# Patient Record
Sex: FEMALE | Race: BLACK OR AFRICAN AMERICAN | NOT HISPANIC OR LATINO | Employment: OTHER | URBAN - METROPOLITAN AREA
[De-identification: names, ages, dates, MRNs, and addresses within clinical notes are randomized per-mention and may not be internally consistent; named-entity substitution may affect disease eponyms.]

---

## 2017-05-21 ENCOUNTER — HOSPITAL ENCOUNTER (INPATIENT)
Facility: HOSPITAL | Age: 72
LOS: 1 days | Discharge: HOME | DRG: 312 | End: 2017-05-22
Attending: EMERGENCY MEDICINE | Admitting: INTERNAL MEDICINE
Payer: MEDICARE

## 2017-05-21 DIAGNOSIS — R60.0 BILATERAL EDEMA OF LOWER EXTREMITY: ICD-10-CM

## 2017-05-21 DIAGNOSIS — R55 SYNCOPE, UNSPECIFIED SYNCOPE TYPE: Primary | ICD-10-CM

## 2017-05-21 DIAGNOSIS — R55 SYNCOPE: ICD-10-CM

## 2017-05-21 LAB
ALBUMIN SERPL BCP-MCNC: 3.5 G/DL
ALP SERPL-CCNC: 144 U/L
ALT SERPL W/O P-5'-P-CCNC: 18 U/L
ANION GAP SERPL CALC-SCNC: 16 MMOL/L
AST SERPL-CCNC: 30 U/L
BACTERIA #/AREA URNS HPF: ABNORMAL /HPF
BASOPHILS # BLD AUTO: 0.01 K/UL
BASOPHILS NFR BLD: 0.1 %
BILIRUB SERPL-MCNC: 0.6 MG/DL
BILIRUB UR QL STRIP: NEGATIVE
BNP SERPL-MCNC: 14 PG/ML
BUN SERPL-MCNC: 12 MG/DL
CALCIUM SERPL-MCNC: 9.3 MG/DL
CHLORIDE SERPL-SCNC: 104 MMOL/L
CK SERPL-CCNC: 437 U/L
CLARITY UR: CLEAR
CO2 SERPL-SCNC: 20 MMOL/L
COLOR UR: ABNORMAL
CREAT SERPL-MCNC: 1.2 MG/DL
DIFFERENTIAL METHOD: ABNORMAL
EOSINOPHIL # BLD AUTO: 0.1 K/UL
EOSINOPHIL NFR BLD: 0.4 %
ERYTHROCYTE [DISTWIDTH] IN BLOOD BY AUTOMATED COUNT: 13.8 %
EST. GFR  (AFRICAN AMERICAN): 53 ML/MIN/1.73 M^2
EST. GFR  (NON AFRICAN AMERICAN): 46 ML/MIN/1.73 M^2
GLUCOSE SERPL-MCNC: 294 MG/DL
GLUCOSE UR QL STRIP: ABNORMAL
HCT VFR BLD AUTO: 42 %
HGB BLD-MCNC: 14.3 G/DL
HGB UR QL STRIP: ABNORMAL
KETONES UR QL STRIP: NEGATIVE
LEUKOCYTE ESTERASE UR QL STRIP: ABNORMAL
LYMPHOCYTES # BLD AUTO: 5.2 K/UL
LYMPHOCYTES NFR BLD: 43.2 %
MAGNESIUM SERPL-MCNC: 2.1 MG/DL
MCH RBC QN AUTO: 30.6 PG
MCHC RBC AUTO-ENTMCNC: 34 %
MCV RBC AUTO: 90 FL
MICROSCOPIC COMMENT: ABNORMAL
MONOCYTES # BLD AUTO: 0.5 K/UL
MONOCYTES NFR BLD: 4.3 %
NEUTROPHILS # BLD AUTO: 6.1 K/UL
NEUTROPHILS NFR BLD: 52 %
NITRITE UR QL STRIP: NEGATIVE
PH UR STRIP: 6 [PH] (ref 5–8)
PLATELET # BLD AUTO: 298 K/UL
PLATELET BLD QL SMEAR: ABNORMAL
PMV BLD AUTO: 9.6 FL
POCT GLUCOSE: 302 MG/DL (ref 70–110)
POCT GLUCOSE: 385 MG/DL (ref 70–110)
POTASSIUM SERPL-SCNC: 3.7 MMOL/L
PROT SERPL-MCNC: 7.1 G/DL
PROT UR QL STRIP: NEGATIVE
RBC # BLD AUTO: 4.68 M/UL
RBC #/AREA URNS HPF: 1 /HPF (ref 0–4)
SODIUM SERPL-SCNC: 140 MMOL/L
SP GR UR STRIP: 1.01 (ref 1–1.03)
TROPONIN I SERPL DL<=0.01 NG/ML-MCNC: 0.01 NG/ML
TROPONIN I SERPL DL<=0.01 NG/ML-MCNC: <0.006 NG/ML
URN SPEC COLLECT METH UR: ABNORMAL
UROBILINOGEN UR STRIP-ACNC: NEGATIVE EU/DL
WBC # BLD AUTO: 11.93 K/UL
WBC #/AREA URNS HPF: 5 /HPF (ref 0–5)
YEAST URNS QL MICRO: ABNORMAL

## 2017-05-21 PROCEDURE — 85007 BL SMEAR W/DIFF WBC COUNT: CPT

## 2017-05-21 PROCEDURE — 83735 ASSAY OF MAGNESIUM: CPT

## 2017-05-21 PROCEDURE — G0378 HOSPITAL OBSERVATION PER HR: HCPCS

## 2017-05-21 PROCEDURE — 85027 COMPLETE CBC AUTOMATED: CPT

## 2017-05-21 PROCEDURE — 93010 ELECTROCARDIOGRAM REPORT: CPT | Mod: 76,,, | Performed by: INTERNAL MEDICINE

## 2017-05-21 PROCEDURE — 25000003 PHARM REV CODE 250: Performed by: HOSPITALIST

## 2017-05-21 PROCEDURE — 83880 ASSAY OF NATRIURETIC PEPTIDE: CPT

## 2017-05-21 PROCEDURE — 93010 ELECTROCARDIOGRAM REPORT: CPT | Mod: ,,, | Performed by: INTERNAL MEDICINE

## 2017-05-21 PROCEDURE — 83036 HEMOGLOBIN GLYCOSYLATED A1C: CPT

## 2017-05-21 PROCEDURE — 82550 ASSAY OF CK (CPK): CPT

## 2017-05-21 PROCEDURE — 80053 COMPREHEN METABOLIC PANEL: CPT

## 2017-05-21 PROCEDURE — 63600175 PHARM REV CODE 636 W HCPCS: Performed by: HOSPITALIST

## 2017-05-21 PROCEDURE — 94761 N-INVAS EAR/PLS OXIMETRY MLT: CPT

## 2017-05-21 PROCEDURE — 84484 ASSAY OF TROPONIN QUANT: CPT

## 2017-05-21 PROCEDURE — 84484 ASSAY OF TROPONIN QUANT: CPT | Mod: 91

## 2017-05-21 PROCEDURE — 93005 ELECTROCARDIOGRAM TRACING: CPT

## 2017-05-21 PROCEDURE — 36415 COLL VENOUS BLD VENIPUNCTURE: CPT

## 2017-05-21 PROCEDURE — 99285 EMERGENCY DEPT VISIT HI MDM: CPT

## 2017-05-21 PROCEDURE — 87086 URINE CULTURE/COLONY COUNT: CPT

## 2017-05-21 PROCEDURE — 81000 URINALYSIS NONAUTO W/SCOPE: CPT

## 2017-05-21 RX ORDER — IBUPROFEN 200 MG
16 TABLET ORAL
Status: DISCONTINUED | OUTPATIENT
Start: 2017-05-21 | End: 2017-05-22 | Stop reason: HOSPADM

## 2017-05-21 RX ORDER — INSULIN ASPART 100 [IU]/ML
0-5 INJECTION, SOLUTION INTRAVENOUS; SUBCUTANEOUS
Status: DISCONTINUED | OUTPATIENT
Start: 2017-05-21 | End: 2017-05-22 | Stop reason: SDUPTHER

## 2017-05-21 RX ORDER — CEPHALEXIN 500 MG/1
500 CAPSULE ORAL EVERY 6 HOURS
Status: DISCONTINUED | OUTPATIENT
Start: 2017-05-21 | End: 2017-05-22 | Stop reason: HOSPADM

## 2017-05-21 RX ORDER — GLUCAGON 1 MG
1 KIT INJECTION
Status: DISCONTINUED | OUTPATIENT
Start: 2017-05-21 | End: 2017-05-22 | Stop reason: SDUPTHER

## 2017-05-21 RX ORDER — ENOXAPARIN SODIUM 100 MG/ML
40 INJECTION SUBCUTANEOUS EVERY 24 HOURS
Status: DISCONTINUED | OUTPATIENT
Start: 2017-05-21 | End: 2017-05-22 | Stop reason: HOSPADM

## 2017-05-21 RX ORDER — IBUPROFEN 200 MG
24 TABLET ORAL
Status: DISCONTINUED | OUTPATIENT
Start: 2017-05-21 | End: 2017-05-22 | Stop reason: HOSPADM

## 2017-05-21 RX ADMIN — INSULIN ASPART 3 UNITS: 100 INJECTION, SOLUTION INTRAVENOUS; SUBCUTANEOUS at 10:05

## 2017-05-21 RX ADMIN — SODIUM CHLORIDE 1000 ML: 0.9 INJECTION, SOLUTION INTRAVENOUS at 10:05

## 2017-05-21 RX ADMIN — CEPHALEXIN 500 MG: 500 CAPSULE ORAL at 05:05

## 2017-05-21 RX ADMIN — ENOXAPARIN SODIUM 40 MG: 100 INJECTION SUBCUTANEOUS at 05:05

## 2017-05-21 RX ADMIN — INSULIN ASPART 3 UNITS: 100 INJECTION, SOLUTION INTRAVENOUS; SUBCUTANEOUS at 06:05

## 2017-05-21 NOTE — ED NOTES
APPEARANCE: Alert, oriented and in no acute distress.  CARDIAC: Normal rate and rhythm, no murmur heard.   PERIPHERAL VASCULAR: peripheral pulses present. Normal cap refill. 1+ bilateral lower extremity edema. cool to touch.    RESPIRATORY:Normal rate and effort, breath sounds clear bilaterally throughout chest. Respirations are equal and unlabored no obvious signs of distress.  GASTRO: soft, bowel sounds normal, no tenderness, no abdominal distention.  MUSC: Full ROM. No bony tenderness or soft tissue tenderness. No obvious deformity.  SKIN: Skin is cool and dry, slow to return skin turgor, mucous membranes moist.  NEURO: 5/5 strength major flexors/extensors bilaterally. Sensory intact to light touch bilaterally. [place] coma scale: eyes open spontaneously-4, oriented & converses-5, obeys commands-6. No neurological abnormalities.   MENTAL STATUS: awake, alert and aware of environment.  EYE: PERRL, both eyes: pupils brisk and reactive to light. Normal size.  ENT: EARS: no obvious drainage. NOSE: no active bleeding.

## 2017-05-21 NOTE — NURSING
Patient unable to give name of medication she takes for excess fluid. Explained that MD will need to know which medication it is that she takes at home. Patient voices understanding.

## 2017-05-21 NOTE — ED TRIAGE NOTES
Pt presents to ED today via EMS from urgent care clinic. She received IM injection of decadron and had a syncopal episode   Pt now AAO

## 2017-05-21 NOTE — ED PROVIDER NOTES
Encounter Date: 2017       History     Chief Complaint   Patient presents with    Loss of Consciousness     had shot of decadron at acute care clinic in [facility], had a syncopal episode and loss of bowels, arrives awake      Review of patient's allergies indicates:  No Known Allergies  Patient is a 71-year-old female who had a syncopal episode.  Patient says she went to an urgent care clinic for treatment of a rash to the right side of her neck.  She says she was given a shot at the clinic which he believes may have been a steroid.  After leaving the office she immediately began to feel lightheaded.  She will return to the clinic and as she was being helped to the restroom she passed out.  She had no chest pain or shortness of breath prior to the event.  She has no prior history of syncope.  She complains of feeling tremulous at present.      The history is provided by the patient (and family).     Past Medical History:   Diagnosis Date    Diabetes mellitus      Past Surgical History:   Procedure Laterality Date     SECTION      CHOLECYSTECTOMY       No family history on file.  Social History   Substance Use Topics    Smoking status: Former Smoker     Quit date: 6/10/2014    Smokeless tobacco: Not on file    Alcohol use No     Review of Systems   Constitutional: Negative for fever.   Respiratory: Negative for shortness of breath.    Cardiovascular: Negative for chest pain.   Gastrointestinal: Negative for abdominal pain, nausea and vomiting.   Skin: Positive for rash.   Neurological: Positive for syncope.   All other systems reviewed and are negative.      Physical Exam     Initial Vitals   BP Pulse Resp Temp SpO2   17 1236 17 1236 17 1236 17 1238 17 1236   (!) 146/100 102 20 97.9 °F (36.6 °C) 100 %     Physical Exam    Nursing note and vitals reviewed.  Constitutional: No distress.   HENT:   Head: Normocephalic and atraumatic.   Eyes: EOM are normal.   Neck: Normal  range of motion. Neck supple.   Cardiovascular: Normal rate, regular rhythm and normal heart sounds.   Pulmonary/Chest: Breath sounds normal.   Abdominal: Soft. There is no tenderness.   Musculoskeletal: Normal range of motion.   Neurological: She is alert and oriented to person, place, and time.   Skin: Skin is warm and dry.   Psychiatric: Her behavior is normal. Thought content normal.         ED Course   Procedures  Labs Reviewed   CBC W/ AUTO DIFFERENTIAL - Abnormal; Notable for the following:        Result Value    Lymph # 5.2 (*)     All other components within normal limits   COMPREHENSIVE METABOLIC PANEL - Abnormal; Notable for the following:     CO2 20 (*)     Glucose 294 (*)     Alkaline Phosphatase 144 (*)     eGFR if  53 (*)     eGFR if non  46 (*)     All other components within normal limits   CK - Abnormal; Notable for the following:      (*)     All other components within normal limits   TROPONIN I   MAGNESIUM   URINALYSIS     Imaging Results          X-Ray Chest 1 View (Final result)  Result time 05/21/17 13:14:41    Final result by Angelo Mclean MD (05/21/17 13:14:41)                 Impression:       No acute intrathoracic process.          Electronically signed by: ANGELO MCLEAN MD  Date:     05/21/17  Time:    13:14              Narrative:    [id number]  Accession # [id number]      Study:  XR CHEST 1 VIEW    Indication: syncope.    Comparison: Chest radiograph from 09/12/2016.    Findings:     XR CHEST 1 VIEW.    Cardiac silhouette is not enlarged.  Calcification of the aortic arch.  Pulmonary vasculature is within normal limits.    Lungs well-aerated.  No focal consolidation.   No pleural effusion.  Osseous structures demonstrate no significant abnormalities.                              EKG Readings: (Independently Interpreted)   Rhythm: Sinus Tachycardia. Heart Rate: 103. ST Segments: Normal ST Segments. Axis: Right Axis Deviation.                             ED Course     Clinical Impression:   The primary encounter diagnosis was Syncope, unspecified syncope type. A diagnosis of Syncope was also pertinent to this visit.          Jareth Tomlinson MD  05/21/17 2241

## 2017-05-21 NOTE — H&P
[facility] Internal Medicine History and Physical - Resident Note    Admitting Team: U IM B  Attending Physician: Kassi  Resident: Reyna  Interns: Lila    Date of Admit: 2017    Chief Complaint     Loss of Consciousness (had shot of decadron at acute care clinic in [facility], had a syncopal episode and loss of bowels, arrives awake )   for x1 day    Subjective:      History of Present Illness:  Aretha Holt is a 71 y.o. female with DM2 who presented to [facility] on 2017 with LOC x1 day.    The patient was in their usual state of health (lives at home with grandchildren, no limitations) until 1 week ago when she began to have a rash on her legs that was red and painful.  It migrated from one leg to the other and eventually went away. However, a new rash appeared on her neck and she went to the urgent care.  At the urgent care she was given a steroid shot in her left hip (she is unsure which steroid).  She felt a little shaky at the time and as she went to the car she felt lightheaded.  She felt so bad that she turned around and went back to the urgent care.  They were monitoring her at the urgent care, and she began to have the urge to use the bathroom.  They were walking her to the bathroom and at that point she lost consciousness.  She doesn't know how long she was out but she lost bowel continence.  When she came through they called the ambulance and took her here for evaluation.    She states he symptoms before passing out were associated with shortness of breath and chest pain.  She denies prior similar symptoms.  She endorses some chills over the past few days.    Past Medical History:  Past Medical History:   Diagnosis Date    Diabetes mellitus      Past Surgical History:  Past Surgical History:   Procedure Laterality Date     SECTION      CHOLECYSTECTOMY       Allergies:  Review of patient's allergies indicates:  No Known Allergies    Home  "Medications:  Prior to Admission medications    Medication Sig Start Date End Date Taking? Authorizing Provider   acetaminophen (TYLENOL) 500 MG tablet Take 2 tablets (1,000 mg total) by mouth 3 (three) times daily as needed for Pain. 16   Malvin Davenport MD   ergocalciferol (VITAMIN D2) 50,000 unit Cap Take 50,000 Units by mouth every 7 days.    Historical Provider, MD   glimepiride (AMARYL) 1 MG tablet Take 1 mg by mouth before breakfast.    Historical Provider, MD   ibuprofen (ADVIL,MOTRIN) 200 MG tablet Take 2 tablets (400 mg total) by mouth every 6 (six) hours as needed for Pain. 16   Malvin Davenport MD       Family History:  No family history on file.    Social History:  Social History   Substance Use Topics    Smoking status: Former Smoker     Quit date: 6/10/2014    Smokeless tobacco: Not on file    Alcohol use No       Review of Systems:  Pertinent items are noted in HPI. All other systems are reviewed and are negative.    Health Maintaince :   Primary Care Physician: Nikole Sandra  Immunizations:   TDap is up to date.  Influenza is up to date.  Pneumovax is unsure.  Cancer Screening:  PAP: is not up to date.  Mammogram: is not up to date.  Colonoscopy: is not sure?     Objective:   Last 24 Hour Vital Signs:  BP  Min: 84/43  Max: 146/100  Temp  Av.9 °F (36.6 °C)  Min: 97.9 °F (36.6 °C)  Max: 97.9 °F (36.6 °C)  Pulse  Av  Min: 96  Max: 110  Resp  Av  Min: 20  Max: 20  SpO2  Av %  Min: 100 %  Max: 100 %  Height  Av' 3" (160 cm)  Min: 5' 3" (160 cm)  Max: 5' 3" (160 cm)  Weight  Av.8 kg (187 lb)  Min: 84.8 kg (187 lb)  Max: 84.8 kg (187 lb)  Body mass index is 33.13 kg/m².  No intake/output data recorded.    Physical Examination:  Gen: NAD, laying in bed comfortably  Head: Normocephalic, atraumatic  Eyes: EOMI, PERRLA  Mouth: MMM, tongue protrusion midline, no swelling  Neck: no lymphadenopathy, warm erythematous area on the right aspect with some raised " areas.  CV: RRR, no murmurs,rubs, or gallops  Pulm: CTAB, no wheezes or crackles  Abd: nontender, nondistended, normoactive BS  Extremities: 2+ pulses in all extremities, bilateral lower extremity edema with sharp tenderness to palpation bilaterally. No erythema  Skin: nuch rash as described above, no other rashes or lesions  Neuro: moving all extremities well  Psych: AAOx4, calm, cooperative, conversational    Laboratory:  Most Recent Data:  CBC: Lab Results   Component Value Date    WBC 11.93 05/21/2017    HGB 14.3 05/21/2017    HCT 42.0 05/21/2017     05/21/2017    MCV 90 05/21/2017    RDW 13.8 05/21/2017     Lab Results   Component Value Date    GRAN 6.1 05/21/2017    GRAN 52.0 05/21/2017    LYMPH 5.2 (H) 05/21/2017    LYMPH 43.2 05/21/2017    MONO 0.5 05/21/2017    MONO 4.3 05/21/2017    EOS 0.1 05/21/2017    BASO 0.01 05/21/2017    EOSINOPHIL 0.4 05/21/2017    BASOPHIL 0.1 05/21/2017       BMP: Lab Results   Component Value Date     05/21/2017    K 3.7 05/21/2017     05/21/2017    CO2 20 (L) 05/21/2017    BUN 12 05/21/2017    CREATININE 1.2 05/21/2017     (H) 05/21/2017    CALCIUM 9.3 05/21/2017    MG 2.1 05/21/2017     LFTs: Lab Results   Component Value Date    PROT 7.1 05/21/2017    ALBUMIN 3.5 05/21/2017    BILITOT 0.6 05/21/2017    AST 30 05/21/2017    ALKPHOS 144 (H) 05/21/2017    ALT 18 05/21/2017     Coags:   Lab Results   Component Value Date    INR 1.1 09/12/2016     FLP: No results found for: CHOL, HDL, LDLCALC, TRIG, CHOLHDL  DM: Lab Results   Component Value Date    CREATININE 1.2 05/21/2017     Thyroid: Lab Results   Component Value Date    TSH 1.020 03/11/2016     Anemia: No results found for: IRON, TIBC, FERRITIN, NVLNPSTG99, FOLATE  Cardiac: Lab Results   Component Value Date    TROPONINI <0.006 05/21/2017     Urinalysis: Lab Results   Component Value Date    COLORU Yellow 03/11/2016    SPECGRAV 1.010 03/11/2016    NITRITE Negative 03/11/2016    KETONESU Negative  03/11/2016    UROBILINOGEN Negative 03/11/2016    WBCUA 2 03/11/2016       Trended Lab Data:    Recent Labs  Lab 05/21/17  1305   WBC 11.93   HGB 14.3   HCT 42.0      MCV 90   RDW 13.8      K 3.7      CO2 20*   BUN 12   CREATININE 1.2   *   PROT 7.1   ALBUMIN 3.5   BILITOT 0.6   AST 30   ALKPHOS 144*   ALT 18     Trended Cardiac Data:    Recent Labs  Lab 05/21/17  1305   TROPONINI <0.006     Microbiology Data:    Other Laboratory Data:    Other Results:  EKG (my interpretation): sinus tach, borderline R axis deviation, T wave inversions in V1    Radiology:  Imaging Results          X-Ray Chest 1 View (Final result)  Result time 05/21/17 13:14:41    Final result by Angelo Mclean MD (05/21/17 13:14:41)                 Impression:       No acute intrathoracic process.          Electronically signed by: ANGELO MCLEAN MD  Date:     05/21/17  Time:    13:14              Narrative:    [id number]  Accession # [id number]      Study:  XR CHEST 1 VIEW    Indication: syncope.    Comparison: Chest radiograph from 09/12/2016.    Findings:     XR CHEST 1 VIEW.    Cardiac silhouette is not enlarged.  Calcification of the aortic arch.  Pulmonary vasculature is within normal limits.    Lungs well-aerated.  No focal consolidation.   No pleural effusion.  Osseous structures demonstrate no significant abnormalities.                             Assessment:     Aretha Holt is a 71 y.o. female with:  There are no active problems to display for this patient.       Plan:     Syncope  Concerning for vaso-vagal, orthostatic hypotension, anaphylaxis, less likely cardiogenic e.g. arrhythmia, ACS, PE though low suspicion. Given ASA by EMS. EKG w/ NSR, borderline right axis, but no acute ischemic changes. CXR unremarkable.   - Monitor on tele  - Tryptase pending  - Tn 0.006 >> trend  - Trend EKGs -> repeat axis wnl. Suspect EKG placement vs other cause of R-axis deviancy  - Echo in AM     Erythematous rash of R  neck  Right lateral neck erythema. No swelling, induration, fluctuance. Neck supple, no meningeal signs. Small concern for cellulitis  - PO Keflex     LE edema  Chronic, symmetric, mild diffuse tenderness.   - Low suspicion for DVT/PE but in setting of syncope will get LE venous doppler      Exertional angina  Ongoing for weeks to months. Unrelated to today's episode, no pain recently.  - Tn/EKGs as above  - Consider stress vs angio. May be candidate for outpatient workup.     DM  - Check A1c, hold home glimeperide  - SSI     Diet: Cardiac, NPO @ MN  Fluids: n/a  PPX: LMWH  Code status: Full  Dispo: admit to tele    Berhane Sharp MD HO-1  [facility] Internal Medicine TEAM B    [facility] Medicine Hospitalist Pager numbers:   [facility] Hospitalist Medicine Team A (Corin/Ghanshyam): [phone]  [facility] Hospitalist Medicine Team B (Ted/Charles):  [phone]

## 2017-05-21 NOTE — PLAN OF CARE
Problem: Patient Care Overview  Goal: Plan of Care Review  Outcome: Ongoing (interventions implemented as appropriate)  Pt. On room air in no apparent distress. Will cont. To monitor.

## 2017-05-22 VITALS
BODY MASS INDEX: 33.21 KG/M2 | WEIGHT: 187.44 LBS | TEMPERATURE: 98 F | DIASTOLIC BLOOD PRESSURE: 58 MMHG | HEIGHT: 63 IN | OXYGEN SATURATION: 95 % | RESPIRATION RATE: 18 BRPM | HEART RATE: 79 BPM | SYSTOLIC BLOOD PRESSURE: 138 MMHG

## 2017-05-22 LAB
ALBUMIN SERPL BCP-MCNC: 3.2 G/DL
ALP SERPL-CCNC: 136 U/L
ALT SERPL W/O P-5'-P-CCNC: 17 U/L
ANION GAP SERPL CALC-SCNC: 15 MMOL/L
AST SERPL-CCNC: 20 U/L
BASOPHILS # BLD AUTO: 0 K/UL
BASOPHILS NFR BLD: 0 %
BILIRUB SERPL-MCNC: 0.3 MG/DL
BUN SERPL-MCNC: 16 MG/DL
CALCIUM SERPL-MCNC: 8.7 MG/DL
CHLORIDE SERPL-SCNC: 104 MMOL/L
CHOLEST/HDLC SERPL: 3.5 {RATIO}
CO2 SERPL-SCNC: 20 MMOL/L
CREAT SERPL-MCNC: 1.1 MG/DL
DIASTOLIC DYSFUNCTION: NO
DIFFERENTIAL METHOD: ABNORMAL
EOSINOPHIL # BLD AUTO: 0 K/UL
EOSINOPHIL NFR BLD: 0 %
ERYTHROCYTE [DISTWIDTH] IN BLOOD BY AUTOMATED COUNT: 14 %
EST. GFR  (AFRICAN AMERICAN): 58 ML/MIN/1.73 M^2
EST. GFR  (NON AFRICAN AMERICAN): 51 ML/MIN/1.73 M^2
ESTIMATED AVG GLUCOSE: 212 MG/DL
ESTIMATED PA SYSTOLIC PRESSURE: 21.15
GLUCOSE SERPL-MCNC: 367 MG/DL
HBA1C MFR BLD HPLC: 9 %
HCT VFR BLD AUTO: 37.9 %
HDL/CHOLESTEROL RATIO: 28.4 %
HDLC SERPL-MCNC: 162 MG/DL
HDLC SERPL-MCNC: 46 MG/DL
HGB BLD-MCNC: 12.5 G/DL
LDLC SERPL CALC-MCNC: 93.4 MG/DL
LYMPHOCYTES # BLD AUTO: 1.2 K/UL
LYMPHOCYTES NFR BLD: 10.4 %
MCH RBC QN AUTO: 30 PG
MCHC RBC AUTO-ENTMCNC: 33 %
MCV RBC AUTO: 91 FL
MITRAL VALVE REGURGITATION: NORMAL
MONOCYTES # BLD AUTO: 0.4 K/UL
MONOCYTES NFR BLD: 3.6 %
NEUTROPHILS # BLD AUTO: 10.1 K/UL
NEUTROPHILS NFR BLD: 85.5 %
NONHDLC SERPL-MCNC: 116 MG/DL
PLATELET # BLD AUTO: 303 K/UL
PMV BLD AUTO: 9.7 FL
POCT GLUCOSE: 177 MG/DL (ref 70–110)
POCT GLUCOSE: 296 MG/DL (ref 70–110)
POTASSIUM SERPL-SCNC: 4.1 MMOL/L
PROT SERPL-MCNC: 6.3 G/DL
RBC # BLD AUTO: 4.17 M/UL
RETIRED EF AND QEF - SEE NOTES: 65 (ref 55–65)
SODIUM SERPL-SCNC: 139 MMOL/L
TRICUSPID VALVE REGURGITATION: NORMAL
TRIGL SERPL-MCNC: 113 MG/DL
TROPONIN I SERPL DL<=0.01 NG/ML-MCNC: <0.006 NG/ML
WBC # BLD AUTO: 11.79 K/UL

## 2017-05-22 PROCEDURE — 11000001 HC ACUTE MED/SURG PRIVATE ROOM

## 2017-05-22 PROCEDURE — 93351 STRESS TTE COMPLETE: CPT

## 2017-05-22 PROCEDURE — 36415 COLL VENOUS BLD VENIPUNCTURE: CPT

## 2017-05-22 PROCEDURE — 25000003 PHARM REV CODE 250: Performed by: HOSPITALIST

## 2017-05-22 PROCEDURE — 80061 LIPID PANEL: CPT

## 2017-05-22 PROCEDURE — 84484 ASSAY OF TROPONIN QUANT: CPT

## 2017-05-22 PROCEDURE — 80053 COMPREHEN METABOLIC PANEL: CPT

## 2017-05-22 PROCEDURE — 85025 COMPLETE CBC W/AUTO DIFF WBC: CPT

## 2017-05-22 PROCEDURE — 94761 N-INVAS EAR/PLS OXIMETRY MLT: CPT

## 2017-05-22 PROCEDURE — 93320 DOPPLER ECHO COMPLETE: CPT

## 2017-05-22 PROCEDURE — 63600175 PHARM REV CODE 636 W HCPCS: Performed by: HOSPITALIST

## 2017-05-22 RX ORDER — GLIMEPIRIDE 1 MG/1
1 TABLET ORAL
Qty: 30 TABLET | Refills: 3 | OUTPATIENT
Start: 2017-05-22 | End: 2020-02-05

## 2017-05-22 RX ORDER — INSULIN ASPART 100 [IU]/ML
1-10 INJECTION, SOLUTION INTRAVENOUS; SUBCUTANEOUS EVERY 6 HOURS PRN
Status: DISCONTINUED | OUTPATIENT
Start: 2017-05-22 | End: 2017-05-22 | Stop reason: HOSPADM

## 2017-05-22 RX ORDER — GLUCAGON 1 MG
1 KIT INJECTION
Status: DISCONTINUED | OUTPATIENT
Start: 2017-05-22 | End: 2017-05-22 | Stop reason: HOSPADM

## 2017-05-22 RX ORDER — CEPHALEXIN 500 MG/1
500 CAPSULE ORAL EVERY 12 HOURS
Qty: 7 CAPSULE | Refills: 0 | OUTPATIENT
Start: 2017-05-22 | End: 2020-02-05

## 2017-05-22 RX ADMIN — CEPHALEXIN 500 MG: 500 CAPSULE ORAL at 12:05

## 2017-05-22 RX ADMIN — CEPHALEXIN 500 MG: 500 CAPSULE ORAL at 05:05

## 2017-05-22 RX ADMIN — CEPHALEXIN 500 MG: 500 CAPSULE ORAL at 11:05

## 2017-05-22 RX ADMIN — INSULIN ASPART 6 UNITS: 100 INJECTION, SOLUTION INTRAVENOUS; SUBCUTANEOUS at 08:05

## 2017-05-22 RX ADMIN — INSULIN DETEMIR 10 UNITS: 100 INJECTION, SOLUTION SUBCUTANEOUS at 08:05

## 2017-05-22 NOTE — PLAN OF CARE
Patient discharge instructions given and reviewed. Med rec reviewed with Patient. Patient verbalized understanding. Education provided on new medication and diagnosis and follow-up appointment. PIV d/lucie tip intact. Pt tolerated well.  Tele removed.  Awaiting transportation home.

## 2017-05-22 NOTE — PLAN OF CARE
05/22/17 1649   Final Note   Assessment Type Final Discharge Note   Discharge Disposition Home   Discharge planning education complete? Yes   Hospital Follow Up  Appt(s) scheduled? Yes   Discharge plans and expectations educations in teach back method with documentation complete? Yes   Offered [facility] -- Bedside Delivery? n/a   Discharge/Hospital Encounter Summary to (non-[facility]) PCP No   Referral to Outpatient Case Management complete? No   Referral to / orders for Home Health Complete? No   30 day supply of medicines given at discharge, if documented non-compliance / non-adherence? No   Any social issues identified prior to discharge? No   Did you assess the readiness or willingness of the family or caregiver to support self management of care? No   Right Care Referral Info   Post Acute Recommendation No Care     Frances Scherer, RN, CCM, CMSRN  RN Transition Navigator  [phone]

## 2017-05-22 NOTE — PROGRESS NOTES
1220 pt ready for test. Allergies confirmed, pt denies hx of glaucoma.  R Hand PIV in place flushing smoothly.   1220 Isac [address]OUR[address] Dr at bedside for consent and explain procedure. Pt verbalized full understanding and denies questions.   1231 Test started per protocol, see EKG sheet for vs.  1231 Dobutamine at 10mcg/kg/min to increase hr, target HR 127bpm.  1234 DObutamine at 20 mcg, pt tolerating well.   1237 Dobutamine at 30 mcg, leg exercises initiated. Target HR achieved. Tech taking pics at this time, pt tolerating well ,denies CP NV or any other discomfort.  Testing phase completed, dobutamine off, NS up at rapid rate for recovery phase.   1253 Recovery phase coompleted. Pt states feels normal, no distress. NS dc'd, 225 approx. Pt released to cardio.

## 2017-05-22 NOTE — PLAN OF CARE
Pt voices no discharge needs at this time. TN Frances Scherer to follow.     05/22/17 1402   Discharge Assessment   Assessment Type Discharge Planning Assessment   Confirmed/corrected address and phone number on facesheet? Yes   Assessment information obtained from? Patient   Expected Length of Stay (days) 1   Communicated expected length of stay with patient/caregiver yes   Prior to hospitilization cognitive status: Alert/Oriented   Prior to hospitalization functional status: Independent   Current cognitive status: Alert/Oriented   Current Functional Status: Independent   Arrived From home or self-care   Lives With grandchild(luis eduardo)   Able to Return to Prior Arrangements yes   Is patient able to care for self after discharge? Yes   How many people do you have in your home that can help with your care after discharge? 1   Who are your caregiver(s) and their phone number(s)? Bushra Trejo(daughter) [phone]   Patient's perception of discharge disposition home or selfcare   Readmission Within The Last 30 Days no previous admission in last 30 days   Patient currently being followed by outpatient case management? No   Patient currently receives home health services? No   Does the patient currently use HME? Yes   Patient currently receives private duty nursing? No   Patient currently receives any other outside agency services? No   Equipment Currently Used at Home glucometer   Do you have any problems affording any of your prescribed medications? No   Is the patient taking medications as prescribed? yes   Do you have any financial concerns preventing you from receiving the healthcare you need? No   Does the patient have transportation to healthcare appointments? Yes   Transportation Available family or friend will provide;car   On Dialysis? No   Does the patient receive services at the Coumadin Clinic? No   Are there any open cases? No   Discharge Plan A Home with family   Discharge Plan B Home with family;Home Health    Patient/Family In Agreement With Plan yes

## 2017-05-22 NOTE — PHYSICIAN QUERY
PT Name: Aretha Holt  MR #: [id number]     Physician Query Form - Documentation Clarification      CDS/: Zeina Newell               Contact information:[email or web address]    This form is a permanent document in the medical record.     Query Date: May 22, 2017    By submitting this query, we are merely seeking further clarification of documentation. Please utilize your independent clinical judgment when addressing the question(s) below.    The Medical record reflects the following:    Supporting Clinical Findings Location in Medical Record     DM2, uncontrolled        PN 5/22     A1C 9.0, will likely need insulin on discharge     Glucose = 294---->367   PN 5/22      Labs 5/21-5/22                                                                            Doctor, Please specify diagnosis or diagnoses associated with above clinical findings.    Provider Use Only      [X]  DM2 with hyperglycemia    [   ]  DM2 with hypoglycemia    [   ]  Other explanations with details______________________________________                                                                                                                 [  ] Clinically undetermined

## 2017-05-22 NOTE — PROGRESS NOTES
"[facility] IM Resident HO-I Progress Note    Subjective:      Aretha Holt is a 71 y.o. female who is being followed by the [facility] IM service at [facility] for syncopal episode.    This morning, patient denies any episodes of chest pain or shortness of breath overnight. Denies lightheadedness or syncope.     Objective:   Last 24 Hour Vital Signs:  BP  Min: 84/43  Max: 146/72  Temp  Av °F (36.7 °C)  Min: 97.8 °F (36.6 °C)  Max: 98.2 °F (36.8 °C)  Pulse  Av.1  Min: 75  Max: 110  Resp  Av  Min: 20  Max: 20  SpO2  Av.8 %  Min: 97 %  Max: 100 %  Height  Av' 3" (160 cm)  Min: 5' 3" (160 cm)  Max: 5' 3" (160 cm)  Weight  Av.9 kg (187 lb 3.5 oz)  Min: 84.8 kg (187 lb)  Max: 85 kg (187 lb 7 oz)  I/O last 3 completed shifts:  In: 1000 [I.V.:1000]  Out: 1100 [Urine:1100]    Physical Examination:  Vitals:    17 0035   BP: (!) 129/55   Pulse: 81   Resp: 20   Temp: 98.1 °F (36.7 °C)     Gen: NAD, laying in bed comfortably  HENT: NCAT, EOMI, MMM  CV: RRR, no murmurs,rubs, or gallops  Pulm: CTAB, no wheezes or crackles  Abd: nontender, nondistended, normoactive BS  Extremities: 2+ pulses in all extremities, bilateral lower extremity edema with sharp tenderness to palpation bilaterally. No erythema  Skin: nuch rash as described above, no other rashes or lesions  Neuro: moving all extremities well  Psych: AAOx4, calm, cooperative, conversational      Laboratory:  Laboratory Data Reviewed: yes  Pertinent Findings:  0.006 -> 0.008 -> 0.006    Microbiology Data Reviewed: yes  Pertinent Findings:  Urine cx pending    Other Results:  EKG (my interpretation): R axis deviation resolved, NSR, nonspecific T wave inversions in V1    Radiology Data Reviewed: yes  Pertinent Findings:  None new.    Current Medications:     Infusions:        Scheduled:   cephALEXin  500 mg Oral Q6H    enoxaparin  40 mg Subcutaneous Daily        PRN:  dextrose 50%, dextrose 50%, glucagon (human recombinant), " glucose, glucose, insulin aspart    Antibiotics and Day Number of Therapy:  Cephalexin 5/21-current    Lines and Day Number of Therapy:  PIV    Assessment:     Aretha Holt is a 71 y.o.female with  Patient Active Problem List    Diagnosis Date Noted    Syncope 05/21/2017    Bilateral edema of lower extremity 05/21/2017        Plan:     Syncope  Concerning for vaso-vagal, orthostatic hypotension, anaphylaxis, less likely cardiogenic e.g. arrhythmia, ACS, PE though low suspicion. Given ASA by EMS. EKG w/ NSR, borderline right axis, but no acute ischemic changes. CXR unremarkable.   - Monitor on tele  - Tryptase pending  - Tn 0.006 > 0.008 > 0.006  - Trend EKGs -> repeat axis wnl. Suspect EKG placement vs other cause of R-axis deviancy  - Pending echo and stress test this AM     Erythematous rash of R neck  Right lateral neck erythema. No swelling, induration, fluctuance. Neck supple, no meningeal signs. Small concern for cellulitis  - PO Keflex     LE edema  Chronic, symmetric, mild diffuse tenderness.   - Low suspicion for DVT/PE but in setting of syncope will get LE venous doppler  - US LE pending    Exertional angina  Ongoing for weeks to months. Unrelated to today's episode, no pain recently.  - Tn/EKGs as above  - Consider stress vs angio. May be candidate for outpatient workup.     DM  - A1C 9.0, will likely need insulin on discharge  - SSI  - glucose elevated likely 2/2 steroid injection     Diet: Cardiac, NPO @ MN  Fluids: n/a  PPX: LMWH  Code status: Full  Dispo: admit to tele    Berhane Sharp MD HO-1  [facility] Internal Medicine B    [facility] Medicine Hospitalist Pager numbers:   [facility] Hospitalist Medicine Team A (Corin/Ghanshyam): [phone]  [facility] Hospitalist Medicine Team B (Ted/Charles):  [phone]

## 2017-05-22 NOTE — PLAN OF CARE
Problem: Fall Risk (Adult)  Goal: Absence of Falls  Patient will demonstrate the desired outcomes by discharge/transition of care.   Outcome: Ongoing (interventions implemented as appropriate)  Bed alarm on and bed in lowest position. Call bell in reach and daughter at bed side.Q 2 hour rounding maintained.

## 2017-05-23 LAB
BACTERIA UR CULT: NORMAL
BACTERIA UR CULT: NORMAL

## 2017-05-24 NOTE — DISCHARGE SUMMARY
[facility] Discharge Summary    Primary Team: [facility] B  Attending Physician: Charles  Resident: Reyna  Intern: Lila    Date of Admit: 5/21/2017  Date of Discharge: 5/22    Discharge to: home or self-care  Condition: stable    Discharge Diagnoses     Patient Active Problem List   Diagnosis    Syncope    Bilateral edema of lower extremity       Consultants and Procedures     Consultants:  None.    Procedures:   US lower extremity showing no DVT.  DSE negative for ischemia and EF wnl.    Brief History of Present Illness      Aretha Holt is a 72 y.o. female who  has a past medical history of Diabetes mellitus.  The patient presented to [facility] on 5/21/2017 with a primary complaint of Loss of Consciousness (had shot of decadron at acute care clinic in [facility], had a syncopal episode and loss of bowels, arrives awake )     Patient presented with syncopal episode after receiving steroid shot and was admitted for workup.  DVT study was negative, DSE was within normal limits, and patient was stable for discharge the next day.    For the full HPI please refer to the History & Physical from this admission.    Hospital Course By Problem with Pertinent Findings     Syncope  Concerning for vaso-vagal, orthostatic hypotension, anaphylaxis, less likely cardiogenic e.g. arrhythmia, ACS, PE though low suspicion. Given ASA by EMS. EKG w/ NSR, borderline right axis, but no acute ischemic changes. CXR unremarkable.   - Monitor on tele  - Tryptase pending  - Tn 0.006 > 0.008 > 0.006  - Trend EKGs -> repeat axis wnl. Suspect EKG placement vs other cause of R-axis deviancy  - DSE negative for ischemia and EF 65%  - episode likely vasovagal     Erythematous rash of R neck  Right lateral neck erythema. No swelling, induration, fluctuance. Neck supple, no meningeal signs. Small concern for cellulitis  - PO Keflex  - discharged with 5 days total keflex  - likely contact dermatitis, arranged for follow-up with   Tyson     LE edema  Chronic, symmetric, mild diffuse tenderness.   - Low suspicion for DVT/PE but in setting of syncope will get LE venous doppler  - US LE negative for DVT     Exertional angina  Ongoing for weeks to months. Unrelated to today's episode, no pain recently.  - Tn/EKGs as above  - DSE wnl as above     DM  - A1C 9.0  - SSI  - glucose elevated likely 2/2 steroid injection  - discussed with patient and increased glimepiride dose  - may need insulin in the future, defer to PCP    Discharge Medications        Medication List      START taking these medications    cephALEXin 500 MG capsule  Commonly known as:  KEFLEX  Take 1 capsule (500 mg total) by mouth every 12 (twelve) hours.        CONTINUE taking these medications    acetaminophen 500 MG tablet  Commonly known as:  TYLENOL  Take 2 tablets (1,000 mg total) by mouth 3 (three) times daily as needed for Pain.     glimepiride 1 MG tablet  Commonly known as:  AMARYL  Take 1 tablet (1 mg total) by mouth before breakfast.     ibuprofen 200 MG tablet  Commonly known as:  ADVIL,MOTRIN  Take 2 tablets (400 mg total) by mouth every 6 (six) hours as needed for Pain.     VITAMIN D2 50,000 unit Cap  Generic drug:  ergocalciferol           Where to Get Your Medications      You can get these medications from any pharmacy    Bring a paper prescription for each of these medications   cephALEXin 500 MG capsule   glimepiride 1 MG tablet         Discharge Information:   Diet:  diabetic    Physical Activity:  As tolerated    Instructions:  1. Take all medications as prescribed  2. Keep all follow-up appointments  3. Return to the hospital or call your primary care physicians if any worsening symptoms such as fever, chills, loss of consciousness, or any other symptoms.    Follow-Up Appointments:  PCP Dr. Sandra in 1-2 weeks.  Dr. Mehta in 1-2 weeks.    Berhane Sharp MD HO-1  [facility] Internal Medicine TEAM B

## 2017-06-02 ENCOUNTER — HOSPITAL ENCOUNTER (EMERGENCY)
Facility: HOSPITAL | Age: 72
Discharge: HOME | End: 2017-06-02
Attending: EMERGENCY MEDICINE
Payer: MEDICARE

## 2017-06-02 VITALS
HEIGHT: 63 IN | HEART RATE: 86 BPM | SYSTOLIC BLOOD PRESSURE: 138 MMHG | RESPIRATION RATE: 18 BRPM | BODY MASS INDEX: 33.13 KG/M2 | DIASTOLIC BLOOD PRESSURE: 66 MMHG | TEMPERATURE: 98 F | WEIGHT: 187 LBS

## 2017-06-02 DIAGNOSIS — L25.9 CONTACT DERMATITIS, UNSPECIFIED CONTACT DERMATITIS TYPE, UNSPECIFIED TRIGGER: Primary | ICD-10-CM

## 2017-06-02 DIAGNOSIS — B37.31 CANDIDA VAGINITIS: ICD-10-CM

## 2017-06-02 PROCEDURE — 99283 EMERGENCY DEPT VISIT LOW MDM: CPT

## 2017-06-02 PROCEDURE — 25000003 PHARM REV CODE 250: Performed by: PHYSICIAN ASSISTANT

## 2017-06-02 RX ORDER — HYDROCORTISONE 25 MG/G
CREAM TOPICAL 2 TIMES DAILY
Qty: 3.5 G | Refills: 0 | Status: SHIPPED | OUTPATIENT
Start: 2017-06-02 | End: 2022-02-04 | Stop reason: ALTCHOICE

## 2017-06-02 RX ORDER — FLUCONAZOLE 200 MG/1
200 TABLET ORAL
Status: COMPLETED | OUTPATIENT
Start: 2017-06-02 | End: 2017-06-02

## 2017-06-02 RX ORDER — HYDROXYZINE HYDROCHLORIDE 25 MG/1
25 TABLET, FILM COATED ORAL EVERY 6 HOURS
Qty: 15 TABLET | Refills: 0 | OUTPATIENT
Start: 2017-06-02 | End: 2020-02-05

## 2017-06-02 RX ADMIN — FLUCONAZOLE 200 MG: 200 TABLET ORAL at 02:06

## 2017-06-02 NOTE — ED PROVIDER NOTES
Encounter Date: 2017       History     Chief Complaint   Patient presents with    Rash     Pt stated that she was recently in the hospital for a rash to her right side of her neck. (She is unsure if it is shingles.) Pt stated that she was given a steroid and the rash is not improving. Reports a burning and itching.      Review of patient's allergies indicates:   Allergen Reactions    Decadron [dexamethasone] Other (See Comments)     syncope     72-year-old female with past medical history of diabetes presents to the ED with continued rash to the right lateral neck for the past approximately 2 weeks.  He reports that she initially thought area was shingles and went to urgent care where she received a Decadron shot.  She shortly had an adverse reaction subsequently resulting in her admission to the hospital for observation with improvement in all symptoms other than the rash.  She does report that she received unknown antibiotic during hospital stay and now states that she has a vaginal yeast infection.  She describes the rash as pruritic and now with a burning sensation after excoriating.  She describes no relief of symptoms with Benadryl.  She denies any new soaps or foods however does report wearing a new goldplated necklace approximately 1 day before rash began.  She denies any fever, chills, headache, neck rigidity, chest pain, shortness of breath, URI symptoms, arthralgias, myalgias or other involved locations.       The history is provided by the patient.     Past Medical History:   Diagnosis Date    Diabetes mellitus      Past Surgical History:   Procedure Laterality Date     SECTION      CHOLECYSTECTOMY       History reviewed. No pertinent family history.  Social History   Substance Use Topics    Smoking status: Former Smoker     Quit date: 6/10/2014    Smokeless tobacco: Not on file    Alcohol use No     Review of Systems   Constitutional: Negative for activity change, appetite change,  chills and fever.   HENT: Negative for facial swelling and sore throat.    Eyes: Negative for pain, redness and visual disturbance.   Respiratory: Negative for shortness of breath.    Cardiovascular: Negative for chest pain.   Gastrointestinal: Negative for nausea and vomiting.   Genitourinary: Negative for dysuria.   Musculoskeletal: Negative for arthralgias, back pain, myalgias, neck pain and neck stiffness.   Skin:        To the right lateral neck   Neurological: Negative for weakness, numbness and headaches.   Hematological: Does not bruise/bleed easily.       Physical Exam     Initial Vitals [06/02/17 1350]   BP Pulse Resp Temp SpO2   138/66 86 18 97.8 °F (36.6 °C) --     Physical Exam    Nursing note and vitals reviewed.  Constitutional: Vital signs are normal. She appears well-developed and well-nourished. She is cooperative.  Non-toxic appearance. She does not appear ill. No distress.   HENT:   Head: Normocephalic and atraumatic.   Mouth/Throat: Oropharynx is clear and moist. No oropharyngeal exudate.   Eyes: Conjunctivae and lids are normal.   Neck: Normal range of motion. Neck supple. No no neck rigidity.       Cervical region represent localized area of small papular rash with minor erythema,  excoriations and healed lesions noted.  No vesicular or pustular rash   Cardiovascular: Normal rate and regular rhythm.   Pulmonary/Chest: Breath sounds normal. No respiratory distress. She has no wheezes. She has no rhonchi.   Abdominal: Soft. Normal appearance and bowel sounds are normal. There is no tenderness. There is no rigidity and no guarding.   Musculoskeletal: Normal range of motion.   Lymphadenopathy:     She has no cervical adenopathy.   Neurological: She is alert and oriented to person, place, and time. GCS eye subscore is 4. GCS verbal subscore is 5. GCS motor subscore is 6.   Skin: Skin is warm, dry and intact. Rash noted. No petechiae and no purpura noted. Rash is papular. Rash is not pustular, not  vesicular and not urticarial. There is erythema.   Psychiatric: She has a normal mood and affect. Her speech is normal and behavior is normal. Thought content normal.         ED Course   Procedures  Labs Reviewed - No data to display          Medical Decision Making:   History:   Old Medical Records: I decided to obtain old medical records.  Old Records Summarized: records from previous admission(s).       <> Summary of Records: Reviewed patient's recent admission after adverse reaction from Decadron injection.  Initial Assessment:   72-year-old female with past medical history of diabetes presents to the ED with continued rash to the right lateral neck for the past approximately 2 weeks.  He reports that she initially thought area was shingles and went to urgent care where she received a Decadron shot.  She shortly had an adverse reaction subsequently resulting in her admission to the hospital for observation with improvement in all symptoms other than the rash.  She does report that she received unknown antibiotic during hospital stay and now states that she has a vaginal yeast infection.  She describes the rash as pruritic and now with a burning sensation after excoriating.  She describes no relief of symptoms with Benadryl.  She denies any new soaps or foods however does report wearing a new goldplated necklace approximately 1 day before rash began.  She denies any fever, chills, headache, neck rigidity, chest pain, shortness of breath, URI symptoms, arthralgias, myalgias or other involved locations.  Well-appearing 72-year-old female in no obvious distress with localized area of right lateral neck with minimal erythema and maculopapular rash with some excoriations noted.  No vesicles, pustules or extending erythema.  Differential Diagnosis:   Contact dermatitis, tinea, shingles  ED Management:  Diflucan in the ED.  We will send home a topical hydrocortisone for this probable contact dermatitis.  Instructed to  take Atarax instead of Benadryl to help with pruritus.  Instructed to keep follow-up with dermatologist should rash continue. Patient was cautioned on when to return to ED. Patient verbalized understanding and agreement with the treatment plan                Attending Attestation:     Physician Attestation Statement for NP/PA:   I discussed this assessment and plan of this patient with the NP/PA, but I did not personally examine the patient. The face to face encounter was performed by the NP/PA.    Other NP/PA Attestation Additions:    History of Present Illness: rash                   ED Course     Clinical Impression:   The primary encounter diagnosis was Contact dermatitis, unspecified contact dermatitis type, unspecified trigger. A diagnosis of Candida vaginitis was also pertinent to this visit.          CLAUDIA Carrillo  06/04/17 1914       Hardik Veloz III, MD  06/06/17 2053

## 2017-06-07 ENCOUNTER — HOSPITAL ENCOUNTER (EMERGENCY)
Facility: HOSPITAL | Age: 72
Discharge: HOME | End: 2017-06-07
Attending: EMERGENCY MEDICINE
Payer: MEDICARE

## 2017-06-07 VITALS
HEIGHT: 64 IN | OXYGEN SATURATION: 100 % | BODY MASS INDEX: 31.76 KG/M2 | WEIGHT: 186 LBS | SYSTOLIC BLOOD PRESSURE: 145 MMHG | HEART RATE: 88 BPM | DIASTOLIC BLOOD PRESSURE: 77 MMHG | TEMPERATURE: 98 F | RESPIRATION RATE: 18 BRPM

## 2017-06-07 DIAGNOSIS — E11.8 TYPE 2 DIABETES MELLITUS WITH COMPLICATION, WITHOUT LONG-TERM CURRENT USE OF INSULIN: ICD-10-CM

## 2017-06-07 DIAGNOSIS — R21 RASH: Primary | ICD-10-CM

## 2017-06-07 LAB
ALBUMIN SERPL BCP-MCNC: 3.9 G/DL
ALP SERPL-CCNC: 131 U/L
ALT SERPL W/O P-5'-P-CCNC: 16 U/L
ANION GAP SERPL CALC-SCNC: 11 MMOL/L
AST SERPL-CCNC: 20 U/L
BASOPHILS # BLD AUTO: 0.02 K/UL
BASOPHILS NFR BLD: 0.2 %
BILIRUB SERPL-MCNC: 0.4 MG/DL
BUN SERPL-MCNC: 14 MG/DL
CALCIUM SERPL-MCNC: 9.7 MG/DL
CHLORIDE SERPL-SCNC: 104 MMOL/L
CO2 SERPL-SCNC: 23 MMOL/L
CREAT SERPL-MCNC: 1.2 MG/DL
DIFFERENTIAL METHOD: NORMAL
EOSINOPHIL # BLD AUTO: 0.5 K/UL
EOSINOPHIL NFR BLD: 4.1 %
ERYTHROCYTE [DISTWIDTH] IN BLOOD BY AUTOMATED COUNT: 14.4 %
EST. GFR  (AFRICAN AMERICAN): 52 ML/MIN/1.73 M^2
EST. GFR  (NON AFRICAN AMERICAN): 45 ML/MIN/1.73 M^2
GLUCOSE SERPL-MCNC: 177 MG/DL
HCT VFR BLD AUTO: 41.1 %
HGB BLD-MCNC: 13.9 G/DL
LYMPHOCYTES # BLD AUTO: 3.5 K/UL
LYMPHOCYTES NFR BLD: 31.1 %
MCH RBC QN AUTO: 30.6 PG
MCHC RBC AUTO-ENTMCNC: 33.8 %
MCV RBC AUTO: 91 FL
MONOCYTES # BLD AUTO: 0.9 K/UL
MONOCYTES NFR BLD: 7.8 %
NEUTROPHILS # BLD AUTO: 6.3 K/UL
NEUTROPHILS NFR BLD: 56.4 %
PLATELET # BLD AUTO: 314 K/UL
PMV BLD AUTO: 9.2 FL
POTASSIUM SERPL-SCNC: 4.1 MMOL/L
PROT SERPL-MCNC: 7.9 G/DL
RBC # BLD AUTO: 4.54 M/UL
SODIUM SERPL-SCNC: 138 MMOL/L
WBC # BLD AUTO: 11.17 K/UL

## 2017-06-07 PROCEDURE — 96374 THER/PROPH/DIAG INJ IV PUSH: CPT

## 2017-06-07 PROCEDURE — 85025 COMPLETE CBC W/AUTO DIFF WBC: CPT

## 2017-06-07 PROCEDURE — 96375 TX/PRO/DX INJ NEW DRUG ADDON: CPT

## 2017-06-07 PROCEDURE — 99284 EMERGENCY DEPT VISIT MOD MDM: CPT | Mod: 25

## 2017-06-07 PROCEDURE — 63600175 PHARM REV CODE 636 W HCPCS: Performed by: EMERGENCY MEDICINE

## 2017-06-07 PROCEDURE — 82962 GLUCOSE BLOOD TEST: CPT

## 2017-06-07 PROCEDURE — 80053 COMPREHEN METABOLIC PANEL: CPT

## 2017-06-07 RX ORDER — ONDANSETRON 2 MG/ML
4 INJECTION INTRAMUSCULAR; INTRAVENOUS
Status: COMPLETED | OUTPATIENT
Start: 2017-06-07 | End: 2017-06-07

## 2017-06-07 RX ORDER — OXYCODONE AND ACETAMINOPHEN 5; 325 MG/1; MG/1
1 TABLET ORAL EVERY 4 HOURS PRN
Qty: 20 TABLET | Refills: 0 | OUTPATIENT
Start: 2017-06-07 | End: 2020-02-05

## 2017-06-07 RX ORDER — MORPHINE SULFATE 2 MG/ML
2 INJECTION, SOLUTION INTRAMUSCULAR; INTRAVENOUS
Status: COMPLETED | OUTPATIENT
Start: 2017-06-07 | End: 2017-06-07

## 2017-06-07 RX ORDER — DIPHENHYDRAMINE HYDROCHLORIDE 50 MG/ML
25 INJECTION INTRAMUSCULAR; INTRAVENOUS
Status: COMPLETED | OUTPATIENT
Start: 2017-06-07 | End: 2017-06-07

## 2017-06-07 RX ADMIN — DIPHENHYDRAMINE HYDROCHLORIDE 25 MG: 50 INJECTION, SOLUTION INTRAMUSCULAR; INTRAVENOUS at 05:06

## 2017-06-07 RX ADMIN — MORPHINE SULFATE 2 MG: 2 INJECTION, SOLUTION INTRAMUSCULAR; INTRAVENOUS at 03:06

## 2017-06-07 RX ADMIN — ONDANSETRON 4 MG: 2 INJECTION INTRAMUSCULAR; INTRAVENOUS at 03:06

## 2017-06-07 NOTE — ED NOTES
APPEARANCE: Alert, oriented and in no acute distress.  CARDIAC: Normal rate and rhythm, no murmur heard.   PERIPHERAL VASCULAR: peripheral pulses present. Normal cap refill. No edema. Warm to touch.    RESPIRATORY:Normal rate and effort, breath sounds clear bilaterally throughout chest. Respirations are equal and unlabored no obvious signs of distress.  GASTRO: soft, bowel sounds normal, no tenderness, no abdominal distention.  MUSC: Full ROM. No bony tenderness or soft tissue tenderness. No obvious deformity.  NEURO: 5/5 strength major flexors/extensors bilaterally. Sensory intact to light touch bilaterally. [place] coma scale: eyes open spontaneously-4, oriented & converses-5, obeys commands-6. No neurological abnormalities.   MENTAL STATUS: awake, alert and aware of environment.  EYE: PERRL, both eyes: pupils brisk and reactive to light. Normal size.  ENT: EARS: no obvious drainage. NOSE: no active bleeding.

## 2017-06-08 LAB — POCT GLUCOSE: 245 MG/DL (ref 70–110)

## 2017-06-08 NOTE — ED PROVIDER NOTES
Encounter Date: 2017       History     Chief Complaint   Patient presents with    Rash     Pt reports a persistant itching rash to her neck and chest. Pt has been admitted and returned back to the ER for a second time with no releief.      Review of patient's allergies indicates:   Allergen Reactions    Decadron [dexamethasone] Other (See Comments)     syncope     72-year-old female who complains of a painful rash to the right side of her neck.  Patient has been seen many times for this, including a visit with her dermatologist yesterday.  She is currently using a steroid cream on the area as well as taking Atarax for itching.  She says she has nothing prescribed for pain.  No fever or chills.  She is also concerned because she has diabetes and blood sugars have been elevated in spite of taking the medication.      The history is provided by the patient.     Past Medical History:   Diagnosis Date    Diabetes mellitus      Past Surgical History:   Procedure Laterality Date     SECTION      CHOLECYSTECTOMY       History reviewed. No pertinent family history.  Social History   Substance Use Topics    Smoking status: Former Smoker     Quit date: 6/10/2014    Smokeless tobacco: Not on file    Alcohol use No     Review of Systems   Constitutional: Negative for chills and fever.   HENT: Negative for sore throat.    Gastrointestinal: Negative for nausea and vomiting.   Skin: Positive for rash.       Physical Exam     Initial Vitals [17 1404]   BP Pulse Resp Temp SpO2   (!) 147/80 90 18 98.2 °F (36.8 °C) 98 %     Physical Exam    Nursing note and vitals reviewed.  HENT:   Head: Atraumatic.   Eyes: EOM are normal.   Neck: Neck supple.   Cardiovascular: Normal rate, regular rhythm and normal heart sounds.   Pulmonary/Chest: Breath sounds normal.   Abdominal: Soft. There is no tenderness.   Musculoskeletal: Normal range of motion.   Neurological: She is alert and oriented to person, place, and time.    Skin:   There is a large oval rash the base of the neck on the right side with dried skin and excoriations.  No pustules or vesicles.   Psychiatric: Her behavior is normal. Thought content normal.         ED Course   Procedures  Labs Reviewed   COMPREHENSIVE METABOLIC PANEL - Abnormal; Notable for the following:        Result Value    Glucose 177 (*)     eGFR if  52 (*)     eGFR if non  45 (*)     All other components within normal limits   CBC W/ AUTO DIFFERENTIAL             Medical Decision Making:   ED Management:  72-year-old female with a persistent rash.  She has a lot of pain associated with this and I will prescribe 5 mg Percocet for this.  Have also supplied her with the contact information for Dr. Fracisco Vines to follow-up with.                   ED Course     Clinical Impression:   The primary encounter diagnosis was Rash. A diagnosis of Type 2 diabetes mellitus with complication, without long-term current use of insulin was also pertinent to this visit.          Jareth Tomlinson MD  06/07/17 1153

## 2018-11-15 NOTE — PLAN OF CARE
Consultation - Urology   Mallory Perez 47 y o  male MRN: [id number]  Unit/Bed#: -01 Encounter: [id number]      Assessment/Plan      Assessment:  Left renal colic secondary to 8 mm left ureteropelvic junction stone with very minimal hydronephrosis  Right lower chest and right upper quadrant abdominal pain currently undergoing rule out cholecystitis  Plan:  Await HIDA scan  Obtain abdominal x-ray  Bladder scan for postvoid residual will be obtained  Options, procedure, benefits and risks were discussed with the patient and we will plan cystoscopy with left retrograde urogram and insertion of left ureteral stent  Timing of this procedure depends upon whether or not he requires cholecystectomy at the same time  The patient understands that the stone will not be treated at this time and he will require a 2nd procedure, most likely outpatient shockwave lithotripsy  History of Present Illness   Attending: Carol Aceves MD  Reason for Consult / Principal Problem:   Left renal stone  HPI: Mallory Perez is a 47y o  year old male who presents with acute onset of right lower chest and right upper quadrant abdominal pain  However, he also relates a several week history of intermittent and intermittently severe left flank pain  He denies any associated nausea, vomiting, fever or chills  CT scan upon admission in the emergency room does show an 8 mm stone at the left ureteral pelvic junction  However, there is minimal dilation of the renal pelvis and minimal dilation of the lower pole calices  The patient has a long history of urinary frequency, nocturia, and decreased urinary flow  He has been on tamsulosin  He has never seen a urologist in the past   He denies any dysuria or hematuria  He states that he did have a left kidney stone discovered incidentally a couple of years ago and was told that it was very small  He has no other history of kidney stones    Urinalysis in the emergency room shows Problem: Patient Care Overview  Goal: Plan of Care Review  No true red alarm noted on telemetry. NSR noted.       positive nitrite, but leukocytes are negative and there are minimal white blood cells and bacteria microscopically  Urine culture is pending  This could be a false-positive nitrite on urinalysis, as the patient has an otherwise benign urinalysis and there are no other symptoms of urinary tract infection  Consults    Review of Systems   Gastrointestinal: Positive for abdominal pain  Negative for abdominal distention  Genitourinary: Positive for difficulty urinating, flank pain and frequency  Negative for dysuria and hematuria         Historical Information   Past Medical History:   Diagnosis Date    Anxiety 5/2/2018    Arthritis     Chest pain     Last Assessed: 10/5/2016    Hypertension     Kidney stone     Obesity     Pleural effusion     Last Assessed: 9/15/2017    Pneumonia     Sciatica      Past Surgical History:   Procedure Laterality Date    CHEST TUBE INSERTION      Last Assessed: 9/15/2017    HERNIA REPAIR  2011    LUMBAR LAMINECTOMY  2009    TONSILLECTOMY      Last Assessed: 9/15/2017     Social History   History   Alcohol Use No     Comment: Former consumption of alcohol     History   Drug Use    Types: Marijuana     History   Smoking Status    Current Every Day Smoker    Packs/day: 0 50    Types: Cigarettes   Smokeless Tobacco    Never Used     Family History: non-contributory    Meds/Allergies   current meds:   Current Facility-Administered Medications   Medication Dose Route Frequency    cloNIDine (CATAPRES) tablet 0 3 mg  0 3 mg Oral BID    diphenhydrAMINE (BENADRYL) injection 50 mg  50 mg Intravenous Q6H PRN    DULoxetine (CYMBALTA) delayed release capsule 60 mg  60 mg Oral Daily    heparin (porcine) subcutaneous injection 5,000 Units  5,000 Units Subcutaneous Q8H [facility]    influenza vaccine, recombinant, quadrivalent (FLUBLOK) IM injection 0 5 mL  0 5 mL Intramuscular Prior to discharge    lactated ringers infusion  150 mL/hr Intravenous Continuous    LORazepam (ATIVAN) 2 mg/mL injection 1 mg  1 mg Intravenous Q6H PRN    magnesium hydroxide (MILK OF MAGNESIA) 400 mg/5 mL oral suspension 30 mL  30 mL Oral Daily PRN    metoprolol tartrate (LOPRESSOR) tablet 50 mg  50 mg Oral BID    nicotine (NICODERM CQ) 21 mg/24 hr TD 24 hr patch 21 mg  21 mg Transdermal Daily    ondansetron (ZOFRAN) injection 4 mg  4 mg Intravenous Once PRN    ondansetron (ZOFRAN) injection 4 mg  4 mg Intravenous Q4H PRN    piperacillin-tazobactam (ZOSYN) 3 375 g in sodium chloride 0 9 % 50 mL IVPB  3 375 g Intravenous Q6H    pneumococcal 23-valent polysaccharide vaccine (PNEUMOVAX-23) injection 0 5 mL  0 5 mL Subcutaneous Prior to discharge    pregabalin (LYRICA) capsule 200 mg  200 mg Oral BID    sodium chloride 0 9 % infusion  125 mL/hr Intravenous Continuous    tamsulosin (FLOMAX) capsule 0 4 mg  0 4 mg Oral Daily     Allergies   Allergen Reactions    Codeine Itching    Suboxone [Buprenorphine Hcl-Naloxone Hcl]      Dug self apart    Toradol [Ketorolac Tromethamine]      Itching and hives       Objective   Vitals: Blood pressure 140/92, pulse 63, temperature 98 6 °F (37 °C), temperature source Tympanic, resp  rate 18, height 6' (1 829 m), weight 133 kg (292 lb 2 oz), SpO2 97 %  I/O last 24 hours: In: 5509 [P O :240; IV Piggyback:1050]  Out: -     Invasive Devices     Peripheral Intravenous Line            Peripheral IV 11/13/18 Left Antecubital less than 1 day                Physical Exam   Abdominal: He exhibits no distension  There is tenderness  Genitourinary: Penis normal     mild tenderness in the left upper quadrant and left flank  Normal testicles and spermatic cords bilaterally  Prostate 1+ enlarged firm smooth nontender without masses    Lab Results:   CBC:   Lab Results   Component Value Date    WBC 9 10 11/14/2018    HGB 13 5 (L) 11/14/2018    HCT 41 2 11/14/2018    MCV 93 11/14/2018     11/14/2018    MCH 30 5 11/14/2018    MCHC 32 9 11/14/2018    RDW 13 8 11/14/2018    MPV 8 6 11/14/2018    NRBC 0 11/14/2018     CMP:   Lab Results   Component Value Date    SODIUM 135 11/14/2018     11/14/2018    CO2 27 11/14/2018    BUN 16 11/14/2018    CREATININE 1 32 (H) 11/14/2018    CALCIUM 8 7 11/14/2018    AST 16 11/14/2018    ALT 19 11/14/2018    ALKPHOS 68 11/14/2018    EGFR 61 11/14/2018     Urinalysis:   Lab Results   Component Value Date    COLORU Melanie (A) 11/13/2018    CLARITYU Cloudy (A) 11/13/2018    SPECGRAV >=1 030 (H) 11/13/2018    PHUR 5 5 11/13/2018    LEUKOCYTESUR Negative 11/13/2018    NITRITE Positive (A) 11/13/2018    GLUCOSEU Negative 11/13/2018    KETONESU Negative 11/13/2018    BILIRUBINUR 1+ (A) 11/13/2018    BLOODU 3+ (A) 11/13/2018     Imaging Studies: I have personally reviewed pertinent reports  EKG, Pathology, and Other Studies: I have personally reviewed pertinent reports  VTE Prophylaxis: Sequential compression device Princess Carmona)     Code Status: No Order  Advance Directive and Living Will:      Power of :    POLST:      Counseling / Coordination of Care  Total floor / unit time spent today 45 minutes  Greater than 50% of total time was spent with the patient and / or family counseling and / or coordination of care  A description of the counseling / coordination of care:   I have discussed the case with Dr Renetta Castillo

## 2018-12-13 ENCOUNTER — HOSPITAL ENCOUNTER (EMERGENCY)
Facility: HOSPITAL | Age: 73
Discharge: HOME | End: 2018-12-14
Attending: FAMILY MEDICINE
Payer: MEDICARE

## 2018-12-13 DIAGNOSIS — R07.89 CHEST DISCOMFORT: ICD-10-CM

## 2018-12-13 DIAGNOSIS — R53.1 WEAKNESS: ICD-10-CM

## 2018-12-13 DIAGNOSIS — R07.9 CHEST PAIN: ICD-10-CM

## 2018-12-13 DIAGNOSIS — F41.9 ANXIETY: Primary | ICD-10-CM

## 2018-12-13 LAB
BASOPHILS # BLD AUTO: 0.02 K/UL
BASOPHILS NFR BLD: 0.2 %
DIFFERENTIAL METHOD: ABNORMAL
EOSINOPHIL # BLD AUTO: 0.2 K/UL
EOSINOPHIL NFR BLD: 1.5 %
ERYTHROCYTE [DISTWIDTH] IN BLOOD BY AUTOMATED COUNT: 14.2 %
HCT VFR BLD AUTO: 36.8 %
HGB BLD-MCNC: 11.8 G/DL
LYMPHOCYTES # BLD AUTO: 3.5 K/UL
LYMPHOCYTES NFR BLD: 33.8 %
MCH RBC QN AUTO: 29.9 PG
MCHC RBC AUTO-ENTMCNC: 32.1 G/DL
MCV RBC AUTO: 93 FL
MONOCYTES # BLD AUTO: 0.9 K/UL
MONOCYTES NFR BLD: 8.4 %
NEUTROPHILS # BLD AUTO: 5.8 K/UL
NEUTROPHILS NFR BLD: 56 %
PLATELET # BLD AUTO: 256 K/UL
PMV BLD AUTO: 8.8 FL
POCT GLUCOSE: 102 MG/DL (ref 70–110)
RBC # BLD AUTO: 3.95 M/UL
WBC # BLD AUTO: 10.31 K/UL

## 2018-12-13 PROCEDURE — 96374 THER/PROPH/DIAG INJ IV PUSH: CPT

## 2018-12-13 PROCEDURE — 80053 COMPREHEN METABOLIC PANEL: CPT

## 2018-12-13 PROCEDURE — 93010 ELECTROCARDIOGRAM REPORT: CPT | Mod: ,,, | Performed by: INTERNAL MEDICINE

## 2018-12-13 PROCEDURE — 63600175 PHARM REV CODE 636 W HCPCS: Performed by: FAMILY MEDICINE

## 2018-12-13 PROCEDURE — 99285 EMERGENCY DEPT VISIT HI MDM: CPT | Mod: 25

## 2018-12-13 PROCEDURE — 82962 GLUCOSE BLOOD TEST: CPT

## 2018-12-13 PROCEDURE — 96375 TX/PRO/DX INJ NEW DRUG ADDON: CPT

## 2018-12-13 PROCEDURE — 85025 COMPLETE CBC W/AUTO DIFF WBC: CPT

## 2018-12-13 PROCEDURE — 93005 ELECTROCARDIOGRAM TRACING: CPT

## 2018-12-13 PROCEDURE — 84484 ASSAY OF TROPONIN QUANT: CPT

## 2018-12-13 PROCEDURE — 85730 THROMBOPLASTIN TIME PARTIAL: CPT

## 2018-12-13 PROCEDURE — 85610 PROTHROMBIN TIME: CPT

## 2018-12-13 RX ORDER — ONDANSETRON 2 MG/ML
4 INJECTION INTRAMUSCULAR; INTRAVENOUS
Status: COMPLETED | OUTPATIENT
Start: 2018-12-13 | End: 2018-12-13

## 2018-12-13 RX ADMIN — ONDANSETRON 4 MG: 2 INJECTION INTRAMUSCULAR; INTRAVENOUS at 11:12

## 2018-12-14 VITALS
BODY MASS INDEX: 33.13 KG/M2 | HEART RATE: 66 BPM | OXYGEN SATURATION: 97 % | DIASTOLIC BLOOD PRESSURE: 70 MMHG | WEIGHT: 180 LBS | RESPIRATION RATE: 16 BRPM | HEIGHT: 62 IN | TEMPERATURE: 98 F | SYSTOLIC BLOOD PRESSURE: 128 MMHG

## 2018-12-14 LAB
ALBUMIN SERPL BCP-MCNC: 3.7 G/DL
ALP SERPL-CCNC: 123 U/L
ALT SERPL W/O P-5'-P-CCNC: 15 U/L
ANION GAP SERPL CALC-SCNC: 4 MMOL/L
APTT BLDCRRT: 35.4 SEC
AST SERPL-CCNC: 18 U/L
BACTERIA #/AREA URNS AUTO: NORMAL /HPF
BILIRUB SERPL-MCNC: 0.2 MG/DL
BILIRUB UR QL STRIP: NEGATIVE
BUN SERPL-MCNC: 13 MG/DL
CALCIUM SERPL-MCNC: 8.7 MG/DL
CHLORIDE SERPL-SCNC: 108 MMOL/L
CLARITY UR REFRACT.AUTO: CLEAR
CO2 SERPL-SCNC: 28 MMOL/L
COLOR UR AUTO: ABNORMAL
CREAT SERPL-MCNC: 0.86 MG/DL
EST. GFR  (AFRICAN AMERICAN): >60 ML/MIN/1.73 M^2
EST. GFR  (NON AFRICAN AMERICAN): >60 ML/MIN/1.73 M^2
GLUCOSE SERPL-MCNC: 104 MG/DL
GLUCOSE UR QL STRIP: NEGATIVE
HGB UR QL STRIP: NEGATIVE
INR PPP: 1.2
KETONES UR QL STRIP: NEGATIVE
LEUKOCYTE ESTERASE UR QL STRIP: ABNORMAL
MICROSCOPIC COMMENT: NORMAL
NITRITE UR QL STRIP: NEGATIVE
PH UR STRIP: 6 [PH] (ref 5–8)
POTASSIUM SERPL-SCNC: 3.7 MMOL/L
PROT SERPL-MCNC: 6.6 G/DL
PROT UR QL STRIP: NEGATIVE
PROTHROMBIN TIME: 13.2 SEC
SODIUM SERPL-SCNC: 140 MMOL/L
SP GR UR STRIP: 1.01 (ref 1–1.03)
TROPONIN I SERPL DL<=0.01 NG/ML-MCNC: <0.012 NG/ML
TROPONIN I SERPL DL<=0.01 NG/ML-MCNC: <0.012 NG/ML
URN SPEC COLLECT METH UR: ABNORMAL
UROBILINOGEN UR STRIP-ACNC: NEGATIVE EU/DL
WBC #/AREA URNS AUTO: 1 /HPF (ref 0–5)

## 2018-12-14 PROCEDURE — C9113 INJ PANTOPRAZOLE SODIUM, VIA: HCPCS | Performed by: FAMILY MEDICINE

## 2018-12-14 PROCEDURE — 63600175 PHARM REV CODE 636 W HCPCS: Performed by: FAMILY MEDICINE

## 2018-12-14 PROCEDURE — 81000 URINALYSIS NONAUTO W/SCOPE: CPT

## 2018-12-14 PROCEDURE — 84484 ASSAY OF TROPONIN QUANT: CPT

## 2018-12-14 PROCEDURE — 25000003 PHARM REV CODE 250: Performed by: FAMILY MEDICINE

## 2018-12-14 RX ORDER — ALPRAZOLAM 0.25 MG/1
0.5 TABLET ORAL
Status: COMPLETED | OUTPATIENT
Start: 2018-12-14 | End: 2018-12-14

## 2018-12-14 RX ORDER — ALPRAZOLAM 1 MG/1
TABLET ORAL
Status: DISCONTINUED
Start: 2018-12-14 | End: 2018-12-14 | Stop reason: HOSPADM

## 2018-12-14 RX ORDER — PANTOPRAZOLE SODIUM 40 MG/10ML
40 INJECTION, POWDER, LYOPHILIZED, FOR SOLUTION INTRAVENOUS
Status: COMPLETED | OUTPATIENT
Start: 2018-12-14 | End: 2018-12-14

## 2018-12-14 RX ORDER — ALPRAZOLAM 0.25 MG/1
0.25 TABLET ORAL NIGHTLY PRN
Qty: 5 TABLET | Refills: 0 | OUTPATIENT
Start: 2018-12-14 | End: 2020-02-05

## 2018-12-14 RX ORDER — ALPRAZOLAM 0.25 MG/1
0.5 TABLET ORAL
Status: DISCONTINUED | OUTPATIENT
Start: 2018-12-14 | End: 2018-12-14

## 2018-12-14 RX ADMIN — ALPRAZOLAM 0.5 MG: 1 TABLET ORAL at 01:12

## 2018-12-14 RX ADMIN — LIDOCAINE HYDROCHLORIDE: 20 SOLUTION ORAL; TOPICAL at 03:12

## 2018-12-14 RX ADMIN — PANTOPRAZOLE SODIUM 40 MG: 40 INJECTION, POWDER, FOR SOLUTION INTRAVENOUS at 03:12

## 2018-12-14 NOTE — ED PROVIDER NOTES
"Encounter Date: 2018       History     Chief Complaint   Patient presents with    Fatigue     c/o generalized weakness today; states she thought it was her blood sugar so she ate peanut butter and peppermints without improvement; also c/o "chest weakness" denies pain at this time; also c/o nausea     Patient complains of generalized body weakness since 7:00 p.m..  Patient claims she has been exhausted all day and was tired.  Mild chest pain started around 7 p.m..  Does not the main problem she is coming.  Mild nausea.  No shortness of breath, no fever, no abdominal pain, no focal weakness.  No slurring of speech.  No blurring of vision.  Mild dizziness.  Patient is known diabetic and thought her blood sugar was low so she took peanut butter and peppermint before coming in.      The history is provided by the patient.     Review of patient's allergies indicates:   Allergen Reactions    Decadron [dexamethasone] Other (See Comments)     syncope     Past Medical History:   Diagnosis Date    Diabetes mellitus      Past Surgical History:   Procedure Laterality Date     SECTION      CHOLECYSTECTOMY       No family history on file.  Social History     Tobacco Use    Smoking status: Former Smoker     Last attempt to quit: 6/10/2014     Years since quittin.5   Substance Use Topics    Alcohol use: No    Drug use: No     Review of Systems   Constitutional: Negative for activity change, appetite change, chills and fever.   HENT: Negative for congestion, ear discharge, rhinorrhea, sinus pressure, sinus pain and sore throat.    Eyes: Negative for pain and redness.   Respiratory: Negative for cough, shortness of breath and wheezing.    Cardiovascular: Positive for chest pain. Negative for palpitations.   Gastrointestinal: Positive for nausea. Negative for abdominal distention, abdominal pain, blood in stool, constipation, diarrhea and vomiting.   Genitourinary: Negative for dysuria, flank pain and " frequency.   Musculoskeletal: Negative for back pain, gait problem, neck pain and neck stiffness.   Skin: Negative for rash.   Neurological: Positive for dizziness and weakness. Negative for light-headedness and headaches.   Psychiatric/Behavioral: Negative for confusion. The patient is not nervous/anxious.        Physical Exam     Initial Vitals [12/13/18 2325]   BP Pulse Resp Temp SpO2   129/63 79 18 98 °F (36.7 °C) 99 %      MAP       --         Physical Exam    Nursing note and vitals reviewed.  Constitutional: She appears well-developed and well-nourished.   HENT:   Head: Normocephalic.   Nose: Nose normal.   Mouth/Throat: Oropharynx is clear and moist.   Eyes: Conjunctivae and EOM are normal. Pupils are equal, round, and reactive to light.   Neck: Normal range of motion. Neck supple.   Cardiovascular: Normal rate, regular rhythm, normal heart sounds and intact distal pulses.   Pulmonary/Chest: Breath sounds normal. No respiratory distress. She has no wheezes. She has no rhonchi. She has no rales. She exhibits no tenderness.   Abdominal: Soft. Bowel sounds are normal. She exhibits no distension. There is no tenderness. There is no guarding.   Musculoskeletal: Normal range of motion. She exhibits no tenderness.   Neurological: She is alert and oriented to person, place, and time. She has normal strength and normal reflexes. No cranial nerve deficit or sensory deficit. GCS score is 15. GCS eye subscore is 4. GCS verbal subscore is 5. GCS motor subscore is 6.   Skin: Skin is warm. Capillary refill takes less than 2 seconds. No rash noted. No erythema.   Psychiatric: She has a normal mood and affect. Her behavior is normal. Judgment and thought content normal.         ED Course   Procedures  Labs Reviewed   CBC W/ AUTO DIFFERENTIAL - Abnormal; Notable for the following components:       Result Value    RBC 3.95 (*)     Hemoglobin 11.8 (*)     Hematocrit 36.8 (*)     MPV 8.8 (*)     All other components within  normal limits   COMPREHENSIVE METABOLIC PANEL - Abnormal; Notable for the following components:    Anion Gap 4 (*)     All other components within normal limits   APTT - Abnormal; Notable for the following components:    aPTT 35.4 (*)     All other components within normal limits   PROTIME-INR - Abnormal; Notable for the following components:    Prothrombin Time 13.2 (*)     All other components within normal limits   TROPONIN I   URINALYSIS, REFLEX TO URINE CULTURE   POCT GLUCOSE   POCT GLUCOSE MONITORING CONTINUOUS     EKG Readings: (Independently Interpreted)   Initial Reading: No STEMI. Rhythm: Normal Sinus Rhythm. Heart Rate: 79. Ectopy: No Ectopy. Conduction: Normal. ST Segments: Normal ST Segments. T Waves: Normal. Axis: Right Axis Deviation. Clinical Impression: Normal Sinus Rhythm       Imaging Results    None          Medical Decision Making:   Initial Assessment:   73-year-old female presents to ER with generalized weakness and chest tightness since few hours.  No shortness of breath. Patient claims she has been working hard and a being stressed a lot.  Denies shortness of breath. No cough or fever.  Differential Diagnosis:   Anxiety, chest wall pain, angina, myocardial infraction, GERD musculoskeletal pain.  Clinical Tests:   Lab Tests: Ordered and Reviewed  Radiological Study: Ordered and Reviewed  Medical Tests: Ordered and Reviewed  ED Management:  Patient had a complete cardiac workup with repeated cardiac enzymes negative along with no acute changes on EKG.  Normal chest x-ray.  Patient's symptoms persisted in the ER even after treating with Xanax.  Patient symptoms improved after GI cocktail and Pepcid.  Most likely noncardiac , GERD.  Patient is given prescription for pain, anxiety advised to follow up with primary care physician or to the ER if symptoms worsen.                   ED Course as of Dec 14 0116   Fri Dec 14, 2018   0110 Mild tightness of the chest.  Cardiac workup is negative. Patient  claims she has a lot of stress at home and works a lot.  Will try Xanax and see how she does.  [SP]      ED Course User Index  [SP] Venkatesh Maldonado MD     Clinical Impression:   The primary encounter diagnosis was Anxiety. Diagnoses of Chest discomfort, Weakness, and Chest pain were also pertinent to this visit.      Disposition:   Disposition: Discharged  Condition: Fair                        Venkatesh Maldonado MD  12/15/18 8531

## 2018-12-14 NOTE — ED NOTES
Pt resting on stretcher, medication administered as ordered, pt repositioned on stretcher for comfort, lights dimmed for pt comfort, nadn, resp e/u. Pt voiced no needs, family at the bedside, bed low and locked, Sr up x2, call light within reach, will ctm

## 2018-12-14 NOTE — ED NOTES
Pt wheeled to restroom for UA specimen but missed the cup. Pt awake and alert, nadn, resp e/u. Pt voiced no needs, bed low and locked, Sr up x2, call light within reach, will ctm.

## 2020-02-05 ENCOUNTER — HOSPITAL ENCOUNTER (EMERGENCY)
Facility: HOSPITAL | Age: 75
Discharge: HOME | End: 2020-02-05
Attending: EMERGENCY MEDICINE
Payer: MEDICARE

## 2020-02-05 VITALS
TEMPERATURE: 98 F | DIASTOLIC BLOOD PRESSURE: 83 MMHG | WEIGHT: 180 LBS | OXYGEN SATURATION: 97 % | RESPIRATION RATE: 18 BRPM | HEIGHT: 63 IN | SYSTOLIC BLOOD PRESSURE: 125 MMHG | HEART RATE: 89 BPM | BODY MASS INDEX: 31.89 KG/M2

## 2020-02-05 DIAGNOSIS — R53.1 GENERALIZED WEAKNESS: Primary | ICD-10-CM

## 2020-02-05 LAB
ALBUMIN SERPL BCP-MCNC: 4.1 G/DL (ref 3.5–5.2)
ALP SERPL-CCNC: 155 U/L (ref 38–126)
ALT SERPL W/O P-5'-P-CCNC: 17 U/L (ref 10–44)
ANION GAP SERPL CALC-SCNC: 6 MMOL/L (ref 8–16)
AST SERPL-CCNC: 23 U/L (ref 15–46)
BACTERIA #/AREA URNS AUTO: NORMAL /HPF
BASOPHILS # BLD AUTO: 0.04 K/UL (ref 0–0.2)
BASOPHILS NFR BLD: 0.4 % (ref 0–1.9)
BILIRUB SERPL-MCNC: 0.2 MG/DL (ref 0.1–1)
BILIRUB UR QL STRIP: NEGATIVE
BUN SERPL-MCNC: 18 MG/DL (ref 7–17)
CALCIUM SERPL-MCNC: 9.2 MG/DL (ref 8.7–10.5)
CHLORIDE SERPL-SCNC: 104 MMOL/L (ref 95–110)
CLARITY UR REFRACT.AUTO: CLEAR
CO2 SERPL-SCNC: 29 MMOL/L (ref 23–29)
COLOR UR AUTO: YELLOW
CREAT SERPL-MCNC: 1.02 MG/DL (ref 0.5–1.4)
DIFFERENTIAL METHOD: ABNORMAL
EOSINOPHIL # BLD AUTO: 0.2 K/UL (ref 0–0.5)
EOSINOPHIL NFR BLD: 1.8 % (ref 0–8)
ERYTHROCYTE [DISTWIDTH] IN BLOOD BY AUTOMATED COUNT: 13.4 % (ref 11.5–14.5)
EST. GFR  (AFRICAN AMERICAN): >60 ML/MIN/1.73 M^2
EST. GFR  (NON AFRICAN AMERICAN): 54.3 ML/MIN/1.73 M^2
GLUCOSE SERPL-MCNC: 154 MG/DL (ref 70–110)
GLUCOSE UR QL STRIP: NEGATIVE
HCT VFR BLD AUTO: 40.6 % (ref 37–48.5)
HGB BLD-MCNC: 13.1 G/DL (ref 12–16)
HGB UR QL STRIP: NEGATIVE
IMM GRANULOCYTES # BLD AUTO: 0.05 K/UL (ref 0–0.04)
IMM GRANULOCYTES NFR BLD AUTO: 0.5 % (ref 0–0.5)
KETONES UR QL STRIP: NEGATIVE
LEUKOCYTE ESTERASE UR QL STRIP: ABNORMAL
LYMPHOCYTES # BLD AUTO: 3.5 K/UL (ref 1–4.8)
LYMPHOCYTES NFR BLD: 32.2 % (ref 18–48)
MCH RBC QN AUTO: 30.4 PG (ref 27–31)
MCHC RBC AUTO-ENTMCNC: 32.3 G/DL (ref 32–36)
MCV RBC AUTO: 94 FL (ref 82–98)
MICROSCOPIC COMMENT: NORMAL
MONOCYTES # BLD AUTO: 0.8 K/UL (ref 0.3–1)
MONOCYTES NFR BLD: 7.6 % (ref 4–15)
NEUTROPHILS # BLD AUTO: 6.3 K/UL (ref 1.8–7.7)
NEUTROPHILS NFR BLD: 57.5 % (ref 38–73)
NITRITE UR QL STRIP: NEGATIVE
NRBC BLD-RTO: 0 /100 WBC
PH UR STRIP: 5 [PH] (ref 5–8)
PLATELET # BLD AUTO: 282 K/UL (ref 150–350)
PMV BLD AUTO: 8.6 FL (ref 9.2–12.9)
POCT GLUCOSE: 152 MG/DL (ref 70–110)
POTASSIUM SERPL-SCNC: 4.2 MMOL/L (ref 3.5–5.1)
PROT SERPL-MCNC: 7.5 G/DL (ref 6–8.4)
PROT UR QL STRIP: ABNORMAL
RBC # BLD AUTO: 4.31 M/UL (ref 4–5.4)
RBC #/AREA URNS AUTO: 1 /HPF (ref 0–4)
SODIUM SERPL-SCNC: 139 MMOL/L (ref 136–145)
SP GR UR STRIP: 1.02 (ref 1–1.03)
SQUAMOUS #/AREA URNS AUTO: NORMAL /HPF
TROPONIN I SERPL DL<=0.01 NG/ML-MCNC: <0.012 NG/ML (ref 0.01–0.03)
URN SPEC COLLECT METH UR: ABNORMAL
UROBILINOGEN UR STRIP-ACNC: NEGATIVE EU/DL
WBC # BLD AUTO: 10.92 K/UL (ref 3.9–12.7)
WBC #/AREA URNS AUTO: 1 /HPF (ref 0–5)

## 2020-02-05 PROCEDURE — 93010 EKG 12-LEAD: ICD-10-PCS | Mod: ,,, | Performed by: INTERNAL MEDICINE

## 2020-02-05 PROCEDURE — 80053 COMPREHEN METABOLIC PANEL: CPT | Mod: ER

## 2020-02-05 PROCEDURE — 93010 ELECTROCARDIOGRAM REPORT: CPT | Mod: ,,, | Performed by: INTERNAL MEDICINE

## 2020-02-05 PROCEDURE — 99285 EMERGENCY DEPT VISIT HI MDM: CPT | Mod: 25,ER

## 2020-02-05 PROCEDURE — 85025 COMPLETE CBC W/AUTO DIFF WBC: CPT | Mod: ER

## 2020-02-05 PROCEDURE — 93005 ELECTROCARDIOGRAM TRACING: CPT | Mod: ER

## 2020-02-05 PROCEDURE — 81000 URINALYSIS NONAUTO W/SCOPE: CPT | Mod: ER

## 2020-02-05 PROCEDURE — 93005 ELECTROCARDIOGRAM TRACING: CPT | Mod: ER | Performed by: INTERNAL MEDICINE

## 2020-02-05 PROCEDURE — 84484 ASSAY OF TROPONIN QUANT: CPT | Mod: ER

## 2020-02-05 PROCEDURE — 82962 GLUCOSE BLOOD TEST: CPT | Mod: ER

## 2020-02-06 NOTE — ED PROVIDER NOTES
Encounter Date: 2020       History     Chief Complaint   Patient presents with    Weakness     c/o feeling weak, tired, and nauseated. was recently taken off 2 diabetic med (can't remember) on Thursday. vss.  fsbs 152 at triage     Patient currently presents with concern regarding generalized weakness.  Patient notes gradual onset over the past couple of days.  Patient does note she was recently removed from a couple of her diabetes medications but has not been monitoring her glucose.  Patient denies any chest pain shortness of breath.  She denies any fever, chills, diaphoresis.  There has been no vomiting nor diarrhea.  Oral intake has been appropriate.  She denies any urinary symptoms of dysuria, urgency, or frequency.        Review of patient's allergies indicates:   Allergen Reactions    Decadron [dexamethasone] Other (See Comments)     syncope     Past Medical History:   Diagnosis Date    Diabetes mellitus      Past Surgical History:   Procedure Laterality Date     SECTION      CHOLECYSTECTOMY       History reviewed. No pertinent family history.  Social History     Tobacco Use    Smoking status: Former Smoker     Last attempt to quit: 6/10/2014     Years since quittin.6    Smokeless tobacco: Never Used   Substance Use Topics    Alcohol use: No    Drug use: No     Review of Systems   Constitutional: Negative for chills and fever.   HENT: Negative for congestion and rhinorrhea.    Respiratory: Negative for cough, chest tightness, shortness of breath and wheezing.    Cardiovascular: Negative for chest pain, palpitations and leg swelling.   Gastrointestinal: Negative for abdominal pain, constipation, diarrhea, nausea and vomiting.   Genitourinary: Negative for dysuria, frequency, urgency, vaginal bleeding and vaginal discharge.   Skin: Negative for color change and rash.   Allergic/Immunologic: Negative for immunocompromised state.   Neurological: Positive for weakness. Negative for  dizziness, speech difficulty, numbness and headaches.   Hematological: Negative for adenopathy. Does not bruise/bleed easily.   All other systems reviewed and are negative.    Physical Exam     Initial Vitals [02/05/20 1954]   BP Pulse Resp Temp SpO2   125/83 89 18 97.7 °F (36.5 °C) 97 %      MAP       --           Physical Exam    Nursing note and vitals reviewed.  Constitutional: She appears well-developed and well-nourished. She is not diaphoretic. No distress.   HENT:   Head: Normocephalic and atraumatic.   Right Ear: External ear normal.   Left Ear: External ear normal.   Nose: Nose normal.   Mouth/Throat: Oropharynx is clear and moist.   Eyes: Conjunctivae and EOM are normal. Pupils are equal, round, and reactive to light. No scleral icterus.   Neck: Neck supple. No tracheal deviation present. No JVD present.   Cardiovascular: Normal rate, regular rhythm, normal heart sounds and intact distal pulses. Exam reveals no gallop and no friction rub.    No murmur heard.  Pulmonary/Chest: Breath sounds normal. No respiratory distress. She has no wheezes. She has no rhonchi. She has no rales.   Abdominal: Soft. Bowel sounds are normal. She exhibits no distension. There is no tenderness.   Musculoskeletal: Normal range of motion. She exhibits no edema.   Neurological: She is alert and oriented to person, place, and time. She has normal strength. No cranial nerve deficit or sensory deficit. GCS score is 15. GCS eye subscore is 4. GCS verbal subscore is 5. GCS motor subscore is 6.   Skin: Skin is warm and dry. No rash noted.   Psychiatric: She has a normal mood and affect. Her behavior is normal.         ED Course   Procedures  Labs Reviewed   CBC W/ AUTO DIFFERENTIAL - Abnormal; Notable for the following components:       Result Value    MPV 8.6 (*)     Immature Grans (Abs) 0.05 (*)     All other components within normal limits   COMPREHENSIVE METABOLIC PANEL - Abnormal; Notable for the following components:    Glucose  154 (*)     BUN, Bld 18 (*)     Alkaline Phosphatase 155 (*)     Anion Gap 6 (*)     eGFR if non  54.3 (*)     All other components within normal limits   URINALYSIS, REFLEX TO URINE CULTURE - Abnormal; Notable for the following components:    Protein, UA Trace (*)     Leukocytes, UA 1+ (*)     All other components within normal limits    Narrative:     Preferred Collection Type->Urine, Clean Catch   POCT GLUCOSE - Abnormal; Notable for the following components:    POCT Glucose 152 (*)     All other components within normal limits   TROPONIN I   URINALYSIS MICROSCOPIC    Narrative:     Preferred Collection Type->Urine, Clean Catch     EKG Readings: (Independently Interpreted)   Initial Reading: No STEMI. Rhythm: Normal Sinus Rhythm. Heart Rate: 89. Ectopy: No Ectopy. Conduction: Normal.       Imaging Results          X-Ray Chest AP Portable (Final result)  Result time 02/05/20 20:54:56    Final result by Kvng Gibbs MD (02/05/20 20:54:56)                 Impression:      No acute findings.    All CT scans at this facility use dose modulation, iterative reconstruction, and/or weight based dosing when appropriate to reduce radiation dose to as low as reasonably achievable.      Electronically signed by: Kvng Gibbs  Date:    02/05/2020  Time:    20:54             Narrative:    EXAMINATION:  XR CHEST AP PORTABLE    CLINICAL HISTORY:  Weakness, generalized;    COMPARISON:  12/14/2018.    FINDINGS:  Lung fields are clear.  The heart size is normal.  Aortic atherosclerosis.    No pleural fluid or pneumothorax.    No adenopathy is identified.    No significant osseous abnormality.                                 Medical Decision Making:   ED Management:  All findings were reviewed with the patient/family in detail.  I see no indication of an emergent process beyond that addressed during our encounter but have duly counseled the patient/family regarding the need for prompt follow-up as well as the  indications that should prompt immediate return to the emergency room should new or worrisome developments occur.  The patient has additionally been provided with printed information regarding diagnosis as well as instructions regarding follow up and any medications intended to manage the patient's aforementioned conditions.  The patient/family communicates understanding of all this information and all remaining questions and concerns were addressed at this time.                                       Clinical Impression:       ICD-10-CM ICD-9-CM   1. Generalized weakness R53.1 780.79                             Jaylon Taylor MD  02/08/20 0841

## 2020-02-12 ENCOUNTER — HOSPITAL ENCOUNTER (OUTPATIENT)
Dept: RADIOLOGY | Facility: HOSPITAL | Age: 75
Discharge: HOME | End: 2020-02-12
Attending: FAMILY MEDICINE
Payer: MEDICARE

## 2020-02-12 DIAGNOSIS — K30 NONULCER DYSPEPSIA: ICD-10-CM

## 2020-02-12 DIAGNOSIS — R63.4 LOSS OF WEIGHT: ICD-10-CM

## 2020-02-12 DIAGNOSIS — R55 SYNCOPE: ICD-10-CM

## 2020-02-12 PROCEDURE — 93880 EXTRACRANIAL BILAT STUDY: CPT | Mod: TC,PO

## 2020-02-12 PROCEDURE — 76856 US EXAM PELVIC COMPLETE: CPT | Mod: TC,PO

## 2020-02-17 ENCOUNTER — HOSPITAL ENCOUNTER (OUTPATIENT)
Dept: RADIOLOGY | Facility: HOSPITAL | Age: 75
Discharge: HOME | End: 2020-02-17
Attending: FAMILY MEDICINE
Payer: MEDICARE

## 2020-02-17 DIAGNOSIS — R63.4 LOSS OF WEIGHT: ICD-10-CM

## 2020-02-17 DIAGNOSIS — K30 NONULCER DYSPEPSIA: ICD-10-CM

## 2020-02-17 DIAGNOSIS — R55 SYNCOPE: ICD-10-CM

## 2020-02-17 PROCEDURE — 76700 US EXAM ABDOM COMPLETE: CPT | Mod: TC,PO

## 2021-06-04 ENCOUNTER — HOSPITAL ENCOUNTER (EMERGENCY)
Facility: HOSPITAL | Age: 76
Discharge: HOME | End: 2021-06-04
Attending: EMERGENCY MEDICINE
Payer: COMMERCIAL

## 2021-06-04 VITALS
TEMPERATURE: 99 F | HEART RATE: 82 BPM | DIASTOLIC BLOOD PRESSURE: 65 MMHG | OXYGEN SATURATION: 96 % | RESPIRATION RATE: 18 BRPM | HEIGHT: 63 IN | BODY MASS INDEX: 31.99 KG/M2 | SYSTOLIC BLOOD PRESSURE: 132 MMHG | WEIGHT: 180.56 LBS

## 2021-06-04 DIAGNOSIS — M02.30 REACTIVE ARTHRITIS: Primary | ICD-10-CM

## 2021-06-04 LAB
CRP SERPL-MCNC: 0.71 MG/DL (ref 0–1)
ERYTHROCYTE [SEDIMENTATION RATE] IN BLOOD BY WESTERGREN METHOD: 31 MM/HR (ref 0–20)
URATE SERPL-MCNC: 5.3 MG/DL (ref 2.4–5.7)

## 2021-06-04 PROCEDURE — 84550 ASSAY OF BLOOD/URIC ACID: CPT | Performed by: EMERGENCY MEDICINE

## 2021-06-04 PROCEDURE — 99283 EMERGENCY DEPT VISIT LOW MDM: CPT | Mod: ER

## 2021-06-04 PROCEDURE — 85652 RBC SED RATE AUTOMATED: CPT | Performed by: EMERGENCY MEDICINE

## 2021-06-04 PROCEDURE — 86140 C-REACTIVE PROTEIN: CPT | Mod: ER | Performed by: EMERGENCY MEDICINE

## 2021-06-04 RX ORDER — ETODOLAC 400 MG/1
400 TABLET, FILM COATED ORAL 2 TIMES DAILY
Qty: 20 TABLET | Refills: 0 | Status: ON HOLD | OUTPATIENT
Start: 2021-06-04 | End: 2021-06-13 | Stop reason: HOSPADM

## 2021-06-12 ENCOUNTER — HOSPITAL ENCOUNTER (OUTPATIENT)
Facility: HOSPITAL | Age: 76
Discharge: HOME HEALTH | End: 2021-06-13
Attending: EMERGENCY MEDICINE | Admitting: HOSPITALIST
Payer: COMMERCIAL
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
## 2021-06-12 DIAGNOSIS — M79.632 LEFT FOREARM PAIN: ICD-10-CM

## 2021-06-12 DIAGNOSIS — N30.00 ACUTE CYSTITIS WITHOUT HEMATURIA: ICD-10-CM

## 2021-06-12 DIAGNOSIS — M79.622 LEFT UPPER ARM PAIN: ICD-10-CM

## 2021-06-12 DIAGNOSIS — R53.81 DEBILITY: ICD-10-CM

## 2021-06-12 DIAGNOSIS — M25.552 LEFT HIP PAIN: ICD-10-CM

## 2021-06-12 DIAGNOSIS — R42 DIZZINESS: ICD-10-CM

## 2021-06-12 DIAGNOSIS — M25.512 LEFT SHOULDER PAIN: ICD-10-CM

## 2021-06-12 LAB
ALBUMIN SERPL BCP-MCNC: 4 G/DL (ref 3.5–5.2)
ALP SERPL-CCNC: 124 U/L (ref 38–126)
ALT SERPL W/O P-5'-P-CCNC: 14 U/L (ref 10–44)
AMPHET+METHAMPHET UR QL: NEGATIVE
ANION GAP SERPL CALC-SCNC: 5 MMOL/L (ref 8–16)
AST SERPL-CCNC: 28 U/L (ref 15–46)
BARBITURATES UR QL SCN>200 NG/ML: NEGATIVE
BASOPHILS # BLD AUTO: 0.05 K/UL (ref 0–0.2)
BASOPHILS NFR BLD: 0.6 % (ref 0–1.9)
BENZODIAZ UR QL SCN>200 NG/ML: NORMAL
BILIRUB SERPL-MCNC: 0.4 MG/DL (ref 0.1–1)
BILIRUB UR QL STRIP: NEGATIVE
BZE UR QL SCN: NEGATIVE
CALCIUM SERPL-MCNC: 8.8 MG/DL (ref 8.7–10.5)
CANNABINOIDS UR QL SCN: NEGATIVE
CHLORIDE SERPL-SCNC: 108 MMOL/L (ref 95–110)
CLARITY UR REFRACT.AUTO: CLEAR
CO2 SERPL-SCNC: 27 MMOL/L (ref 23–29)
COLOR UR AUTO: YELLOW
CREAT SERPL-MCNC: 0.91 MG/DL (ref 0.5–1.4)
CREAT UR-MCNC: 110.2 MG/DL (ref 15–325)
DIFFERENTIAL METHOD: ABNORMAL
EOSINOPHIL # BLD AUTO: 0.4 K/UL (ref 0–0.5)
EOSINOPHIL NFR BLD: 4.2 % (ref 0–8)
ERYTHROCYTE [DISTWIDTH] IN BLOOD BY AUTOMATED COUNT: 13.3 % (ref 11.5–14.5)
EST. GFR  (AFRICAN AMERICAN): >60 ML/MIN/1.73 M^2
EST. GFR  (NON AFRICAN AMERICAN): >60 ML/MIN/1.73 M^2
GLUCOSE SERPL-MCNC: 103 MG/DL (ref 70–110)
GLUCOSE UR QL STRIP: NEGATIVE
HCT VFR BLD AUTO: 38.3 % (ref 37–48.5)
HGB BLD-MCNC: 12.2 G/DL (ref 12–16)
HGB UR QL STRIP: ABNORMAL
IMM GRANULOCYTES # BLD AUTO: 0.03 K/UL (ref 0–0.04)
IMM GRANULOCYTES NFR BLD AUTO: 0.3 % (ref 0–0.5)
KETONES UR QL STRIP: NEGATIVE
LEUKOCYTE ESTERASE UR QL STRIP: ABNORMAL
LYMPHOCYTES # BLD AUTO: 3 K/UL (ref 1–4.8)
LYMPHOCYTES NFR BLD: 34.1 % (ref 18–48)
MCH RBC QN AUTO: 30.7 PG (ref 27–31)
MCHC RBC AUTO-ENTMCNC: 31.9 G/DL (ref 32–36)
MCV RBC AUTO: 96 FL (ref 82–98)
METHADONE UR QL SCN>300 NG/ML: NEGATIVE
MICROSCOPIC COMMENT: ABNORMAL
MONOCYTES # BLD AUTO: 0.6 K/UL (ref 0.3–1)
MONOCYTES NFR BLD: 6.6 % (ref 4–15)
NEUTROPHILS # BLD AUTO: 4.7 K/UL (ref 1.8–7.7)
NEUTROPHILS NFR BLD: 54.2 % (ref 38–73)
NITRITE UR QL STRIP: NEGATIVE
NRBC BLD-RTO: 0 /100 WBC
OPIATES UR QL SCN: NEGATIVE
PCP UR QL SCN>25 NG/ML: NEGATIVE
PH UR STRIP: 6 [PH] (ref 5–8)
PLATELET # BLD AUTO: 166 K/UL (ref 150–450)
PMV BLD AUTO: 8.9 FL (ref 9.2–12.9)
POTASSIUM SERPL-SCNC: 4.7 MMOL/L (ref 3.5–5.1)
PROT SERPL-MCNC: 7.2 G/DL (ref 6–8.4)
PROT UR QL STRIP: NEGATIVE
RBC # BLD AUTO: 3.98 M/UL (ref 4–5.4)
RBC #/AREA URNS AUTO: 2 /HPF (ref 0–4)
SARS-COV-2 RDRP RESP QL NAA+PROBE: NEGATIVE
SODIUM SERPL-SCNC: 140 MMOL/L (ref 136–145)
SP GR UR STRIP: 1.01 (ref 1–1.03)
TOXICOLOGY INFORMATION: NORMAL
TROPONIN I SERPL-MCNC: <0.012 NG/ML (ref 0.01–0.03)
URN SPEC COLLECT METH UR: ABNORMAL
UROBILINOGEN UR STRIP-ACNC: NEGATIVE EU/DL
UUN UR-MCNC: 14 MG/DL (ref 7–17)
WBC # BLD AUTO: 8.74 K/UL (ref 3.9–12.7)
WBC #/AREA URNS AUTO: 9 /HPF (ref 0–5)

## 2021-06-12 PROCEDURE — 85025 COMPLETE CBC W/AUTO DIFF WBC: CPT | Mod: ER | Performed by: PHYSICIAN ASSISTANT

## 2021-06-12 PROCEDURE — 80053 COMPREHEN METABOLIC PANEL: CPT | Mod: ER | Performed by: PHYSICIAN ASSISTANT

## 2021-06-12 PROCEDURE — 87086 URINE CULTURE/COLONY COUNT: CPT | Mod: ER | Performed by: PHYSICIAN ASSISTANT

## 2021-06-12 PROCEDURE — 93010 ELECTROCARDIOGRAM REPORT: CPT | Mod: ,,, | Performed by: INTERNAL MEDICINE

## 2021-06-12 PROCEDURE — A4216 STERILE WATER/SALINE, 10 ML: HCPCS | Mod: ER | Performed by: HOSPITALIST

## 2021-06-12 PROCEDURE — 80307 DRUG TEST PRSMV CHEM ANLYZR: CPT | Mod: ER | Performed by: PHYSICIAN ASSISTANT

## 2021-06-12 PROCEDURE — 93005 ELECTROCARDIOGRAM TRACING: CPT | Mod: ER

## 2021-06-12 PROCEDURE — 25000003 PHARM REV CODE 250: Mod: ER | Performed by: HOSPITALIST

## 2021-06-12 PROCEDURE — U0002 COVID-19 LAB TEST NON-CDC: HCPCS | Mod: ER | Performed by: PHYSICIAN ASSISTANT

## 2021-06-12 PROCEDURE — 81000 URINALYSIS NONAUTO W/SCOPE: CPT | Mod: 59,ER | Performed by: PHYSICIAN ASSISTANT

## 2021-06-12 PROCEDURE — 93010 EKG 12-LEAD: ICD-10-PCS | Mod: ,,, | Performed by: INTERNAL MEDICINE

## 2021-06-12 PROCEDURE — 96372 THER/PROPH/DIAG INJ SC/IM: CPT | Mod: ER

## 2021-06-12 PROCEDURE — 99285 EMERGENCY DEPT VISIT HI MDM: CPT | Mod: 25,ER

## 2021-06-12 PROCEDURE — 25000003 PHARM REV CODE 250: Mod: ER | Performed by: PHYSICIAN ASSISTANT

## 2021-06-12 PROCEDURE — 63600175 PHARM REV CODE 636 W HCPCS: Mod: ER | Performed by: HOSPITALIST

## 2021-06-12 PROCEDURE — 84484 ASSAY OF TROPONIN QUANT: CPT | Mod: ER | Performed by: PHYSICIAN ASSISTANT

## 2021-06-12 RX ORDER — CEPHALEXIN 500 MG/1
500 CAPSULE ORAL
Status: COMPLETED | OUTPATIENT
Start: 2021-06-12 | End: 2021-06-12

## 2021-06-12 RX ORDER — IBUPROFEN 200 MG
24 TABLET ORAL
Status: DISCONTINUED | OUTPATIENT
Start: 2021-06-12 | End: 2021-06-13 | Stop reason: HOSPADM

## 2021-06-12 RX ORDER — IBUPROFEN 200 MG
16 TABLET ORAL
Status: DISCONTINUED | OUTPATIENT
Start: 2021-06-12 | End: 2021-06-13 | Stop reason: HOSPADM

## 2021-06-12 RX ORDER — GLUCAGON 1 MG
1 KIT INJECTION
Status: DISCONTINUED | OUTPATIENT
Start: 2021-06-12 | End: 2021-06-13 | Stop reason: HOSPADM

## 2021-06-12 RX ORDER — ACETAMINOPHEN 500 MG
1000 TABLET ORAL EVERY 8 HOURS PRN
Status: DISCONTINUED | OUTPATIENT
Start: 2021-06-12 | End: 2021-06-13 | Stop reason: HOSPADM

## 2021-06-12 RX ORDER — ALPRAZOLAM 0.5 MG/1
0.5 TABLET ORAL 3 TIMES DAILY
Status: ON HOLD | COMMUNITY
End: 2021-06-13 | Stop reason: HOSPADM

## 2021-06-12 RX ORDER — ACETAMINOPHEN 500 MG
1000 TABLET ORAL
Status: COMPLETED | OUTPATIENT
Start: 2021-06-12 | End: 2021-06-12

## 2021-06-12 RX ORDER — ENOXAPARIN SODIUM 100 MG/ML
40 INJECTION SUBCUTANEOUS EVERY 24 HOURS
Status: DISCONTINUED | OUTPATIENT
Start: 2021-06-12 | End: 2021-06-13 | Stop reason: HOSPADM

## 2021-06-12 RX ORDER — ACETAMINOPHEN 325 MG/1
650 TABLET ORAL EVERY 4 HOURS PRN
Status: DISCONTINUED | OUTPATIENT
Start: 2021-06-12 | End: 2021-06-13 | Stop reason: HOSPADM

## 2021-06-12 RX ORDER — SODIUM CHLORIDE 0.9 % (FLUSH) 0.9 %
10 SYRINGE (ML) INJECTION EVERY 8 HOURS
Status: DISCONTINUED | OUTPATIENT
Start: 2021-06-12 | End: 2021-06-13 | Stop reason: HOSPADM

## 2021-06-12 RX ORDER — ONDANSETRON 8 MG/1
8 TABLET, ORALLY DISINTEGRATING ORAL EVERY 8 HOURS PRN
Status: DISCONTINUED | OUTPATIENT
Start: 2021-06-12 | End: 2021-06-13 | Stop reason: HOSPADM

## 2021-06-12 RX ORDER — POLYETHYLENE GLYCOL 3350 17 G/17G
17 POWDER, FOR SOLUTION ORAL DAILY
Status: DISCONTINUED | OUTPATIENT
Start: 2021-06-13 | End: 2021-06-13 | Stop reason: HOSPADM

## 2021-06-12 RX ORDER — TALC
6 POWDER (GRAM) TOPICAL NIGHTLY PRN
Status: DISCONTINUED | OUTPATIENT
Start: 2021-06-12 | End: 2021-06-13 | Stop reason: HOSPADM

## 2021-06-12 RX ADMIN — ENOXAPARIN SODIUM 40 MG: 60 INJECTION SUBCUTANEOUS at 09:06

## 2021-06-12 RX ADMIN — CEPHALEXIN 500 MG: 500 CAPSULE ORAL at 09:06

## 2021-06-12 RX ADMIN — Medication 10 ML: at 09:06

## 2021-06-12 RX ADMIN — ACETAMINOPHEN 1000 MG: 500 TABLET ORAL at 03:06

## 2021-06-13 VITALS
RESPIRATION RATE: 17 BRPM | BODY MASS INDEX: 29.38 KG/M2 | WEIGHT: 176.38 LBS | HEIGHT: 65 IN | HEART RATE: 79 BPM | SYSTOLIC BLOOD PRESSURE: 140 MMHG | TEMPERATURE: 98 F | OXYGEN SATURATION: 97 % | DIASTOLIC BLOOD PRESSURE: 62 MMHG

## 2021-06-13 PROBLEM — N30.90 CYSTITIS: Status: ACTIVE | Noted: 2021-06-13

## 2021-06-13 PROBLEM — F41.9 ANXIETY: Chronic | Status: ACTIVE | Noted: 2021-06-13

## 2021-06-13 PROBLEM — E11.9 TYPE 2 DIABETES MELLITUS WITHOUT COMPLICATION: Chronic | Status: ACTIVE | Noted: 2021-06-13

## 2021-06-13 PROBLEM — R53.81 DEBILITY: Status: ACTIVE | Noted: 2021-06-13

## 2021-06-13 PROBLEM — R53.1 WEAKNESS: Chronic | Status: ACTIVE | Noted: 2021-06-13

## 2021-06-13 LAB
ANION GAP SERPL CALC-SCNC: 9 MMOL/L (ref 8–16)
BASOPHILS # BLD AUTO: 0.04 K/UL (ref 0–0.2)
BASOPHILS NFR BLD: 0.4 % (ref 0–1.9)
BUN SERPL-MCNC: 13 MG/DL (ref 8–23)
CALCIUM SERPL-MCNC: 8.6 MG/DL (ref 8.7–10.5)
CHLORIDE SERPL-SCNC: 112 MMOL/L (ref 95–110)
CO2 SERPL-SCNC: 22 MMOL/L (ref 23–29)
CREAT SERPL-MCNC: 0.9 MG/DL (ref 0.5–1.4)
DIFFERENTIAL METHOD: ABNORMAL
EOSINOPHIL # BLD AUTO: 0.4 K/UL (ref 0–0.5)
EOSINOPHIL NFR BLD: 4 % (ref 0–8)
ERYTHROCYTE [DISTWIDTH] IN BLOOD BY AUTOMATED COUNT: 13.4 % (ref 11.5–14.5)
EST. GFR  (AFRICAN AMERICAN): >60 ML/MIN/1.73 M^2
EST. GFR  (NON AFRICAN AMERICAN): >60 ML/MIN/1.73 M^2
GLUCOSE SERPL-MCNC: 114 MG/DL (ref 70–110)
HCT VFR BLD AUTO: 39.3 % (ref 37–48.5)
HGB BLD-MCNC: 12.1 G/DL (ref 12–16)
IMM GRANULOCYTES # BLD AUTO: 0.04 K/UL (ref 0–0.04)
IMM GRANULOCYTES NFR BLD AUTO: 0.4 % (ref 0–0.5)
LYMPHOCYTES # BLD AUTO: 3.6 K/UL (ref 1–4.8)
LYMPHOCYTES NFR BLD: 38.3 % (ref 18–48)
MAGNESIUM SERPL-MCNC: 2.1 MG/DL (ref 1.6–2.6)
MCH RBC QN AUTO: 29.6 PG (ref 27–31)
MCHC RBC AUTO-ENTMCNC: 30.8 G/DL (ref 32–36)
MCV RBC AUTO: 96 FL (ref 82–98)
MONOCYTES # BLD AUTO: 0.6 K/UL (ref 0.3–1)
MONOCYTES NFR BLD: 6.9 % (ref 4–15)
NEUTROPHILS # BLD AUTO: 4.6 K/UL (ref 1.8–7.7)
NEUTROPHILS NFR BLD: 50 % (ref 38–73)
NRBC BLD-RTO: 0 /100 WBC
PLATELET # BLD AUTO: 243 K/UL (ref 150–450)
PMV BLD AUTO: 8.9 FL (ref 9.2–12.9)
POCT GLUCOSE: 128 MG/DL (ref 70–110)
POCT GLUCOSE: 145 MG/DL (ref 70–110)
POTASSIUM SERPL-SCNC: 3.9 MMOL/L (ref 3.5–5.1)
RBC # BLD AUTO: 4.09 M/UL (ref 4–5.4)
SODIUM SERPL-SCNC: 143 MMOL/L (ref 136–145)
WBC # BLD AUTO: 9.27 K/UL (ref 3.9–12.7)

## 2021-06-13 PROCEDURE — 83735 ASSAY OF MAGNESIUM: CPT | Performed by: HOSPITALIST

## 2021-06-13 PROCEDURE — 80048 BASIC METABOLIC PNL TOTAL CA: CPT | Performed by: HOSPITALIST

## 2021-06-13 PROCEDURE — 25000003 PHARM REV CODE 250: Performed by: HOSPITALIST

## 2021-06-13 PROCEDURE — 25000003 PHARM REV CODE 250: Performed by: NURSE PRACTITIONER

## 2021-06-13 PROCEDURE — 97165 OT EVAL LOW COMPLEX 30 MIN: CPT

## 2021-06-13 PROCEDURE — 63600175 PHARM REV CODE 636 W HCPCS: Performed by: HOSPITALIST

## 2021-06-13 PROCEDURE — 97116 GAIT TRAINING THERAPY: CPT

## 2021-06-13 PROCEDURE — A4216 STERILE WATER/SALINE, 10 ML: HCPCS | Performed by: HOSPITALIST

## 2021-06-13 PROCEDURE — 96374 THER/PROPH/DIAG INJ IV PUSH: CPT

## 2021-06-13 PROCEDURE — G0378 HOSPITAL OBSERVATION PER HR: HCPCS

## 2021-06-13 PROCEDURE — 94761 N-INVAS EAR/PLS OXIMETRY MLT: CPT

## 2021-06-13 PROCEDURE — 36415 COLL VENOUS BLD VENIPUNCTURE: CPT | Performed by: HOSPITALIST

## 2021-06-13 PROCEDURE — 97535 SELF CARE MNGMENT TRAINING: CPT

## 2021-06-13 PROCEDURE — 97161 PT EVAL LOW COMPLEX 20 MIN: CPT

## 2021-06-13 PROCEDURE — 85025 COMPLETE CBC W/AUTO DIFF WBC: CPT | Performed by: HOSPITALIST

## 2021-06-13 RX ORDER — HYDROXYZINE PAMOATE 25 MG/1
25 CAPSULE ORAL EVERY 8 HOURS PRN
Status: DISCONTINUED | OUTPATIENT
Start: 2021-06-13 | End: 2021-06-13

## 2021-06-13 RX ORDER — FUROSEMIDE 20 MG/1
20 TABLET ORAL 2 TIMES DAILY
COMMUNITY
Start: 2020-12-23 | End: 2022-02-07 | Stop reason: SDUPTHER

## 2021-06-13 RX ORDER — ELECTROLYTES/DEXTROSE
SOLUTION, ORAL ORAL 2 TIMES DAILY
Status: DISCONTINUED | OUTPATIENT
Start: 2021-06-13 | End: 2021-06-13 | Stop reason: HOSPADM

## 2021-06-13 RX ORDER — CLOBETASOL PROPIONATE 0.5 MG/G
CREAM TOPICAL 2 TIMES DAILY
COMMUNITY
End: 2022-02-04 | Stop reason: ALTCHOICE

## 2021-06-13 RX ORDER — CEPHALEXIN 500 MG/1
500 CAPSULE ORAL EVERY 12 HOURS
Qty: 10 CAPSULE | Refills: 0 | Status: SHIPPED | OUTPATIENT
Start: 2021-06-13 | End: 2021-06-18

## 2021-06-13 RX ORDER — HYDROXYZINE PAMOATE 25 MG/1
25 CAPSULE ORAL EVERY 6 HOURS PRN
Status: DISCONTINUED | OUTPATIENT
Start: 2021-06-13 | End: 2021-06-13 | Stop reason: HOSPADM

## 2021-06-13 RX ORDER — HYDROCORTISONE 1 %
CREAM (GRAM) TOPICAL 2 TIMES DAILY
Refills: 0 | Status: DISCONTINUED | OUTPATIENT
Start: 2021-06-13 | End: 2021-06-13

## 2021-06-13 RX ORDER — FUROSEMIDE 20 MG/1
20 TABLET ORAL 2 TIMES DAILY
Status: DISCONTINUED | OUTPATIENT
Start: 2021-06-13 | End: 2021-06-13 | Stop reason: HOSPADM

## 2021-06-13 RX ORDER — CEPHALEXIN 500 MG/1
500 CAPSULE ORAL EVERY 12 HOURS
Status: DISCONTINUED | OUTPATIENT
Start: 2021-06-13 | End: 2021-06-13 | Stop reason: HOSPADM

## 2021-06-13 RX ADMIN — HYDROXYZINE PAMOATE 25 MG: 25 CAPSULE ORAL at 12:06

## 2021-06-13 RX ADMIN — HYDROXYZINE PAMOATE 25 MG: 25 CAPSULE ORAL at 04:06

## 2021-06-13 RX ADMIN — Medication 10 ML: at 05:06

## 2021-06-13 RX ADMIN — HYDROCORTISONE: 0.01 CREAM TOPICAL at 12:06

## 2021-06-13 RX ADMIN — CEFTRIAXONE 1 G: 1 INJECTION, SOLUTION INTRAVENOUS at 01:06

## 2021-06-13 RX ADMIN — CEPHALEXIN 500 MG: 500 CAPSULE ORAL at 08:06

## 2021-06-13 RX ADMIN — FUROSEMIDE 20 MG: 20 TABLET ORAL at 08:06

## 2021-06-14 LAB
BACTERIA UR CULT: NORMAL
BACTERIA UR CULT: NORMAL

## 2021-06-15 PROCEDURE — G0180 PR HOME HEALTH MD CERTIFICATION: ICD-10-PCS | Mod: ,,, | Performed by: HOSPITALIST

## 2021-06-15 PROCEDURE — G0180 MD CERTIFICATION HHA PATIENT: HCPCS | Mod: ,,, | Performed by: HOSPITALIST

## 2021-06-24 ENCOUNTER — EXTERNAL HOME HEALTH (OUTPATIENT)
Dept: HOME HEALTH SERVICES | Facility: HOSPITAL | Age: 76
End: 2021-06-24
Payer: COMMERCIAL

## 2021-09-15 ENCOUNTER — OFFICE VISIT (OUTPATIENT)
Dept: URGENT CARE | Facility: CLINIC | Age: 76
End: 2021-09-15
Payer: MEDICARE

## 2021-09-15 VITALS
SYSTOLIC BLOOD PRESSURE: 140 MMHG | OXYGEN SATURATION: 97 % | TEMPERATURE: 99 F | WEIGHT: 180 LBS | DIASTOLIC BLOOD PRESSURE: 80 MMHG | BODY MASS INDEX: 29.99 KG/M2 | RESPIRATION RATE: 16 BRPM | HEART RATE: 92 BPM | HEIGHT: 65 IN

## 2021-09-15 DIAGNOSIS — N75.1 ABSCESS OF LEFT BARTHOLIN'S GLAND: ICD-10-CM

## 2021-09-15 DIAGNOSIS — L02.91 ABSCESS: Primary | ICD-10-CM

## 2021-09-15 DIAGNOSIS — E11.9 TYPE 2 DIABETES MELLITUS WITHOUT COMPLICATION, WITHOUT LONG-TERM CURRENT USE OF INSULIN: ICD-10-CM

## 2021-09-15 LAB — GLUCOSE SERPL-MCNC: 112 MG/DL (ref 70–110)

## 2021-09-15 PROCEDURE — 90471 IMMUNIZATION ADMIN: CPT | Mod: S$GLB,,, | Performed by: EMERGENCY MEDICINE

## 2021-09-15 PROCEDURE — 99214 OFFICE O/P EST MOD 30 MIN: CPT | Mod: 25,S$GLB,, | Performed by: EMERGENCY MEDICINE

## 2021-09-15 PROCEDURE — 1160F RVW MEDS BY RX/DR IN RCRD: CPT | Mod: CPTII,S$GLB,, | Performed by: EMERGENCY MEDICINE

## 2021-09-15 PROCEDURE — 1159F PR MEDICATION LIST DOCUMENTED IN MEDICAL RECORD: ICD-10-PCS | Mod: CPTII,S$GLB,, | Performed by: EMERGENCY MEDICINE

## 2021-09-15 PROCEDURE — 1125F PR PAIN SEVERITY QUANTIFIED, PAIN PRESENT: ICD-10-PCS | Mod: CPTII,S$GLB,, | Performed by: EMERGENCY MEDICINE

## 2021-09-15 PROCEDURE — 1125F AMNT PAIN NOTED PAIN PRSNT: CPT | Mod: CPTII,S$GLB,, | Performed by: EMERGENCY MEDICINE

## 2021-09-15 PROCEDURE — 82962 GLUCOSE BLOOD TEST: CPT | Mod: S$GLB,,, | Performed by: EMERGENCY MEDICINE

## 2021-09-15 PROCEDURE — 10060 I&D ABSCESS SIMPLE/SINGLE: CPT | Mod: S$GLB,,, | Performed by: EMERGENCY MEDICINE

## 2021-09-15 PROCEDURE — 10060 INCISION & DRAINAGE: ICD-10-PCS | Mod: S$GLB,,, | Performed by: EMERGENCY MEDICINE

## 2021-09-15 PROCEDURE — 3077F SYST BP >= 140 MM HG: CPT | Mod: CPTII,S$GLB,, | Performed by: EMERGENCY MEDICINE

## 2021-09-15 PROCEDURE — 99214 PR OFFICE/OUTPT VISIT, EST, LEVL IV, 30-39 MIN: ICD-10-PCS | Mod: 25,S$GLB,, | Performed by: EMERGENCY MEDICINE

## 2021-09-15 PROCEDURE — 3077F PR MOST RECENT SYSTOLIC BLOOD PRESSURE >= 140 MM HG: ICD-10-PCS | Mod: CPTII,S$GLB,, | Performed by: EMERGENCY MEDICINE

## 2021-09-15 PROCEDURE — 3079F PR MOST RECENT DIASTOLIC BLOOD PRESSURE 80-89 MM HG: ICD-10-PCS | Mod: CPTII,S$GLB,, | Performed by: EMERGENCY MEDICINE

## 2021-09-15 PROCEDURE — 1160F PR REVIEW ALL MEDS BY PRESCRIBER/CLIN PHARMACIST DOCUMENTED: ICD-10-PCS | Mod: CPTII,S$GLB,, | Performed by: EMERGENCY MEDICINE

## 2021-09-15 PROCEDURE — 1159F MED LIST DOCD IN RCRD: CPT | Mod: CPTII,S$GLB,, | Performed by: EMERGENCY MEDICINE

## 2021-09-15 PROCEDURE — 82962 POCT GLUCOSE, HAND-HELD DEVICE: ICD-10-PCS | Mod: S$GLB,,, | Performed by: EMERGENCY MEDICINE

## 2021-09-15 PROCEDURE — 90471 TDAP VACCINE GREATER THAN OR EQUAL TO 7YO IM: ICD-10-PCS | Mod: S$GLB,,, | Performed by: EMERGENCY MEDICINE

## 2021-09-15 PROCEDURE — 3079F DIAST BP 80-89 MM HG: CPT | Mod: CPTII,S$GLB,, | Performed by: EMERGENCY MEDICINE

## 2021-09-15 RX ORDER — SULFAMETHOXAZOLE AND TRIMETHOPRIM 800; 160 MG/1; MG/1
1 TABLET ORAL 2 TIMES DAILY
Qty: 14 TABLET | Refills: 0 | Status: SHIPPED | OUTPATIENT
Start: 2021-09-15 | End: 2021-09-22

## 2021-09-15 RX ORDER — LIDOCAINE HYDROCHLORIDE 10 MG/ML
2 INJECTION INFILTRATION; PERINEURAL
Status: COMPLETED | OUTPATIENT
Start: 2021-09-15 | End: 2021-09-15

## 2021-09-15 RX ORDER — MUPIROCIN 20 MG/G
OINTMENT TOPICAL 2 TIMES DAILY
Qty: 15 G | Refills: 0 | Status: SHIPPED | OUTPATIENT
Start: 2021-09-15 | End: 2021-09-20

## 2021-09-15 RX ORDER — HYDROCODONE BITARTRATE AND ACETAMINOPHEN 5; 325 MG/1; MG/1
1 TABLET ORAL EVERY 6 HOURS PRN
Qty: 15 TABLET | Refills: 0 | Status: SHIPPED | OUTPATIENT
Start: 2021-09-15 | End: 2022-08-05

## 2021-09-15 RX ADMIN — LIDOCAINE HYDROCHLORIDE 2 ML: 10 INJECTION INFILTRATION; PERINEURAL at 07:09

## 2021-09-21 ENCOUNTER — TELEPHONE (OUTPATIENT)
Dept: URGENT CARE | Facility: CLINIC | Age: 76
End: 2021-09-21

## 2021-09-23 ENCOUNTER — OFFICE VISIT (OUTPATIENT)
Dept: DERMATOLOGY | Facility: CLINIC | Age: 76
End: 2021-09-23
Payer: MEDICARE

## 2021-09-23 DIAGNOSIS — L73.1 INGROWN HAIR: Primary | ICD-10-CM

## 2021-09-23 PROCEDURE — 99999 PR PBB SHADOW E&M-EST. PATIENT-LVL II: CPT | Mod: PBBFAC,,, | Performed by: STUDENT IN AN ORGANIZED HEALTH CARE EDUCATION/TRAINING PROGRAM

## 2021-09-23 PROCEDURE — 1160F PR REVIEW ALL MEDS BY PRESCRIBER/CLIN PHARMACIST DOCUMENTED: ICD-10-PCS | Mod: CPTII,S$GLB,, | Performed by: STUDENT IN AN ORGANIZED HEALTH CARE EDUCATION/TRAINING PROGRAM

## 2021-09-23 PROCEDURE — 1159F PR MEDICATION LIST DOCUMENTED IN MEDICAL RECORD: ICD-10-PCS | Mod: CPTII,S$GLB,, | Performed by: STUDENT IN AN ORGANIZED HEALTH CARE EDUCATION/TRAINING PROGRAM

## 2021-09-23 PROCEDURE — 99999 PR PBB SHADOW E&M-EST. PATIENT-LVL II: ICD-10-PCS | Mod: PBBFAC,,, | Performed by: STUDENT IN AN ORGANIZED HEALTH CARE EDUCATION/TRAINING PROGRAM

## 2021-09-23 PROCEDURE — 1160F RVW MEDS BY RX/DR IN RCRD: CPT | Mod: CPTII,S$GLB,, | Performed by: STUDENT IN AN ORGANIZED HEALTH CARE EDUCATION/TRAINING PROGRAM

## 2021-09-23 PROCEDURE — 99203 PR OFFICE/OUTPT VISIT, NEW, LEVL III, 30-44 MIN: ICD-10-PCS | Mod: S$GLB,,, | Performed by: STUDENT IN AN ORGANIZED HEALTH CARE EDUCATION/TRAINING PROGRAM

## 2021-09-23 PROCEDURE — 99203 OFFICE O/P NEW LOW 30 MIN: CPT | Mod: S$GLB,,, | Performed by: STUDENT IN AN ORGANIZED HEALTH CARE EDUCATION/TRAINING PROGRAM

## 2021-09-23 PROCEDURE — 1159F MED LIST DOCD IN RCRD: CPT | Mod: CPTII,S$GLB,, | Performed by: STUDENT IN AN ORGANIZED HEALTH CARE EDUCATION/TRAINING PROGRAM

## 2021-09-23 RX ORDER — DOXYCYCLINE HYCLATE 100 MG
100 TABLET ORAL EVERY 12 HOURS
Qty: 28 TABLET | Refills: 0 | Status: SHIPPED | OUTPATIENT
Start: 2021-09-23 | End: 2021-10-07

## 2021-10-13 ENCOUNTER — OFFICE VISIT (OUTPATIENT)
Dept: URGENT CARE | Facility: CLINIC | Age: 76
End: 2021-10-13
Payer: MEDICARE

## 2021-10-13 VITALS
SYSTOLIC BLOOD PRESSURE: 130 MMHG | TEMPERATURE: 98 F | WEIGHT: 182 LBS | OXYGEN SATURATION: 100 % | DIASTOLIC BLOOD PRESSURE: 58 MMHG | HEIGHT: 65 IN | BODY MASS INDEX: 30.32 KG/M2 | RESPIRATION RATE: 20 BRPM | HEART RATE: 87 BPM

## 2021-10-13 DIAGNOSIS — M25.512 ACUTE PAIN OF LEFT SHOULDER: ICD-10-CM

## 2021-10-13 DIAGNOSIS — Z63.8 POOR ATTACHMENT TO PRIMARY CAREGIVER: ICD-10-CM

## 2021-10-13 DIAGNOSIS — R53.1 WEAKNESS: ICD-10-CM

## 2021-10-13 DIAGNOSIS — R53.83 FATIGUE, UNSPECIFIED TYPE: Primary | ICD-10-CM

## 2021-10-13 LAB — GLUCOSE SERPL-MCNC: 110 MG/DL (ref 70–110)

## 2021-10-13 PROCEDURE — 3075F PR MOST RECENT SYSTOLIC BLOOD PRESS GE 130-139MM HG: ICD-10-PCS | Mod: CPTII,S$GLB,, | Performed by: NURSE PRACTITIONER

## 2021-10-13 PROCEDURE — 1160F RVW MEDS BY RX/DR IN RCRD: CPT | Mod: CPTII,S$GLB,, | Performed by: NURSE PRACTITIONER

## 2021-10-13 PROCEDURE — 1159F PR MEDICATION LIST DOCUMENTED IN MEDICAL RECORD: ICD-10-PCS | Mod: CPTII,S$GLB,, | Performed by: NURSE PRACTITIONER

## 2021-10-13 PROCEDURE — 82962 POCT GLUCOSE, HAND-HELD DEVICE: ICD-10-PCS | Mod: S$GLB,,, | Performed by: NURSE PRACTITIONER

## 2021-10-13 PROCEDURE — 99214 OFFICE O/P EST MOD 30 MIN: CPT | Mod: S$GLB,,, | Performed by: NURSE PRACTITIONER

## 2021-10-13 PROCEDURE — 1126F AMNT PAIN NOTED NONE PRSNT: CPT | Mod: CPTII,S$GLB,, | Performed by: NURSE PRACTITIONER

## 2021-10-13 PROCEDURE — 3078F DIAST BP <80 MM HG: CPT | Mod: CPTII,S$GLB,, | Performed by: NURSE PRACTITIONER

## 2021-10-13 PROCEDURE — 1160F PR REVIEW ALL MEDS BY PRESCRIBER/CLIN PHARMACIST DOCUMENTED: ICD-10-PCS | Mod: CPTII,S$GLB,, | Performed by: NURSE PRACTITIONER

## 2021-10-13 PROCEDURE — 3078F PR MOST RECENT DIASTOLIC BLOOD PRESSURE < 80 MM HG: ICD-10-PCS | Mod: CPTII,S$GLB,, | Performed by: NURSE PRACTITIONER

## 2021-10-13 PROCEDURE — 3075F SYST BP GE 130 - 139MM HG: CPT | Mod: CPTII,S$GLB,, | Performed by: NURSE PRACTITIONER

## 2021-10-13 PROCEDURE — 82962 GLUCOSE BLOOD TEST: CPT | Mod: S$GLB,,, | Performed by: NURSE PRACTITIONER

## 2021-10-13 PROCEDURE — 1126F PR PAIN SEVERITY QUANTIFIED, NO PAIN PRESENT: ICD-10-PCS | Mod: CPTII,S$GLB,, | Performed by: NURSE PRACTITIONER

## 2021-10-13 PROCEDURE — 1159F MED LIST DOCD IN RCRD: CPT | Mod: CPTII,S$GLB,, | Performed by: NURSE PRACTITIONER

## 2021-10-13 PROCEDURE — 99214 PR OFFICE/OUTPT VISIT, EST, LEVL IV, 30-39 MIN: ICD-10-PCS | Mod: S$GLB,,, | Performed by: NURSE PRACTITIONER

## 2021-10-13 SDOH — SOCIAL DETERMINANTS OF HEALTH (SDOH): OTHER SPECIFIED PROBLEMS RELATED TO PRIMARY SUPPORT GROUP: Z63.8

## 2021-10-16 ENCOUNTER — TELEPHONE (OUTPATIENT)
Dept: URGENT CARE | Facility: CLINIC | Age: 76
End: 2021-10-16

## 2021-10-17 ENCOUNTER — OFFICE VISIT (OUTPATIENT)
Dept: URGENT CARE | Facility: CLINIC | Age: 76
End: 2021-10-17
Payer: MEDICARE

## 2021-10-17 VITALS
HEIGHT: 64 IN | OXYGEN SATURATION: 95 % | HEART RATE: 98 BPM | TEMPERATURE: 98 F | DIASTOLIC BLOOD PRESSURE: 67 MMHG | WEIGHT: 180 LBS | BODY MASS INDEX: 30.73 KG/M2 | SYSTOLIC BLOOD PRESSURE: 115 MMHG

## 2021-10-17 DIAGNOSIS — M25.561 ACUTE PAIN OF RIGHT KNEE: Primary | ICD-10-CM

## 2021-10-17 DIAGNOSIS — R53.83 FATIGUE, UNSPECIFIED TYPE: ICD-10-CM

## 2021-10-17 PROCEDURE — 99213 PR OFFICE/OUTPT VISIT, EST, LEVL III, 20-29 MIN: ICD-10-PCS | Mod: S$GLB,,, | Performed by: PHYSICIAN ASSISTANT

## 2021-10-17 PROCEDURE — 1160F PR REVIEW ALL MEDS BY PRESCRIBER/CLIN PHARMACIST DOCUMENTED: ICD-10-PCS | Mod: CPTII,S$GLB,, | Performed by: PHYSICIAN ASSISTANT

## 2021-10-17 PROCEDURE — 3078F DIAST BP <80 MM HG: CPT | Mod: CPTII,S$GLB,, | Performed by: PHYSICIAN ASSISTANT

## 2021-10-17 PROCEDURE — 1159F MED LIST DOCD IN RCRD: CPT | Mod: CPTII,S$GLB,, | Performed by: PHYSICIAN ASSISTANT

## 2021-10-17 PROCEDURE — 99213 OFFICE O/P EST LOW 20 MIN: CPT | Mod: S$GLB,,, | Performed by: PHYSICIAN ASSISTANT

## 2021-10-17 PROCEDURE — 3078F PR MOST RECENT DIASTOLIC BLOOD PRESSURE < 80 MM HG: ICD-10-PCS | Mod: CPTII,S$GLB,, | Performed by: PHYSICIAN ASSISTANT

## 2021-10-17 PROCEDURE — 1159F PR MEDICATION LIST DOCUMENTED IN MEDICAL RECORD: ICD-10-PCS | Mod: CPTII,S$GLB,, | Performed by: PHYSICIAN ASSISTANT

## 2021-10-17 PROCEDURE — 3074F PR MOST RECENT SYSTOLIC BLOOD PRESSURE < 130 MM HG: ICD-10-PCS | Mod: CPTII,S$GLB,, | Performed by: PHYSICIAN ASSISTANT

## 2021-10-17 PROCEDURE — 1160F RVW MEDS BY RX/DR IN RCRD: CPT | Mod: CPTII,S$GLB,, | Performed by: PHYSICIAN ASSISTANT

## 2021-10-17 PROCEDURE — 3074F SYST BP LT 130 MM HG: CPT | Mod: CPTII,S$GLB,, | Performed by: PHYSICIAN ASSISTANT

## 2021-10-20 ENCOUNTER — TELEPHONE (OUTPATIENT)
Dept: URGENT CARE | Facility: CLINIC | Age: 76
End: 2021-10-20

## 2022-01-12 ENCOUNTER — IMMUNIZATION (OUTPATIENT)
Dept: INTERNAL MEDICINE | Facility: CLINIC | Age: 77
End: 2022-01-12
Payer: MEDICARE

## 2022-01-12 ENCOUNTER — OFFICE VISIT (OUTPATIENT)
Dept: PODIATRY | Facility: CLINIC | Age: 77
End: 2022-01-12
Payer: MEDICARE

## 2022-01-12 VITALS — HEIGHT: 64 IN | BODY MASS INDEX: 30.73 KG/M2 | WEIGHT: 180 LBS

## 2022-01-12 DIAGNOSIS — L60.8 LONGITUDINAL MELANONYCHIA: ICD-10-CM

## 2022-01-12 DIAGNOSIS — B35.1 DERMATOPHYTOSIS OF NAIL: Primary | ICD-10-CM

## 2022-01-12 PROCEDURE — G0008 FLU VACCINE - QUADRIVALENT - ADJUVANTED: ICD-10-PCS | Mod: S$GLB,,, | Performed by: FAMILY MEDICINE

## 2022-01-12 PROCEDURE — 99204 PR OFFICE/OUTPT VISIT, NEW, LEVL IV, 45-59 MIN: ICD-10-PCS | Mod: S$GLB,,, | Performed by: PODIATRIST

## 2022-01-12 PROCEDURE — 1126F PR PAIN SEVERITY QUANTIFIED, NO PAIN PRESENT: ICD-10-PCS | Mod: CPTII,S$GLB,, | Performed by: PODIATRIST

## 2022-01-12 PROCEDURE — 3288F PR FALLS RISK ASSESSMENT DOCUMENTED: ICD-10-PCS | Mod: CPTII,S$GLB,, | Performed by: PODIATRIST

## 2022-01-12 PROCEDURE — 1159F MED LIST DOCD IN RCRD: CPT | Mod: CPTII,S$GLB,, | Performed by: PODIATRIST

## 2022-01-12 PROCEDURE — 99204 OFFICE O/P NEW MOD 45 MIN: CPT | Mod: S$GLB,,, | Performed by: PODIATRIST

## 2022-01-12 PROCEDURE — 99999 PR PBB SHADOW E&M-EST. PATIENT-LVL II: CPT | Mod: PBBFAC,,, | Performed by: PODIATRIST

## 2022-01-12 PROCEDURE — 99999 PR PBB SHADOW E&M-EST. PATIENT-LVL II: ICD-10-PCS | Mod: PBBFAC,,, | Performed by: PODIATRIST

## 2022-01-12 PROCEDURE — G0008 ADMIN INFLUENZA VIRUS VAC: HCPCS | Mod: S$GLB,,, | Performed by: FAMILY MEDICINE

## 2022-01-12 PROCEDURE — 1126F AMNT PAIN NOTED NONE PRSNT: CPT | Mod: CPTII,S$GLB,, | Performed by: PODIATRIST

## 2022-01-12 PROCEDURE — 1159F PR MEDICATION LIST DOCUMENTED IN MEDICAL RECORD: ICD-10-PCS | Mod: CPTII,S$GLB,, | Performed by: PODIATRIST

## 2022-01-12 PROCEDURE — 1101F PT FALLS ASSESS-DOCD LE1/YR: CPT | Mod: CPTII,S$GLB,, | Performed by: PODIATRIST

## 2022-01-12 PROCEDURE — 3288F FALL RISK ASSESSMENT DOCD: CPT | Mod: CPTII,S$GLB,, | Performed by: PODIATRIST

## 2022-01-12 PROCEDURE — 90694 VACC AIIV4 NO PRSRV 0.5ML IM: CPT | Mod: S$GLB,,, | Performed by: FAMILY MEDICINE

## 2022-01-12 PROCEDURE — 1101F PR PT FALLS ASSESS DOC 0-1 FALLS W/OUT INJ PAST YR: ICD-10-PCS | Mod: CPTII,S$GLB,, | Performed by: PODIATRIST

## 2022-01-12 PROCEDURE — 90694 FLU VACCINE - QUADRIVALENT - ADJUVANTED: ICD-10-PCS | Mod: S$GLB,,, | Performed by: FAMILY MEDICINE

## 2022-01-12 RX ORDER — CICLOPIROX 80 MG/ML
SOLUTION TOPICAL
Qty: 6.6 ML | Refills: 11 | Status: SHIPPED | OUTPATIENT
Start: 2022-01-12 | End: 2022-08-05

## 2022-01-12 NOTE — PROGRESS NOTES
[facility]  PODIATRIC MEDICINE AND SURGERY      CHIEF COMPLAINT   Chief Complaint   Patient presents with    Foot Problem     C/o discoloration of the right hallux toenail, some discoloration on 5th toenail, 0 pain, diabetic, wears boots, last seen PCP Aug 2021         HPI:    Aretha Holt is a 76 y.o. female presenting to podiatry clinic with complaint of discoloration on toenails, specifically right great toenail. The patient has tried over the counter medications with some success, but the problem is recurrent and aggravating. Patient inquires about available treatment options. No further pedal complaints.      PMH  Past Medical History:   Diagnosis Date    Diabetes mellitus        PROBLEM LIST  Patient Active Problem List    Diagnosis Date Noted    Debility 2021    Cystitis 2021    Anxiety 2021    Type 2 diabetes mellitus without complication 2021    Left shoulder pain 2021    Syncope 2017    Bilateral edema of lower extremity 2017       MEDS  Current Outpatient Medications on File Prior to Visit   Medication Sig Dispense Refill    clobetasoL (TEMOVATE) 0.05 % cream Apply topically 2 (two) times daily.      furosemide (LASIX) 20 MG tablet Take 20 mg by mouth 2 (two) times daily.      HYDROcodone-acetaminophen (NORCO) 5-325 mg per tablet Take 1 tablet by mouth every 6 (six) hours as needed for Pain. 15 tablet 0    hydrocortisone 2.5 % cream Apply topically 2 (two) times daily. 3.5 g 0     No current facility-administered medications on file prior to visit.       PSH     Past Surgical History:   Procedure Laterality Date     SECTION      CHOLECYSTECTOMY          ALL  Review of patient's allergies indicates:   Allergen Reactions    Decadron [dexamethasone] Other (See Comments)     Syncope (causes spike in blood glucose with previous bout of syncope)    Celestone [betamethasone] Other (See Comments)     Syncope (causes spike  "in blood glucose with previous bout of syncope)       SOC     Social History     Tobacco Use    Smoking status: Former Smoker     Quit date: 6/10/2014     Years since quittin.5    Smokeless tobacco: Never Used   Substance Use Topics    Alcohol use: No    Drug use: No         Family HX    Family History   Problem Relation Age of Onset    Diabetes Mother     No Known Problems Father             REVIEW OF SYSTEMS  General: Denies any fever or chills  Chest: Denies shortness of breath, wheezing, coughing, or sputum production  Heart: Denies chest pain, cold extremities, orthopenia, or reduced exercise tolerance  As noted above and per history of current illness above, otherwise negative in the remainder of the 14 systems.      PHYSICAL EXAM:      Vitals:    22 1342   Weight: 81.6 kg (180 lb)   Height: 5' 4" (1.626 m)   PainSc: 0-No pain       General: This patient is well-developed, well-nourished and appears stated age, well-oriented to person, place and time, and cooperative and pleasant on today's visit    LOWER EXTREMITY PHYSICAL EXAM  VASCULAR  Dorsalis pedis and posterior tibial pulses palpable 2/4 bilaterally.   Capillary refill time immediate to the toes.   Feet are warm to the touch. Skin temperature warm to warm from proximally to distally   There are no varicosities, telangiectasias noted to bilateral foot and ankle regions.   There are no ecchymoses noted to bilateral foot and ankle regions.   There is no gross lower extremity edema.    DERMATOLOGIC  There are thickened discolored, elongated mycotic nails suggestive of onychomycosis   There is longitudinal melanonychia , no long's sign noted   Skin moist with healthy texture and turgor.  There are no open ulcerations, lacerations, or fissures to bilateral foot and ankle regions. There are no signs of infection as there is no erythema, no proximal-extending lymphangiitis, no fluctuance, or crepitus noted on palpation to bilateral foot " and ankle regions.   There is no interdigital maceration.   There are hyperkeratotic lesions noted to feet.     NEUROLOGIC  Epicritic sensation is intact as the patient is able to sense light touch to bilateral foot and ankle regions.   Achilles and patellar deep tendon reflexes intact  Babinski reflex absent    ORTHOPEDIC/BIOMECHANICAL  No symptomatic structural abnormalities noted  Muscle strength AT/EHL/EDL/PT: 5/5; Achilles/Gastroc/Soleus: 5/5; PB/PL: 5/5 Muscle tone is normal.  Ankle joint ROM INTACT DF/PF, non-crepitus      ASSESSMENT   Dermatophytosis of nail    Longitudinal melanonychia    Other orders  -     ciclopirox (PENLAC) 8 % Soln; Apply to affected toenails at night time DAILY. On 7th day, file nails down, clean all nails with alcohol and restart application process.  Dispense: 6.6 mL; Refill: 11        PLAN    1. Patient was educated about clinical and imaging findings, and verbalizes understanding of above.  2. I Discussed treatment options for nail fungus.   -I explained that fungus lives in a warm dark moist environment and therefore patient should make every attempt to keep feet clean and dry.  We discussed drying feet thoroughly after shower particularly between the toes and then applying powder between the toes and in the shoes. I also discussed to disinfect shower tub, discard old shoes, and disinfect current shoes with antiseptic  -For fungal toenails I prescribed PENLAC nail lacquer to be used daily for up to a year.  I have provided the patient written instructions on topical solution application   I also discussed oral Lamisil but I did not recommend it as a first line of treatment since it is an internal medicine that may potentially have side effects, including liver problems.         Report Electronically Signed By:     Megan Castle DPM   Podiatry  [facility]  1/12/2022

## 2022-02-04 ENCOUNTER — LAB VISIT (OUTPATIENT)
Dept: LAB | Facility: HOSPITAL | Age: 77
End: 2022-02-04
Attending: FAMILY MEDICINE
Payer: MEDICARE

## 2022-02-04 ENCOUNTER — OFFICE VISIT (OUTPATIENT)
Dept: INTERNAL MEDICINE | Facility: CLINIC | Age: 77
End: 2022-02-04
Payer: MEDICARE

## 2022-02-04 VITALS
WEIGHT: 173.5 LBS | BODY MASS INDEX: 29.78 KG/M2 | TEMPERATURE: 98 F | DIASTOLIC BLOOD PRESSURE: 70 MMHG | HEART RATE: 66 BPM | SYSTOLIC BLOOD PRESSURE: 130 MMHG | OXYGEN SATURATION: 98 %

## 2022-02-04 DIAGNOSIS — E11.9 TYPE 2 DIABETES MELLITUS WITHOUT COMPLICATION, WITHOUT LONG-TERM CURRENT USE OF INSULIN: Primary | ICD-10-CM

## 2022-02-04 DIAGNOSIS — E11.9 TYPE 2 DIABETES MELLITUS WITHOUT COMPLICATION, WITHOUT LONG-TERM CURRENT USE OF INSULIN: ICD-10-CM

## 2022-02-04 DIAGNOSIS — Z78.0 POSTMENOPAUSAL: ICD-10-CM

## 2022-02-04 DIAGNOSIS — R60.0 BILATERAL EDEMA OF LOWER EXTREMITY: ICD-10-CM

## 2022-02-04 DIAGNOSIS — B35.1 TOENAIL FUNGUS: ICD-10-CM

## 2022-02-04 DIAGNOSIS — R55 SYNCOPE, UNSPECIFIED SYNCOPE TYPE: ICD-10-CM

## 2022-02-04 DIAGNOSIS — Z28.9 DELAYED IMMUNIZATIONS: ICD-10-CM

## 2022-02-04 DIAGNOSIS — I34.0 MITRAL VALVE INSUFFICIENCY, UNSPECIFIED ETIOLOGY: Primary | ICD-10-CM

## 2022-02-04 DIAGNOSIS — I34.0 MITRAL VALVE INSUFFICIENCY, UNSPECIFIED ETIOLOGY: ICD-10-CM

## 2022-02-04 PROBLEM — N30.90 CYSTITIS: Status: RESOLVED | Noted: 2021-06-13 | Resolved: 2022-02-04

## 2022-02-04 PROBLEM — M79.673 PAIN OF FOOT: Status: ACTIVE | Noted: 2022-02-04

## 2022-02-04 PROCEDURE — 99999 PR PBB SHADOW E&M-EST. PATIENT-LVL V: ICD-10-PCS | Mod: PBBFAC,,, | Performed by: FAMILY MEDICINE

## 2022-02-04 PROCEDURE — 90732 PNEUMOCOCCAL POLYSACCHARIDE VACCINE 23-VALENT =>2YO SQ IM: ICD-10-PCS | Mod: S$GLB,,, | Performed by: FAMILY MEDICINE

## 2022-02-04 PROCEDURE — 99204 PR OFFICE/OUTPT VISIT, NEW, LEVL IV, 45-59 MIN: ICD-10-PCS | Mod: S$GLB,,, | Performed by: FAMILY MEDICINE

## 2022-02-04 PROCEDURE — 80061 LIPID PANEL: CPT | Performed by: FAMILY MEDICINE

## 2022-02-04 PROCEDURE — 1159F MED LIST DOCD IN RCRD: CPT | Mod: CPTII,S$GLB,, | Performed by: FAMILY MEDICINE

## 2022-02-04 PROCEDURE — 3078F PR MOST RECENT DIASTOLIC BLOOD PRESSURE < 80 MM HG: ICD-10-PCS | Mod: CPTII,S$GLB,, | Performed by: FAMILY MEDICINE

## 2022-02-04 PROCEDURE — 99999 PR PBB SHADOW E&M-EST. PATIENT-LVL V: CPT | Mod: PBBFAC,,, | Performed by: FAMILY MEDICINE

## 2022-02-04 PROCEDURE — 1159F PR MEDICATION LIST DOCUMENTED IN MEDICAL RECORD: ICD-10-PCS | Mod: CPTII,S$GLB,, | Performed by: FAMILY MEDICINE

## 2022-02-04 PROCEDURE — 90732 PPSV23 VACC 2 YRS+ SUBQ/IM: CPT | Mod: S$GLB,,, | Performed by: FAMILY MEDICINE

## 2022-02-04 PROCEDURE — 3288F PR FALLS RISK ASSESSMENT DOCUMENTED: ICD-10-PCS | Mod: CPTII,S$GLB,, | Performed by: FAMILY MEDICINE

## 2022-02-04 PROCEDURE — 36415 COLL VENOUS BLD VENIPUNCTURE: CPT | Mod: PO | Performed by: FAMILY MEDICINE

## 2022-02-04 PROCEDURE — 99204 OFFICE O/P NEW MOD 45 MIN: CPT | Mod: S$GLB,,, | Performed by: FAMILY MEDICINE

## 2022-02-04 PROCEDURE — 1101F PR PT FALLS ASSESS DOC 0-1 FALLS W/OUT INJ PAST YR: ICD-10-PCS | Mod: CPTII,S$GLB,, | Performed by: FAMILY MEDICINE

## 2022-02-04 PROCEDURE — 83036 HEMOGLOBIN GLYCOSYLATED A1C: CPT | Performed by: FAMILY MEDICINE

## 2022-02-04 PROCEDURE — 3075F SYST BP GE 130 - 139MM HG: CPT | Mod: CPTII,S$GLB,, | Performed by: FAMILY MEDICINE

## 2022-02-04 PROCEDURE — 1126F PR PAIN SEVERITY QUANTIFIED, NO PAIN PRESENT: ICD-10-PCS | Mod: CPTII,S$GLB,, | Performed by: FAMILY MEDICINE

## 2022-02-04 PROCEDURE — 1126F AMNT PAIN NOTED NONE PRSNT: CPT | Mod: CPTII,S$GLB,, | Performed by: FAMILY MEDICINE

## 2022-02-04 PROCEDURE — 3288F FALL RISK ASSESSMENT DOCD: CPT | Mod: CPTII,S$GLB,, | Performed by: FAMILY MEDICINE

## 2022-02-04 PROCEDURE — 3075F PR MOST RECENT SYSTOLIC BLOOD PRESS GE 130-139MM HG: ICD-10-PCS | Mod: CPTII,S$GLB,, | Performed by: FAMILY MEDICINE

## 2022-02-04 PROCEDURE — G0009 ADMIN PNEUMOCOCCAL VACCINE: HCPCS | Mod: S$GLB,,, | Performed by: FAMILY MEDICINE

## 2022-02-04 PROCEDURE — 3078F DIAST BP <80 MM HG: CPT | Mod: CPTII,S$GLB,, | Performed by: FAMILY MEDICINE

## 2022-02-04 PROCEDURE — G0009 PNEUMOCOCCAL POLYSACCHARIDE VACCINE 23-VALENT =>2YO SQ IM: ICD-10-PCS | Mod: S$GLB,,, | Performed by: FAMILY MEDICINE

## 2022-02-04 PROCEDURE — 80053 COMPREHEN METABOLIC PANEL: CPT | Performed by: FAMILY MEDICINE

## 2022-02-04 PROCEDURE — 1101F PT FALLS ASSESS-DOCD LE1/YR: CPT | Mod: CPTII,S$GLB,, | Performed by: FAMILY MEDICINE

## 2022-02-04 NOTE — PROGRESS NOTES
Subjective:       Patient ID: Aretha Holt is a 76 y.o. female.    Chief Complaint: Annual Exam    Diabetes  She presents for her initial diabetic visit. She has type 2 diabetes mellitus. Her disease course has been stable. Pertinent negatives for hypoglycemia include no confusion, dizziness, headaches or hunger. Pertinent negatives for diabetes include no blurred vision, no chest pain, no fatigue and no foot paresthesias. Pertinent negatives for hypoglycemia complications include no blackouts and no hospitalization. Symptoms are stable.     Review of Systems   Constitutional: Negative for fatigue.   Eyes: Negative for blurred vision.   Respiratory: Negative for shortness of breath.    Cardiovascular: Negative for chest pain.   Gastrointestinal: Negative for abdominal pain.   Neurological: Negative for dizziness and headaches.   Psychiatric/Behavioral: Negative for confusion.       Objective:      Physical Exam  Vitals and nursing note reviewed.   Constitutional:       General: She is not in acute distress.     Appearance: Normal appearance. She is well-developed. She is not diaphoretic.   HENT:      Head: Normocephalic and atraumatic.   Pulmonary:      Effort: Pulmonary effort is normal. No respiratory distress.      Breath sounds: Normal breath sounds. No wheezing.   Skin:     General: Skin is warm and dry.      Findings: No erythema or rash.   Neurological:      Mental Status: She is alert.   Psychiatric:         Mood and Affect: Mood normal.         Behavior: Behavior normal.         Thought Content: Thought content normal.         Judgment: Judgment normal.         Assessment:       1. Type 2 diabetes mellitus without complication, without long-term current use of insulin    2. Delayed immunizations    3. Bilateral edema of lower extremity    4. Postmenopausal    5. Mitral valve insufficiency, unspecified etiology    6. Toenail fungus        Plan:     Problem List Items Addressed This Visit         Cardiac/Vascular    Mitral valve insufficiency    Relevant Orders    Ambulatory referral/consult to Cardiology       Renal/    Postmenopausal    Relevant Orders    DXA Bone Density Spine And Hip       ID    Delayed immunizations    Relevant Orders    Pneumococcal Polysaccharide Vaccine (23 Valent) (SQ/IM)       Endocrine    Type 2 diabetes mellitus without complication - Primary (Chronic)    Overview     Chronic, Stable, diet mitigated         Relevant Orders    Hemoglobin A1C    Lipid Panel    Microalbumin/Creatinine Ratio, Urine    Comprehensive Metabolic Panel    Ambulatory referral/consult to Podiatry    Ambulatory referral/consult to Optometry       Orthopedic    Pain of foot       Other    Bilateral edema of lower extremity    Overview     Chronic, Stable, cont lasix

## 2022-02-05 LAB
ALBUMIN SERPL BCP-MCNC: 4.2 G/DL (ref 3.5–5.2)
ALP SERPL-CCNC: 132 U/L (ref 55–135)
ALT SERPL W/O P-5'-P-CCNC: 16 U/L (ref 10–44)
ANION GAP SERPL CALC-SCNC: 9 MMOL/L (ref 8–16)
AST SERPL-CCNC: 21 U/L (ref 10–40)
BILIRUB SERPL-MCNC: 0.5 MG/DL (ref 0.1–1)
BUN SERPL-MCNC: 12 MG/DL (ref 8–23)
CALCIUM SERPL-MCNC: 10.1 MG/DL (ref 8.7–10.5)
CHLORIDE SERPL-SCNC: 103 MMOL/L (ref 95–110)
CHOLEST SERPL-MCNC: 165 MG/DL (ref 120–199)
CHOLEST/HDLC SERPL: 3.3 {RATIO} (ref 2–5)
CO2 SERPL-SCNC: 27 MMOL/L (ref 23–29)
CREAT SERPL-MCNC: 1.1 MG/DL (ref 0.5–1.4)
EST. GFR  (AFRICAN AMERICAN): 56.4 ML/MIN/1.73 M^2
EST. GFR  (NON AFRICAN AMERICAN): 48.9 ML/MIN/1.73 M^2
ESTIMATED AVG GLUCOSE: 134 MG/DL (ref 68–131)
GLUCOSE SERPL-MCNC: 99 MG/DL (ref 70–110)
HBA1C MFR BLD: 6.3 % (ref 4–5.6)
HDLC SERPL-MCNC: 50 MG/DL (ref 40–75)
HDLC SERPL: 30.3 % (ref 20–50)
LDLC SERPL CALC-MCNC: 94 MG/DL (ref 63–159)
NONHDLC SERPL-MCNC: 115 MG/DL
POTASSIUM SERPL-SCNC: 4 MMOL/L (ref 3.5–5.1)
PROT SERPL-MCNC: 8 G/DL (ref 6–8.4)
SODIUM SERPL-SCNC: 139 MMOL/L (ref 136–145)
TRIGL SERPL-MCNC: 105 MG/DL (ref 30–150)

## 2022-02-05 NOTE — PROGRESS NOTES
Excellent cholesterol, A1c has decreased to the prediabetic range.  Metabolic panel looks good.    Patient does have minor decrease in her kidney function, this could be due to some mild dehydration.  But nothing significant at this time  Excellent lab work overall.  Keep up the good work.

## 2022-02-07 ENCOUNTER — OFFICE VISIT (OUTPATIENT)
Dept: OPHTHALMOLOGY | Facility: CLINIC | Age: 77
End: 2022-02-07
Payer: MEDICARE

## 2022-02-07 ENCOUNTER — OFFICE VISIT (OUTPATIENT)
Dept: CARDIOLOGY | Facility: CLINIC | Age: 77
End: 2022-02-07
Payer: MEDICARE

## 2022-02-07 ENCOUNTER — HOSPITAL ENCOUNTER (OUTPATIENT)
Dept: CARDIOLOGY | Facility: HOSPITAL | Age: 77
Discharge: HOME | End: 2022-02-07
Attending: INTERNAL MEDICINE
Payer: MEDICARE

## 2022-02-07 ENCOUNTER — OFFICE VISIT (OUTPATIENT)
Dept: PODIATRY | Facility: CLINIC | Age: 77
End: 2022-02-07
Payer: MEDICARE

## 2022-02-07 VITALS — HEIGHT: 64 IN | BODY MASS INDEX: 29.53 KG/M2 | WEIGHT: 173 LBS

## 2022-02-07 VITALS
BODY MASS INDEX: 29.99 KG/M2 | RESPIRATION RATE: 16 BRPM | HEART RATE: 78 BPM | HEIGHT: 64 IN | WEIGHT: 175.69 LBS | SYSTOLIC BLOOD PRESSURE: 124 MMHG | OXYGEN SATURATION: 96 % | DIASTOLIC BLOOD PRESSURE: 72 MMHG

## 2022-02-07 DIAGNOSIS — B35.1 DERMATOPHYTOSIS OF NAIL: ICD-10-CM

## 2022-02-07 DIAGNOSIS — R73.03 PREDIABETES: ICD-10-CM

## 2022-02-07 DIAGNOSIS — R55 SYNCOPE, UNSPECIFIED SYNCOPE TYPE: ICD-10-CM

## 2022-02-07 DIAGNOSIS — M79.675 TOE PAIN, BILATERAL: Primary | ICD-10-CM

## 2022-02-07 DIAGNOSIS — I34.0 MITRAL VALVE INSUFFICIENCY, UNSPECIFIED ETIOLOGY: ICD-10-CM

## 2022-02-07 DIAGNOSIS — H52.7 REFRACTIVE DISORDER: ICD-10-CM

## 2022-02-07 DIAGNOSIS — H25.12 NUCLEAR SCLEROSIS OF LEFT EYE: ICD-10-CM

## 2022-02-07 DIAGNOSIS — M79.674 TOE PAIN, BILATERAL: Primary | ICD-10-CM

## 2022-02-07 DIAGNOSIS — H25.11 NUCLEAR SCLEROSIS OF RIGHT EYE: ICD-10-CM

## 2022-02-07 DIAGNOSIS — R60.0 BILATERAL EDEMA OF LOWER EXTREMITY: ICD-10-CM

## 2022-02-07 DIAGNOSIS — I34.0 MITRAL VALVE INSUFFICIENCY, UNSPECIFIED ETIOLOGY: Primary | ICD-10-CM

## 2022-02-07 DIAGNOSIS — E11.9 TYPE 2 DIABETES MELLITUS WITHOUT COMPLICATION, WITHOUT LONG-TERM CURRENT USE OF INSULIN: ICD-10-CM

## 2022-02-07 DIAGNOSIS — E11.9 TYPE 2 DIABETES MELLITUS WITHOUT COMPLICATION, WITHOUT LONG-TERM CURRENT USE OF INSULIN: Primary | ICD-10-CM

## 2022-02-07 DIAGNOSIS — F41.9 ANXIETY: Chronic | ICD-10-CM

## 2022-02-07 PROCEDURE — 3074F SYST BP LT 130 MM HG: CPT | Mod: CPTII,S$GLB,, | Performed by: INTERNAL MEDICINE

## 2022-02-07 PROCEDURE — 1160F RVW MEDS BY RX/DR IN RCRD: CPT | Mod: CPTII,S$GLB,, | Performed by: STUDENT IN AN ORGANIZED HEALTH CARE EDUCATION/TRAINING PROGRAM

## 2022-02-07 PROCEDURE — 1159F PR MEDICATION LIST DOCUMENTED IN MEDICAL RECORD: ICD-10-PCS | Mod: CPTII,S$GLB,, | Performed by: INTERNAL MEDICINE

## 2022-02-07 PROCEDURE — 1160F PR REVIEW ALL MEDS BY PRESCRIBER/CLIN PHARMACIST DOCUMENTED: ICD-10-PCS | Mod: CPTII,S$GLB,, | Performed by: INTERNAL MEDICINE

## 2022-02-07 PROCEDURE — 3078F PR MOST RECENT DIASTOLIC BLOOD PRESSURE < 80 MM HG: ICD-10-PCS | Mod: CPTII,S$GLB,, | Performed by: INTERNAL MEDICINE

## 2022-02-07 PROCEDURE — 1160F PR REVIEW ALL MEDS BY PRESCRIBER/CLIN PHARMACIST DOCUMENTED: ICD-10-PCS | Mod: CPTII,S$GLB,, | Performed by: STUDENT IN AN ORGANIZED HEALTH CARE EDUCATION/TRAINING PROGRAM

## 2022-02-07 PROCEDURE — 11721 DEBRIDE NAIL 6 OR MORE: CPT | Mod: S$GLB,,, | Performed by: PODIATRIST

## 2022-02-07 PROCEDURE — 93010 ELECTROCARDIOGRAM REPORT: CPT | Mod: ,,, | Performed by: INTERNAL MEDICINE

## 2022-02-07 PROCEDURE — 92015 PR REFRACTION: ICD-10-PCS | Mod: S$GLB,,, | Performed by: STUDENT IN AN ORGANIZED HEALTH CARE EDUCATION/TRAINING PROGRAM

## 2022-02-07 PROCEDURE — 1126F AMNT PAIN NOTED NONE PRSNT: CPT | Mod: CPTII,S$GLB,, | Performed by: INTERNAL MEDICINE

## 2022-02-07 PROCEDURE — 1159F MED LIST DOCD IN RCRD: CPT | Mod: CPTII,S$GLB,, | Performed by: STUDENT IN AN ORGANIZED HEALTH CARE EDUCATION/TRAINING PROGRAM

## 2022-02-07 PROCEDURE — 99999 PR PBB SHADOW E&M-EST. PATIENT-LVL III: CPT | Mod: PBBFAC,,, | Performed by: PODIATRIST

## 2022-02-07 PROCEDURE — 3288F PR FALLS RISK ASSESSMENT DOCUMENTED: ICD-10-PCS | Mod: CPTII,S$GLB,, | Performed by: PODIATRIST

## 2022-02-07 PROCEDURE — 1160F RVW MEDS BY RX/DR IN RCRD: CPT | Mod: CPTII,S$GLB,, | Performed by: INTERNAL MEDICINE

## 2022-02-07 PROCEDURE — 1126F PR PAIN SEVERITY QUANTIFIED, NO PAIN PRESENT: ICD-10-PCS | Mod: CPTII,S$GLB,, | Performed by: INTERNAL MEDICINE

## 2022-02-07 PROCEDURE — 3288F FALL RISK ASSESSMENT DOCD: CPT | Mod: CPTII,S$GLB,, | Performed by: INTERNAL MEDICINE

## 2022-02-07 PROCEDURE — 1126F AMNT PAIN NOTED NONE PRSNT: CPT | Mod: CPTII,S$GLB,, | Performed by: PODIATRIST

## 2022-02-07 PROCEDURE — 3288F PR FALLS RISK ASSESSMENT DOCUMENTED: ICD-10-PCS | Mod: CPTII,S$GLB,, | Performed by: INTERNAL MEDICINE

## 2022-02-07 PROCEDURE — 1159F MED LIST DOCD IN RCRD: CPT | Mod: CPTII,S$GLB,, | Performed by: INTERNAL MEDICINE

## 2022-02-07 PROCEDURE — 1101F PR PT FALLS ASSESS DOC 0-1 FALLS W/OUT INJ PAST YR: ICD-10-PCS | Mod: CPTII,S$GLB,, | Performed by: INTERNAL MEDICINE

## 2022-02-07 PROCEDURE — 99204 OFFICE O/P NEW MOD 45 MIN: CPT | Mod: S$GLB,,, | Performed by: INTERNAL MEDICINE

## 2022-02-07 PROCEDURE — 93010 EKG 12-LEAD: ICD-10-PCS | Mod: ,,, | Performed by: INTERNAL MEDICINE

## 2022-02-07 PROCEDURE — 1159F PR MEDICATION LIST DOCUMENTED IN MEDICAL RECORD: ICD-10-PCS | Mod: CPTII,S$GLB,, | Performed by: PODIATRIST

## 2022-02-07 PROCEDURE — 93005 ELECTROCARDIOGRAM TRACING: CPT | Mod: PO

## 2022-02-07 PROCEDURE — 1101F PT FALLS ASSESS-DOCD LE1/YR: CPT | Mod: CPTII,S$GLB,, | Performed by: PODIATRIST

## 2022-02-07 PROCEDURE — 99999 PR PBB SHADOW E&M-EST. PATIENT-LVL III: CPT | Mod: PBBFAC,,, | Performed by: INTERNAL MEDICINE

## 2022-02-07 PROCEDURE — 99204 PR OFFICE/OUTPT VISIT, NEW, LEVL IV, 45-59 MIN: ICD-10-PCS | Mod: S$GLB,,, | Performed by: INTERNAL MEDICINE

## 2022-02-07 PROCEDURE — 1159F PR MEDICATION LIST DOCUMENTED IN MEDICAL RECORD: ICD-10-PCS | Mod: CPTII,S$GLB,, | Performed by: STUDENT IN AN ORGANIZED HEALTH CARE EDUCATION/TRAINING PROGRAM

## 2022-02-07 PROCEDURE — 99213 PR OFFICE/OUTPT VISIT, EST, LEVL III, 20-29 MIN: ICD-10-PCS | Mod: 25,S$GLB,, | Performed by: PODIATRIST

## 2022-02-07 PROCEDURE — 99203 OFFICE O/P NEW LOW 30 MIN: CPT | Mod: S$GLB,,, | Performed by: STUDENT IN AN ORGANIZED HEALTH CARE EDUCATION/TRAINING PROGRAM

## 2022-02-07 PROCEDURE — 99213 OFFICE O/P EST LOW 20 MIN: CPT | Mod: 25,S$GLB,, | Performed by: PODIATRIST

## 2022-02-07 PROCEDURE — 99203 PR OFFICE/OUTPT VISIT, NEW, LEVL III, 30-44 MIN: ICD-10-PCS | Mod: S$GLB,,, | Performed by: STUDENT IN AN ORGANIZED HEALTH CARE EDUCATION/TRAINING PROGRAM

## 2022-02-07 PROCEDURE — 92015 DETERMINE REFRACTIVE STATE: CPT | Mod: S$GLB,,, | Performed by: STUDENT IN AN ORGANIZED HEALTH CARE EDUCATION/TRAINING PROGRAM

## 2022-02-07 PROCEDURE — 99999 PR PBB SHADOW E&M-EST. PATIENT-LVL II: CPT | Mod: PBBFAC,,, | Performed by: STUDENT IN AN ORGANIZED HEALTH CARE EDUCATION/TRAINING PROGRAM

## 2022-02-07 PROCEDURE — 3288F FALL RISK ASSESSMENT DOCD: CPT | Mod: CPTII,S$GLB,, | Performed by: PODIATRIST

## 2022-02-07 PROCEDURE — 99999 PR PBB SHADOW E&M-EST. PATIENT-LVL II: ICD-10-PCS | Mod: PBBFAC,,, | Performed by: STUDENT IN AN ORGANIZED HEALTH CARE EDUCATION/TRAINING PROGRAM

## 2022-02-07 PROCEDURE — 1101F PR PT FALLS ASSESS DOC 0-1 FALLS W/OUT INJ PAST YR: ICD-10-PCS | Mod: CPTII,S$GLB,, | Performed by: PODIATRIST

## 2022-02-07 PROCEDURE — 99999 PR PBB SHADOW E&M-EST. PATIENT-LVL III: ICD-10-PCS | Mod: PBBFAC,,, | Performed by: INTERNAL MEDICINE

## 2022-02-07 PROCEDURE — 3078F DIAST BP <80 MM HG: CPT | Mod: CPTII,S$GLB,, | Performed by: INTERNAL MEDICINE

## 2022-02-07 PROCEDURE — 99999 PR PBB SHADOW E&M-EST. PATIENT-LVL III: ICD-10-PCS | Mod: PBBFAC,,, | Performed by: PODIATRIST

## 2022-02-07 PROCEDURE — 3074F PR MOST RECENT SYSTOLIC BLOOD PRESSURE < 130 MM HG: ICD-10-PCS | Mod: CPTII,S$GLB,, | Performed by: INTERNAL MEDICINE

## 2022-02-07 PROCEDURE — 11721 PR DEBRIDEMENT OF NAILS, 6 OR MORE: ICD-10-PCS | Mod: S$GLB,,, | Performed by: PODIATRIST

## 2022-02-07 PROCEDURE — 1126F PR PAIN SEVERITY QUANTIFIED, NO PAIN PRESENT: ICD-10-PCS | Mod: CPTII,S$GLB,, | Performed by: PODIATRIST

## 2022-02-07 PROCEDURE — 1101F PT FALLS ASSESS-DOCD LE1/YR: CPT | Mod: CPTII,S$GLB,, | Performed by: INTERNAL MEDICINE

## 2022-02-07 PROCEDURE — 1159F MED LIST DOCD IN RCRD: CPT | Mod: CPTII,S$GLB,, | Performed by: PODIATRIST

## 2022-02-07 RX ORDER — FUROSEMIDE 20 MG/1
20 TABLET ORAL DAILY PRN
Qty: 90 TABLET | Refills: 1 | Status: SHIPPED | OUTPATIENT
Start: 2022-02-07 | End: 2022-08-05

## 2022-02-07 NOTE — PROGRESS NOTES
PODIATRIC MEDICINE AND SURGERY   Jhonny Rivas MD    CHIEF COMPLAINT  Chief Complaint   Patient presents with    Routine Foot Care     F/u for RFC, 0 pain in feet, diabetic, wears boots and socks, last seen Dr. Rivas on 22         HPI    SUBJECTIVE: Aretha Holt is a 76 y.o. female who  has a past medical history of Cystitis (2021) and Diabetes mellitus. Aretha presenting to podiatry clinic with complaint of thickened, discolored, and painful toenails. Pt states nails result in pain with enclosed shoe wear aggravated due to pressure  and/or with ambulation. They are unable to trim nails. They are requesting nail care for relief of painful symptoms.    She currently is using PENLAC for nail treatment. Patient has no further pedal complaints.      PMH  Past Medical History:   Diagnosis Date    Cystitis 2021    Diabetes mellitus      Patient Active Problem List   Diagnosis    Syncope    Bilateral edema of lower extremity    Left shoulder pain    Debility    Anxiety    Type 2 diabetes mellitus without complication    Delayed immunizations    Postmenopausal    Mitral valve insufficiency    Pain of foot       MEDS  Current Outpatient Medications on File Prior to Visit   Medication Sig Dispense Refill    ciclopirox (PENLAC) 8 % Soln Apply to affected toenails at night time DAILY. On  day, file nails down, clean all nails with alcohol and restart application process. 6.6 mL 11    furosemide (LASIX) 20 MG tablet Take 20 mg by mouth 2 (two) times daily.      HYDROcodone-acetaminophen (NORCO) 5-325 mg per tablet Take 1 tablet by mouth every 6 (six) hours as needed for Pain. 15 tablet 0     No current facility-administered medications on file prior to visit.       PSH     Past Surgical History:   Procedure Laterality Date     SECTION      CHOLECYSTECTOMY      CHOLECYSTECTOMY          ALL  Review of patient's allergies indicates:   Allergen Reactions    Decadron  "[dexamethasone] Other (See Comments)     Syncope (causes spike in blood glucose with previous bout of syncope)    Celestone [betamethasone sodium phosphate]     Celestone [betamethasone] Other (See Comments)     Syncope (causes spike in blood glucose with previous bout of syncope)    Decadron-la        SOC     Social History     Tobacco Use    Smoking status: Former Smoker     Quit date: 6/10/2014     Years since quittin.6    Smokeless tobacco: Never Used   Substance Use Topics    Alcohol use: No    Drug use: No         Family HX    Family History   Problem Relation Age of Onset    Diabetes Mother     No Known Problems Father             REVIEW OF SYSTEMS  General: Denies any fever or chills  Chest: Denies shortness of breath, wheezing, coughing, or sputum production  Heart: Denies chest pain.  As noted above and per history of current illness above, otherwise negative in the remainder of the 14 systems.     PHYSICAL EXAM  Vitals:    22 1029   Weight: 78.5 kg (173 lb)   Height: 5' 4" (1.626 m)   PainSc: 0-No pain       GEN:  This patient is well-developed, well-nourished and appears stated age, well-oriented to person, place and time, and cooperative and pleasant on today's visit.      LOWER EXTREMITY    VASCULAR  DP pedal pulse 2/4 RIGHT, LEFT2/4   PT pedal pulse 2/4 RIGHT, LEFT2/4  Capillary refill time immediate to the toes.   Feet are warm to the touch. Skin temperature warm to warm from proximally to distally   There are no varicosities, telangiectasias noted to bilateral foot and ankle regions.   There are no ecchymoses noted to bilateral foot and ankle regions.   There is no gross lower extremity edema.    DERMATOLOGIC  Skin moist with healthy texture and turgor.  Thickened, dystrophic, elongated, discolored toenails with subungal debris 1,2, 3, 4, 5 RIGHT FOOT, 1, 2, 3, 4 , 5 LEFT FOOT    There are no open ulcerations, lacerations, or fissures to bilateral foot and ankle regions. There are " no signs of infection as there is no erythema, no proximal-extending lymphangiitis, no fluctuance, or crepitus noted on palpation to bilateral foot and ankle regions.   There is no interdigital maceration.   There are no hyperkeratotic lesions noted to feet.    NEUROLOGIC  Neurological sensation is grossly intact to all sites tested    ORTHOPEDIC/BIOMECHANICAL  No symptomatic structural abnormalities noted. Muscle strength is 5/5 for foot inverters, everters, plantarflexors, and dorsiflexors. Muscle tone is normal.   Inspection/palpation of bone, joints and muscles unremarkable.    ASSESSMENT  Toe pain, bilateral    Type 2 diabetes mellitus without complication, without long-term current use of insulin  -     Ambulatory referral/consult to Podiatry    Dermatophytosis of nail        PLAN  -The patient was examined and evaulated. Patient was given verbal instructions on maintenance care for mycotic nails. The need for proper skin care in order to prevent infection and colonization in the nails was explained to the patient.    - The nails are painful with applied pressure and shoe gear. If a mycotic infection is left untreated, the infection could spread, causing marked limitation of ambulation, and/or make the patient prone to secondary infection. Failure to debride the nails at necessary intervals could likely cause recurrence of pain.   -I recommend Vicks vapor topically once daily to affected toenails. This is a natural antifungal regimen that will help in reduction of fungal load and will also soften the nail to allow for easier debridements.   -With patient's permission, the elongated onychomycotic toenails, as outlined in the physical examination, were sharply debrided with a double action nail nipper to their soft tissue attachment. If indicated, the nails were then smoothed down in thickness with an azalia board to facilitate in further debridement removing all offending nail and subungual debris. Patient  relates relief following the procedure.       Disclaimer: This note was partially prepared using a voice recognition system and is likely to have sound alike errors within the text.        Future Appointments   Date Time Provider Department Center   2/7/2022 11:00 AM EKG, [place] [facility] SPECCPR [place]   2/7/2022 11:20 AM Joe Pierre MD [facility] CARDIO [place]   2/7/2022  1:00 PM Margarita Camacho MD [facility] OPHTHAL [place]   8/5/2022 10:00 AM Jhonny Rivas MD [facility] IM [place]       Report Electronically Signed By:     Megan Castle DPM   Podiatry  [facility]  2/7/2022
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
## 2022-02-07 NOTE — PROGRESS NOTES
HPI     Pt in today for her annual eye exam. Pt states her vision is blurred up   close. Pt states she only wears OTC readers to read and feels she needs a   stronger prescription. Pt denies any ocular pain or discomfort.  Lab Results       Component                Value               Date                       HGBA1C                   6.3 (H)             02/04/2022              Last edited by Daya Valdivia on 2/7/2022  2:05 PM. (History)            Assessment /Plan     For exam results, see Encounter Report.    Type 2 diabetes mellitus without complication, without long-term current use of insulin  -   last A1c 6.3   Strict BG control, f/u w/ PCP, and annual DFE  Stressed importance of DM control to preserve vision      Nuclear sclerosis of right eye- - NVS, monitor    Nuclear sclerosis of left eye- see above    Refractive disorder- MRx dispensed      Return to clinic in 1 year or PRN

## 2022-02-18 ENCOUNTER — HOSPITAL ENCOUNTER (OUTPATIENT)
Dept: CARDIOLOGY | Facility: HOSPITAL | Age: 77
Discharge: HOME | End: 2022-02-18
Attending: INTERNAL MEDICINE
Payer: MEDICARE

## 2022-02-18 VITALS
HEIGHT: 64 IN | SYSTOLIC BLOOD PRESSURE: 124 MMHG | DIASTOLIC BLOOD PRESSURE: 72 MMHG | BODY MASS INDEX: 29.88 KG/M2 | WEIGHT: 175 LBS

## 2022-02-18 DIAGNOSIS — I34.0 MITRAL VALVE INSUFFICIENCY, UNSPECIFIED ETIOLOGY: ICD-10-CM

## 2022-02-18 DIAGNOSIS — R60.0 BILATERAL EDEMA OF LOWER EXTREMITY: ICD-10-CM

## 2022-02-18 DIAGNOSIS — R73.03 PREDIABETES: ICD-10-CM

## 2022-02-18 DIAGNOSIS — F41.9 ANXIETY: ICD-10-CM

## 2022-02-18 LAB
AORTIC ROOT ANNULUS: 3.05 CM
AV INDEX (PROSTH): 0.86
AV MEAN GRADIENT: 2 MMHG
AV PEAK GRADIENT: 4 MMHG
AV VALVE AREA: 3.16 CM2
AV VELOCITY RATIO: 0.97
BSA FOR ECHO PROCEDURE: 1.89 M2
CV ECHO LV RWT: 0.68 CM
DOP CALC AO PEAK VEL: 1.02 M/S
DOP CALC AO VTI: 24 CM
DOP CALC LVOT AREA: 3.7 CM2
DOP CALC LVOT DIAMETER: 2.16 CM
DOP CALC LVOT PEAK VEL: 0.99 M/S
DOP CALC LVOT STROKE VOLUME: 75.81 CM3
DOP CALC RVOT PEAK VEL: 0.6 M/S
DOP CALC RVOT VTI: 13.8 CM
DOP CALCLVOT PEAK VEL VTI: 20.7 CM
E WAVE DECELERATION TIME: 172.77 MSEC
E/A RATIO: 0.91
E/E' RATIO: 8.4 M/S
ECHO EF ESTIMATED: 63 %
ECHO LV POSTERIOR WALL: 0.98 CM (ref 0.6–1.1)
EJECTION FRACTION: 60 %
FRACTIONAL SHORTENING: 32 % (ref 28–44)
INTERVENTRICULAR SEPTUM: 1.08 CM (ref 0.6–1.1)
IVRT: 117.03 MSEC
LA MAJOR: 4.13 CM
LA MINOR: 4.05 CM
LA WIDTH: 2.77 CM
LEFT ATRIUM SIZE: 4.1 CM
LEFT ATRIUM VOLUME INDEX MOD: 13.3 ML/M2
LEFT ATRIUM VOLUME INDEX: 21.3 ML/M2
LEFT ATRIUM VOLUME MOD: 24.66 CM3
LEFT ATRIUM VOLUME: 39.48 CM3
LEFT INTERNAL DIMENSION IN SYSTOLE: 1.94 CM (ref 2.1–4)
LEFT VENTRICLE DIASTOLIC VOLUME INDEX: 17.02 ML/M2
LEFT VENTRICLE DIASTOLIC VOLUME: 31.49 ML
LEFT VENTRICLE MASS INDEX: 44 G/M2
LEFT VENTRICLE SYSTOLIC VOLUME INDEX: 6.4 ML/M2
LEFT VENTRICLE SYSTOLIC VOLUME: 11.76 ML
LEFT VENTRICULAR INTERNAL DIMENSION IN DIASTOLE: 2.87 CM (ref 3.5–6)
LEFT VENTRICULAR MASS: 80.62 G
LV LATERAL E/E' RATIO: 7.64 M/S
LV SEPTAL E/E' RATIO: 9.33 M/S
LVOT MG: 1.95 MMHG
LVOT MV: 0.66 CM/S
MV PEAK A VEL: 0.92 M/S
MV PEAK E VEL: 0.84 M/S
MV STENOSIS PRESSURE HALF TIME: 50.1 MS
MV VALVE AREA P 1/2 METHOD: 4.39 CM2
PISA TR MAX VEL: 2.35 M/S
PV MEAN GRADIENT: 0.88 MMHG
PV PEAK VELOCITY: 0.86 CM/S
RA MAJOR: 4.63 CM
RA WIDTH: 3.1 CM
RIGHT VENTRICULAR END-DIASTOLIC DIMENSION: 2.01 CM
SINUS: 3.23 CM
STJ: 2.69 CM
TDI LATERAL: 0.11 M/S
TDI SEPTAL: 0.09 M/S
TDI: 0.1 M/S
TR MAX PG: 22 MMHG

## 2022-02-18 PROCEDURE — 93306 TTE W/DOPPLER COMPLETE: CPT | Mod: PO

## 2022-02-18 PROCEDURE — 93306 ECHO (CUPID ONLY): ICD-10-PCS | Mod: 26,,, | Performed by: INTERNAL MEDICINE

## 2022-02-18 PROCEDURE — 93306 TTE W/DOPPLER COMPLETE: CPT | Mod: 26,,, | Performed by: INTERNAL MEDICINE

## 2022-03-18 ENCOUNTER — OFFICE VISIT (OUTPATIENT)
Dept: INTERNAL MEDICINE | Facility: CLINIC | Age: 77
End: 2022-03-18
Payer: MEDICARE

## 2022-03-18 VITALS
BODY MASS INDEX: 29.2 KG/M2 | WEIGHT: 171.06 LBS | HEIGHT: 64 IN | TEMPERATURE: 98 F | OXYGEN SATURATION: 97 % | DIASTOLIC BLOOD PRESSURE: 60 MMHG | HEART RATE: 86 BPM | SYSTOLIC BLOOD PRESSURE: 122 MMHG

## 2022-03-18 DIAGNOSIS — A08.4 VIRAL GASTROENTERITIS: Primary | ICD-10-CM

## 2022-03-18 DIAGNOSIS — E11.9 TYPE 2 DIABETES MELLITUS WITHOUT COMPLICATION, WITHOUT LONG-TERM CURRENT USE OF INSULIN: ICD-10-CM

## 2022-03-18 DIAGNOSIS — I34.0 MITRAL VALVE INSUFFICIENCY, UNSPECIFIED ETIOLOGY: ICD-10-CM

## 2022-03-18 PROCEDURE — 1101F PR PT FALLS ASSESS DOC 0-1 FALLS W/OUT INJ PAST YR: ICD-10-PCS | Mod: CPTII,S$GLB,, | Performed by: NURSE PRACTITIONER

## 2022-03-18 PROCEDURE — 3288F PR FALLS RISK ASSESSMENT DOCUMENTED: ICD-10-PCS | Mod: CPTII,S$GLB,, | Performed by: NURSE PRACTITIONER

## 2022-03-18 PROCEDURE — 99214 PR OFFICE/OUTPT VISIT, EST, LEVL IV, 30-39 MIN: ICD-10-PCS | Mod: S$GLB,,, | Performed by: NURSE PRACTITIONER

## 2022-03-18 PROCEDURE — 99999 PR PBB SHADOW E&M-EST. PATIENT-LVL III: ICD-10-PCS | Mod: PBBFAC,,, | Performed by: NURSE PRACTITIONER

## 2022-03-18 PROCEDURE — 3078F PR MOST RECENT DIASTOLIC BLOOD PRESSURE < 80 MM HG: ICD-10-PCS | Mod: CPTII,S$GLB,, | Performed by: NURSE PRACTITIONER

## 2022-03-18 PROCEDURE — 1159F MED LIST DOCD IN RCRD: CPT | Mod: CPTII,S$GLB,, | Performed by: NURSE PRACTITIONER

## 2022-03-18 PROCEDURE — 3074F SYST BP LT 130 MM HG: CPT | Mod: CPTII,S$GLB,, | Performed by: NURSE PRACTITIONER

## 2022-03-18 PROCEDURE — 1160F PR REVIEW ALL MEDS BY PRESCRIBER/CLIN PHARMACIST DOCUMENTED: ICD-10-PCS | Mod: CPTII,S$GLB,, | Performed by: NURSE PRACTITIONER

## 2022-03-18 PROCEDURE — 99214 OFFICE O/P EST MOD 30 MIN: CPT | Mod: S$GLB,,, | Performed by: NURSE PRACTITIONER

## 2022-03-18 PROCEDURE — 3074F PR MOST RECENT SYSTOLIC BLOOD PRESSURE < 130 MM HG: ICD-10-PCS | Mod: CPTII,S$GLB,, | Performed by: NURSE PRACTITIONER

## 2022-03-18 PROCEDURE — 1159F PR MEDICATION LIST DOCUMENTED IN MEDICAL RECORD: ICD-10-PCS | Mod: CPTII,S$GLB,, | Performed by: NURSE PRACTITIONER

## 2022-03-18 PROCEDURE — 99999 PR PBB SHADOW E&M-EST. PATIENT-LVL III: CPT | Mod: PBBFAC,,, | Performed by: NURSE PRACTITIONER

## 2022-03-18 PROCEDURE — 1101F PT FALLS ASSESS-DOCD LE1/YR: CPT | Mod: CPTII,S$GLB,, | Performed by: NURSE PRACTITIONER

## 2022-03-18 PROCEDURE — 3078F DIAST BP <80 MM HG: CPT | Mod: CPTII,S$GLB,, | Performed by: NURSE PRACTITIONER

## 2022-03-18 PROCEDURE — 1160F RVW MEDS BY RX/DR IN RCRD: CPT | Mod: CPTII,S$GLB,, | Performed by: NURSE PRACTITIONER

## 2022-03-18 PROCEDURE — 3288F FALL RISK ASSESSMENT DOCD: CPT | Mod: CPTII,S$GLB,, | Performed by: NURSE PRACTITIONER

## 2022-03-18 RX ORDER — ONDANSETRON 4 MG/1
4 TABLET, ORALLY DISINTEGRATING ORAL EVERY 6 HOURS PRN
Qty: 10 TABLET | Refills: 0 | Status: SHIPPED | OUTPATIENT
Start: 2022-03-18 | End: 2022-08-05

## 2022-03-18 NOTE — PROGRESS NOTES
"Subjective:       Patient ID: Aretha Holt is a 76 y.o. female.    Chief Complaint: Diarrhea, Fatigue, and Abdominal Pain    Patient here for diarrhea  Started Tuesday with watery diarrhea- several episodes  Wednesday diarrhea continued with nausea  Thursday diarrhea continued = 2-3 times, nausea continued  Had diarrhea a little this am, still nauseated  Feels weak    No recent travel  No suspicious foods  No ill contacts  No recent antibiotics  Drinking fluids, gatorade, water  Drinking soup  Ate grilled cheese sandwich   Has lasix to use prn, has not taken since mon      Diarrhea   Associated symptoms include abdominal pain. Pertinent negatives include no chills, coughing or fever.   Fatigue  Associated symptoms include abdominal pain and fatigue. Pertinent negatives include no chest pain, chills, coughing, diaphoresis, fever or rash.   Abdominal Pain  Associated symptoms include diarrhea. Pertinent negatives include no fever.       /60   Pulse 86   Temp 97.9 °F (36.6 °C)   Ht 5' 4" (1.626 m)   Wt 77.6 kg (171 lb 1.2 oz)   SpO2 97%   BMI 29.37 kg/m²     Review of Systems   Constitutional: Positive for fatigue. Negative for activity change, appetite change, chills, diaphoresis, fever and unexpected weight change.   HENT: Negative.    Respiratory: Negative for cough and shortness of breath.    Cardiovascular: Negative for chest pain, palpitations and leg swelling.   Gastrointestinal: Positive for abdominal pain and diarrhea.   Genitourinary: Negative.    Musculoskeletal: Negative.    Skin: Negative for color change, pallor, rash and wound.   Allergic/Immunologic: Negative for immunocompromised state.   Neurological: Negative.  Negative for dizziness and facial asymmetry.   Hematological: Negative for adenopathy. Does not bruise/bleed easily.   Psychiatric/Behavioral: Negative for agitation, behavioral problems and confusion.       Objective:      Physical Exam  Vitals and nursing note reviewed. "   Constitutional:       General: She is not in acute distress.     Appearance: She is well-developed. She is not diaphoretic.   HENT:      Head: Normocephalic and atraumatic.   Cardiovascular:      Rate and Rhythm: Normal rate and regular rhythm.      Heart sounds: Normal heart sounds. No murmur heard.  Pulmonary:      Effort: Pulmonary effort is normal. No respiratory distress.      Breath sounds: Normal breath sounds.   Musculoskeletal:         General: Normal range of motion.   Skin:     General: Skin is warm and dry.      Findings: No rash.   Neurological:      Mental Status: She is alert.   Psychiatric:         Behavior: Behavior normal. Behavior is cooperative.         Thought Content: Thought content normal.         Judgment: Judgment normal.         Assessment:       1. Viral gastroenteritis    2. Type 2 diabetes mellitus without complication, without long-term current use of insulin    3. Mitral valve insufficiency, unspecified etiology        Plan:       Aretha was seen today for diarrhea, fatigue and abdominal pain.    Diagnoses and all orders for this visit:    Viral gastroenteritis    Type 2 diabetes mellitus without complication, without long-term current use of insulin  Stable. a1c controlled with diet, lifestyle    Mitral valve insufficiency, unspecified etiology  Stable. Followed by Dr. Ignacio seo    Other orders  -     ondansetron (ZOFRAN-ODT) 4 MG TbDL; Take 1 tablet (4 mg total) by mouth every 6 (six) hours as needed (nausea).    Follow clear liquid diet guidelines, may progress to bland diet as tolerated.   Zofran as needed for nausea.   Drink a small sips of clear liquids every 15-20 minutes to re-hydrate yourself.  Avoid Imodium because this can trap the virus in your system.   See PCP or go to ER if decreased urination, inability to hold down fluids, if any dizziness or lightheadedness occurs, or if symptoms worsen of fail to improve with treatment.

## 2022-04-27 ENCOUNTER — OFFICE VISIT (OUTPATIENT)
Dept: URGENT CARE | Facility: CLINIC | Age: 77
End: 2022-04-27
Payer: MEDICARE

## 2022-04-27 VITALS
HEART RATE: 80 BPM | SYSTOLIC BLOOD PRESSURE: 138 MMHG | BODY MASS INDEX: 30.73 KG/M2 | RESPIRATION RATE: 16 BRPM | HEIGHT: 64 IN | DIASTOLIC BLOOD PRESSURE: 62 MMHG | WEIGHT: 180 LBS | OXYGEN SATURATION: 100 % | TEMPERATURE: 99 F

## 2022-04-27 DIAGNOSIS — G44.319 ACUTE POST-TRAUMATIC HEADACHE, NOT INTRACTABLE: ICD-10-CM

## 2022-04-27 DIAGNOSIS — S00.83XA TRAUMATIC HEMATOMA OF FOREHEAD, INITIAL ENCOUNTER: Primary | ICD-10-CM

## 2022-04-27 PROCEDURE — 1159F PR MEDICATION LIST DOCUMENTED IN MEDICAL RECORD: ICD-10-PCS | Mod: CPTII,S$GLB,, | Performed by: PHYSICIAN ASSISTANT

## 2022-04-27 PROCEDURE — 1160F PR REVIEW ALL MEDS BY PRESCRIBER/CLIN PHARMACIST DOCUMENTED: ICD-10-PCS | Mod: CPTII,S$GLB,, | Performed by: PHYSICIAN ASSISTANT

## 2022-04-27 PROCEDURE — 1160F RVW MEDS BY RX/DR IN RCRD: CPT | Mod: CPTII,S$GLB,, | Performed by: PHYSICIAN ASSISTANT

## 2022-04-27 PROCEDURE — 99214 OFFICE O/P EST MOD 30 MIN: CPT | Mod: S$GLB,,, | Performed by: PHYSICIAN ASSISTANT

## 2022-04-27 PROCEDURE — 1125F AMNT PAIN NOTED PAIN PRSNT: CPT | Mod: CPTII,S$GLB,, | Performed by: PHYSICIAN ASSISTANT

## 2022-04-27 PROCEDURE — 1125F PR PAIN SEVERITY QUANTIFIED, PAIN PRESENT: ICD-10-PCS | Mod: CPTII,S$GLB,, | Performed by: PHYSICIAN ASSISTANT

## 2022-04-27 PROCEDURE — 3078F PR MOST RECENT DIASTOLIC BLOOD PRESSURE < 80 MM HG: ICD-10-PCS | Mod: CPTII,S$GLB,, | Performed by: PHYSICIAN ASSISTANT

## 2022-04-27 PROCEDURE — 3075F PR MOST RECENT SYSTOLIC BLOOD PRESS GE 130-139MM HG: ICD-10-PCS | Mod: CPTII,S$GLB,, | Performed by: PHYSICIAN ASSISTANT

## 2022-04-27 PROCEDURE — 3078F DIAST BP <80 MM HG: CPT | Mod: CPTII,S$GLB,, | Performed by: PHYSICIAN ASSISTANT

## 2022-04-27 PROCEDURE — 3075F SYST BP GE 130 - 139MM HG: CPT | Mod: CPTII,S$GLB,, | Performed by: PHYSICIAN ASSISTANT

## 2022-04-27 PROCEDURE — 99214 PR OFFICE/OUTPT VISIT, EST, LEVL IV, 30-39 MIN: ICD-10-PCS | Mod: S$GLB,,, | Performed by: PHYSICIAN ASSISTANT

## 2022-04-27 PROCEDURE — 1159F MED LIST DOCD IN RCRD: CPT | Mod: CPTII,S$GLB,, | Performed by: PHYSICIAN ASSISTANT

## 2022-04-27 RX ORDER — ACETAMINOPHEN 500 MG
500 TABLET ORAL
Status: COMPLETED | OUTPATIENT
Start: 2022-04-27 | End: 2022-04-27

## 2022-04-27 RX ADMIN — Medication 500 MG: at 04:04

## 2022-04-27 NOTE — PATIENT INSTRUCTIONS
Headache  - ice hematoma to decrease swelling and pain   - Drink plenty of fluids to remain hydrated.   - You can take Tylenol and Ibuprofen as needed for headache, as long as you don't have any allergies to these medications or medical conditions such as ulcers, liver or kidney disease or blood thinners etc that would prevent you from taking these meds.   - Get plenty rest.   - Slowly change from laying, sitting, and standing positions to help lightheadedness/dizziness.  - Sit or lie down immediately when dizziness/lightheaded to avoid further injury.      - If your condition worsens or fails to improve we recommend that you receive another evaluation at the ER immediately or contact your PCP to discuss your concerns or return here.  -  You must understand that you've received an urgent care treatment only and that you may be released before all your medical problems are known or treated. You the patient will arrange for follouwp care as instructed.      - Watch for increase pain, fever, vomiting, neck pain or mental confusion.      - You must understand that you have received an Urgent Care treatment only and that you may be released before all of your medical problems are known or treated.   - You, the patient, will arrange for follow up care as instructed with your primary care provider or recommended specialist.   - If your condition worsens or fails to improve we recommend that you receive another evaluation at the ER immediately or contact your PCP to discuss your concerns, or return here.   - Please do not drive or make any important decisions for 24 hours if you have received any pain medications, sedatives or mood altering drugs during your visit.    Disclaimer: This document was drafted with the use of a voice recognition device and is likely to have sound alike errors.

## 2022-04-27 NOTE — PROGRESS NOTES
"Subjective:       Patient ID: Aretha Holt is a 76 y.o. female.    Vitals:  height is 5' 4" (1.626 m) and weight is 81.6 kg (180 lb). Her temperature is 99.1 °F (37.3 °C). Her blood pressure is 138/62 and her pulse is 80. Her respiration is 16 and oxygen saturation is 100%.     Chief Complaint: Head Injury    Patient was reaching for something in the top cabinet and something fell and hit her in the head yesterday.  Denies LOC or AMS.  Denies taking a blood thinner.  Reports that she took ibuprofen last night with mild relief.    Head Injury   The incident occurred 12 to 24 hours ago. The injury mechanism was a direct blow. There was no loss of consciousness. There was no blood loss. The quality of the pain is described as throbbing and aching. The pain is at a severity of 8/10. The pain is moderate. The pain has been constant since the injury. Associated symptoms include headaches. Pertinent negatives include no blurred vision, disorientation, memory loss, numbness, tinnitus, vomiting or weakness. She has tried NSAIDs for the symptoms. The treatment provided no relief.       Constitution: Negative for chills, fatigue, fever, generalized weakness and international travel in last 60 days.   HENT: Negative for ear pain, tinnitus, drooling, tongue pain, facial swelling, congestion, sore throat, trouble swallowing and voice change.    Neck: Negative for neck pain, neck stiffness and neck swelling.   Cardiovascular: Negative for chest pain and palpitations.   Eyes: Negative for eye pain, photophobia, vision loss and blurred vision.   Respiratory: Negative for chest tightness, cough, shortness of breath and wheezing.    Gastrointestinal: Negative for abdominal pain, nausea, vomiting, constipation and diarrhea.   Genitourinary: Negative for dysuria and hematuria.   Musculoskeletal: Negative for pain and back pain.   Skin: Negative for rash and bruising.   Neurological: Positive for headaches. Negative for dizziness, " "light-headedness, speech difficulty, disorientation, altered mental status, loss of consciousness, numbness and tingling.   Psychiatric/Behavioral: Negative for altered mental status, disorientation and confusion.       Objective:       Vitals:    04/27/22 1541   BP: 138/62   Pulse: 80   Resp: 16   Temp: 99.1 °F (37.3 °C)   SpO2: 100%   Weight: 81.6 kg (180 lb)   Height: 5' 4" (1.626 m)      Hearing Screening    125Hz 250Hz 500Hz 1000Hz 2000Hz 3000Hz 4000Hz 6000Hz 8000Hz   Right ear:            Left ear:               Visual Acuity Screening    Right eye Left eye Both eyes   Without correction: 20/70 20/100 20/70   With correction:      did not have reading glasses with her today.    Physical Exam   Constitutional: She is oriented to person, place, and time. She appears well-developed.  Non-toxic appearance. She does not appear ill. No distress.   HENT:   Head: Normocephalic. Head is with contusion (see diagram). Head is without raccoon's eyes and without Curtis's sign.       Ears:   Right Ear: Hearing and external ear normal.   Left Ear: Hearing, tympanic membrane and external ear normal.   Nose: Nose normal. No mucosal edema, rhinorrhea or nasal deformity. No epistaxis. Right sinus exhibits no maxillary sinus tenderness and no frontal sinus tenderness. Left sinus exhibits no maxillary sinus tenderness and no frontal sinus tenderness.   Mouth/Throat: Uvula is midline, oropharynx is clear and moist and mucous membranes are normal. No trismus in the jaw. Normal dentition. No uvula swelling. No posterior oropharyngeal erythema.   Eyes: Conjunctivae, EOM and lids are normal. Pupils are equal, round, and reactive to light. Right eye exhibits no discharge. Left eye exhibits no discharge. No scleral icterus.      extraocular movement intact   Neck: Trachea normal and phonation normal. Neck supple. No neck rigidity present.   Cardiovascular: Normal rate, regular rhythm, normal heart sounds and normal pulses. "   Pulmonary/Chest: Effort normal and breath sounds normal. No respiratory distress.   Abdominal: Normal appearance.   Musculoskeletal: Normal range of motion.         General: No deformity. Normal range of motion.   Neurological: She is alert and oriented to person, place, and time. No cranial nerve deficit. She exhibits normal muscle tone. Coordination normal.   Skin: Skin is warm, dry, intact, not diaphoretic, not pale and no rash. Capillary refill takes less than 2 seconds. No bruising   Psychiatric: Her speech is normal and behavior is normal. Mood, judgment and thought content normal.   Nursing note and vitals reviewed.        Assessment:       1. Traumatic hematoma of forehead, initial encounter    2. Acute post-traumatic headache, not intractable          Plan:         Traumatic hematoma of forehead, initial encounter    Acute post-traumatic headache, not intractable  -     acetaminophen tablet 500 mg           Medical Decision Making:   Initial Assessment:   EOM exam within normal limits  Patient has a small contusion/hematoma to the upper forehead without superficial discoloration  Patient reports 8 out of 10 headache  Differential Diagnosis:   Intracranial hemorrhage  Hematoma/contusion  Tension headache  Concussion    Urgent Care Management:  Strict ER precautions discussed with patient  500 mg Tylenol and ice pack given in clinic    Patient denies being on blood thinner    Denies LOC or AMS     Patient Instructions   Headache  - ice hematoma to decrease swelling and pain   - Drink plenty of fluids to remain hydrated.   - You can take Tylenol and Ibuprofen as needed for headache, as long as you don't have any allergies to these medications or medical conditions such as ulcers, liver or kidney disease or blood thinners etc that would prevent you from taking these meds.   - Get plenty rest.   - Slowly change from laying, sitting, and standing positions to help lightheadedness/dizziness.  - Sit or lie down  immediately when dizziness/lightheaded to avoid further injury.      - If your condition worsens or fails to improve we recommend that you receive another evaluation at the ER immediately or contact your PCP to discuss your concerns or return here.  -  You must understand that you've received an urgent care treatment only and that you may be released before all your medical problems are known or treated. You the patient will arrange for follouwp care as instructed.      - Watch for increase pain, fever, vomiting, neck pain or mental confusion.      - You must understand that you have received an Urgent Care treatment only and that you may be released before all of your medical problems are known or treated.   - You, the patient, will arrange for follow up care as instructed with your primary care provider or recommended specialist.   - If your condition worsens or fails to improve we recommend that you receive another evaluation at the ER immediately or contact your PCP to discuss your concerns, or return here.   - Please do not drive or make any important decisions for 24 hours if you have received any pain medications, sedatives or mood altering drugs during your visit.    Disclaimer: This document was drafted with the use of a voice recognition device and is likely to have sound alike errors.

## 2022-07-05 ENCOUNTER — TELEPHONE (OUTPATIENT)
Dept: INTERNAL MEDICINE | Facility: CLINIC | Age: 77
End: 2022-07-05
Payer: MEDICARE

## 2022-07-05 RX ORDER — CLOBETASOL PROPIONATE 0.5 MG/G
CREAM TOPICAL 2 TIMES DAILY
Qty: 15 G | Refills: 2 | Status: CANCELLED | OUTPATIENT
Start: 2022-07-05

## 2022-07-05 NOTE — TELEPHONE ENCOUNTER
----- Message from Enrique Soni sent at 2022  3:34 PM CDT -----  Contact: Aretha Santos needs an refill on her clobetasol propionate eczema cream. (Pharmacy noted it was ) She stated that she is currently out.Please give her a call a back at [phone].  [id number] - [place] - [address] [facility] [place] [address] [place] [address]  Phone: [phone] Fax: [phone]

## 2022-07-05 NOTE — TELEPHONE ENCOUNTER
Pt is asking for a refill on a cream that she uses for Eczema. Her next schedule appointment is on 08/05 for f/u   Meds are pended

## 2022-08-05 ENCOUNTER — OFFICE VISIT (OUTPATIENT)
Dept: INTERNAL MEDICINE | Facility: CLINIC | Age: 77
End: 2022-08-05
Payer: MEDICARE

## 2022-08-05 ENCOUNTER — LAB VISIT (OUTPATIENT)
Dept: LAB | Facility: HOSPITAL | Age: 77
End: 2022-08-05
Attending: FAMILY MEDICINE
Payer: MEDICARE

## 2022-08-05 ENCOUNTER — TELEPHONE (OUTPATIENT)
Dept: OPHTHALMOLOGY | Facility: CLINIC | Age: 77
End: 2022-08-05
Payer: MEDICARE

## 2022-08-05 VITALS
TEMPERATURE: 98 F | SYSTOLIC BLOOD PRESSURE: 128 MMHG | WEIGHT: 174.63 LBS | BODY MASS INDEX: 29.81 KG/M2 | HEART RATE: 66 BPM | HEIGHT: 64 IN | DIASTOLIC BLOOD PRESSURE: 60 MMHG

## 2022-08-05 DIAGNOSIS — I34.0 MITRAL VALVE INSUFFICIENCY, UNSPECIFIED ETIOLOGY: ICD-10-CM

## 2022-08-05 DIAGNOSIS — Z78.0 POSTMENOPAUSAL: ICD-10-CM

## 2022-08-05 DIAGNOSIS — Z11.59 ENCOUNTER FOR HEPATITIS C SCREENING TEST FOR LOW RISK PATIENT: ICD-10-CM

## 2022-08-05 DIAGNOSIS — E11.9 TYPE 2 DIABETES MELLITUS WITHOUT COMPLICATION, WITHOUT LONG-TERM CURRENT USE OF INSULIN: ICD-10-CM

## 2022-08-05 DIAGNOSIS — R73.03 PREDIABETES: ICD-10-CM

## 2022-08-05 DIAGNOSIS — Z00.00 WELLNESS EXAMINATION: Primary | ICD-10-CM

## 2022-08-05 LAB
ALBUMIN/CREAT UR: 5.3 UG/MG (ref 0–30)
CREAT UR-MCNC: 131 MG/DL (ref 15–325)
MICROALBUMIN UR DL<=1MG/L-MCNC: 7 UG/ML

## 2022-08-05 PROCEDURE — 99999 PR PBB SHADOW E&M-EST. PATIENT-LVL III: CPT | Mod: PBBFAC,,, | Performed by: FAMILY MEDICINE

## 2022-08-05 PROCEDURE — 3078F PR MOST RECENT DIASTOLIC BLOOD PRESSURE < 80 MM HG: ICD-10-PCS | Mod: CPTII,S$GLB,, | Performed by: FAMILY MEDICINE

## 2022-08-05 PROCEDURE — 82043 UR ALBUMIN QUANTITATIVE: CPT | Performed by: FAMILY MEDICINE

## 2022-08-05 PROCEDURE — 3078F DIAST BP <80 MM HG: CPT | Mod: CPTII,S$GLB,, | Performed by: FAMILY MEDICINE

## 2022-08-05 PROCEDURE — 3288F FALL RISK ASSESSMENT DOCD: CPT | Mod: CPTII,S$GLB,, | Performed by: FAMILY MEDICINE

## 2022-08-05 PROCEDURE — 82570 ASSAY OF URINE CREATININE: CPT | Performed by: FAMILY MEDICINE

## 2022-08-05 PROCEDURE — 1159F MED LIST DOCD IN RCRD: CPT | Mod: CPTII,S$GLB,, | Performed by: FAMILY MEDICINE

## 2022-08-05 PROCEDURE — 99397 PER PM REEVAL EST PAT 65+ YR: CPT | Mod: GZ,S$GLB,, | Performed by: FAMILY MEDICINE

## 2022-08-05 PROCEDURE — 99397 PR PREVENTIVE VISIT,EST,65 & OVER: ICD-10-PCS | Mod: GZ,S$GLB,, | Performed by: FAMILY MEDICINE

## 2022-08-05 PROCEDURE — 1101F PR PT FALLS ASSESS DOC 0-1 FALLS W/OUT INJ PAST YR: ICD-10-PCS | Mod: CPTII,S$GLB,, | Performed by: FAMILY MEDICINE

## 2022-08-05 PROCEDURE — 3074F PR MOST RECENT SYSTOLIC BLOOD PRESSURE < 130 MM HG: ICD-10-PCS | Mod: CPTII,S$GLB,, | Performed by: FAMILY MEDICINE

## 2022-08-05 PROCEDURE — 3288F PR FALLS RISK ASSESSMENT DOCUMENTED: ICD-10-PCS | Mod: CPTII,S$GLB,, | Performed by: FAMILY MEDICINE

## 2022-08-05 PROCEDURE — 1159F PR MEDICATION LIST DOCUMENTED IN MEDICAL RECORD: ICD-10-PCS | Mod: CPTII,S$GLB,, | Performed by: FAMILY MEDICINE

## 2022-08-05 PROCEDURE — 3074F SYST BP LT 130 MM HG: CPT | Mod: CPTII,S$GLB,, | Performed by: FAMILY MEDICINE

## 2022-08-05 PROCEDURE — 1101F PT FALLS ASSESS-DOCD LE1/YR: CPT | Mod: CPTII,S$GLB,, | Performed by: FAMILY MEDICINE

## 2022-08-05 PROCEDURE — 99999 PR PBB SHADOW E&M-EST. PATIENT-LVL III: ICD-10-PCS | Mod: PBBFAC,,, | Performed by: FAMILY MEDICINE

## 2022-08-05 RX ORDER — CHOLECALCIFEROL (VITAMIN D3) 25 MCG
1000 TABLET ORAL DAILY
Status: ON HOLD | COMMUNITY
End: 2023-03-06 | Stop reason: HOSPADM

## 2022-08-05 RX ORDER — CLOBETASOL PROPIONATE 0.5 MG/G
OINTMENT TOPICAL DAILY PRN
Status: ON HOLD | COMMUNITY
Start: 2022-07-26 | End: 2023-03-06 | Stop reason: HOSPADM

## 2022-08-05 RX ORDER — ZINC GLUCONATE 50 MG
50 TABLET ORAL DAILY
Status: ON HOLD | COMMUNITY
End: 2023-03-06 | Stop reason: HOSPADM

## 2022-08-05 RX ORDER — METFORMIN HYDROCHLORIDE 500 MG/1
500 TABLET ORAL 2 TIMES DAILY WITH MEALS
Qty: 180 TABLET | Refills: 1 | Status: SHIPPED | OUTPATIENT
Start: 2022-08-05 | End: 2023-02-27 | Stop reason: SDUPTHER

## 2022-08-05 RX ORDER — ASCORBIC ACID 500 MG
500 TABLET ORAL DAILY
Status: ON HOLD | COMMUNITY
End: 2023-03-06 | Stop reason: HOSPADM

## 2022-08-05 RX ORDER — FUROSEMIDE 20 MG/1
20 TABLET ORAL DAILY PRN
COMMUNITY
End: 2023-02-27 | Stop reason: SDUPTHER

## 2022-08-05 NOTE — TELEPHONE ENCOUNTER
Called to RS appointment    ----- Message from Rosalinda Soni sent at 8/5/2022  1:36 PM CDT -----  Contact: self  Pt is asking for an return call in reference to apt she has today for 2pm , pt is needing to reschedule please call back .[phone] Thx CJ

## 2022-08-05 NOTE — PROGRESS NOTES
Subjective:       Patient ID: Aretha Holt is a 77 y.o. female.    Chief Complaint: Hypertension    Aretha Holt is a 77 y.o. female and is here for a comprehensive physical exam.    Do you take any herbs or supplements that were not prescribed by a doctor? yes  Are you taking calcium supplements? no  Are you taking aspirin daily? no     History:  Any STD's in the past? none    The following portions of the patient's history were reviewed and updated as appropriate: allergies, current medications, past family history, past medical history, past social history, past surgical history and problem list.    Review of Systems  Do you have pain that bothers you in your daily life? no  Pertinent items are noted in HPI.      2. Patient Counseling:  --Nutrition: Stressed importance of moderation in sodium/caffeine intake, saturated fat and cholesterol, caloric balance.  --Exercise: Stressed the importance of regular exercise.   --Substance Abuse: Discussed cessation/primary prevention of tobacco, alcohol - nonuser   --Sexuality: Discussed sexually transmitted disease.  --Injury prevention: Discussed safety belts, smoke detector.   --Dental health: Discussed dental health.  --Immunizations reviewed.      3. Discussed the patient's BMI.  4. Follow up as needed for acute illness      Review of Systems   Constitutional: Negative for fever.   HENT: Negative for congestion.    Eyes: Positive for visual disturbance. Negative for discharge.   Respiratory: Negative for shortness of breath.    Cardiovascular: Negative for chest pain.   Gastrointestinal: Negative for abdominal pain.   Genitourinary: Negative for difficulty urinating.   Musculoskeletal: Negative for joint swelling.   Neurological: Negative for dizziness.   Psychiatric/Behavioral: Negative for agitation.       Objective:      Physical Exam  Vitals and nursing note reviewed.   Constitutional:       General: She is not in acute distress.     Appearance:  Normal appearance. She is well-developed. She is not diaphoretic.   HENT:      Head: Normocephalic and atraumatic.   Eyes:      General: No scleral icterus.     Conjunctiva/sclera: Conjunctivae normal.   Cardiovascular:      Rate and Rhythm: Normal rate and regular rhythm.   Pulmonary:      Effort: Pulmonary effort is normal. No respiratory distress.      Breath sounds: Normal breath sounds. No wheezing.   Abdominal:      General: There is no distension.      Palpations: Abdomen is soft.      Tenderness: There is no abdominal tenderness. There is no guarding.   Skin:     General: Skin is warm.      Coloration: Skin is not pale.      Findings: No erythema or rash.      Comments: Good turgor   Neurological:      Mental Status: She is alert.   Psychiatric:         Mood and Affect: Mood normal.         Thought Content: Thought content normal.         Assessment:       1. Wellness examination    2. Postmenopausal    3. Type 2 diabetes mellitus without complication, without long-term current use of insulin    4. Prediabetes    5. Encounter for hepatitis C screening test for low risk patient    6. Mitral valve insufficiency, unspecified etiology        Plan:     Problem List Items Addressed This Visit        Cardiac/Vascular    Mitral valve insufficiency    Relevant Orders    Ferritin    Iron and TIBC       Renal/    Postmenopausal    Relevant Orders    DXA Bone Density Spine And Hip    TSH       ID    Encounter for hepatitis C screening test for low risk patient    Relevant Orders    Hepatitis C Antibody       Endocrine    Prediabetes    Relevant Medications    metFORMIN (GLUCOPHAGE) 500 MG tablet    Other Relevant Orders    Microalbumin/Creatinine Ratio, Urine    CBC Auto Differential    Comprehensive Metabolic Panel    Hemoglobin A1C    Ambulatory referral/consult to Optometry    RESOLVED: Type 2 diabetes mellitus without complication (Chronic)    Relevant Medications    metFORMIN (GLUCOPHAGE) 500 MG tablet        Other    Wellness examination - Primary

## 2022-08-08 ENCOUNTER — APPOINTMENT (OUTPATIENT)
Dept: RADIOLOGY | Facility: HOSPITAL | Age: 77
End: 2022-08-08
Attending: FAMILY MEDICINE
Payer: MEDICARE

## 2022-08-08 ENCOUNTER — OFFICE VISIT (OUTPATIENT)
Dept: CARDIOLOGY | Facility: CLINIC | Age: 77
End: 2022-08-08
Payer: MEDICARE

## 2022-08-08 ENCOUNTER — PATIENT MESSAGE (OUTPATIENT)
Dept: CARDIOLOGY | Facility: CLINIC | Age: 77
End: 2022-08-08

## 2022-08-08 VITALS
BODY MASS INDEX: 29.7 KG/M2 | HEIGHT: 64 IN | WEIGHT: 173.94 LBS | OXYGEN SATURATION: 95 % | SYSTOLIC BLOOD PRESSURE: 138 MMHG | HEART RATE: 66 BPM | DIASTOLIC BLOOD PRESSURE: 70 MMHG

## 2022-08-08 DIAGNOSIS — I34.0 MITRAL VALVE INSUFFICIENCY, UNSPECIFIED ETIOLOGY: Primary | ICD-10-CM

## 2022-08-08 DIAGNOSIS — R60.0 BILATERAL EDEMA OF LOWER EXTREMITY: ICD-10-CM

## 2022-08-08 DIAGNOSIS — F41.9 ANXIETY: ICD-10-CM

## 2022-08-08 DIAGNOSIS — K21.9 GASTROESOPHAGEAL REFLUX DISEASE, UNSPECIFIED WHETHER ESOPHAGITIS PRESENT: ICD-10-CM

## 2022-08-08 DIAGNOSIS — R73.03 PREDIABETES: ICD-10-CM

## 2022-08-08 DIAGNOSIS — Z78.0 POSTMENOPAUSAL: ICD-10-CM

## 2022-08-08 DIAGNOSIS — R07.89 OTHER CHEST PAIN: ICD-10-CM

## 2022-08-08 PROCEDURE — 1159F PR MEDICATION LIST DOCUMENTED IN MEDICAL RECORD: ICD-10-PCS | Mod: CPTII,S$GLB,, | Performed by: INTERNAL MEDICINE

## 2022-08-08 PROCEDURE — 3075F PR MOST RECENT SYSTOLIC BLOOD PRESS GE 130-139MM HG: ICD-10-PCS | Mod: CPTII,S$GLB,, | Performed by: INTERNAL MEDICINE

## 2022-08-08 PROCEDURE — 1126F PR PAIN SEVERITY QUANTIFIED, NO PAIN PRESENT: ICD-10-PCS | Mod: CPTII,S$GLB,, | Performed by: INTERNAL MEDICINE

## 2022-08-08 PROCEDURE — 1101F PR PT FALLS ASSESS DOC 0-1 FALLS W/OUT INJ PAST YR: ICD-10-PCS | Mod: CPTII,S$GLB,, | Performed by: INTERNAL MEDICINE

## 2022-08-08 PROCEDURE — 99999 PR PBB SHADOW E&M-EST. PATIENT-LVL III: CPT | Mod: PBBFAC,,, | Performed by: INTERNAL MEDICINE

## 2022-08-08 PROCEDURE — 1126F AMNT PAIN NOTED NONE PRSNT: CPT | Mod: CPTII,S$GLB,, | Performed by: INTERNAL MEDICINE

## 2022-08-08 PROCEDURE — 3078F PR MOST RECENT DIASTOLIC BLOOD PRESSURE < 80 MM HG: ICD-10-PCS | Mod: CPTII,S$GLB,, | Performed by: INTERNAL MEDICINE

## 2022-08-08 PROCEDURE — 1160F RVW MEDS BY RX/DR IN RCRD: CPT | Mod: CPTII,S$GLB,, | Performed by: INTERNAL MEDICINE

## 2022-08-08 PROCEDURE — 3075F SYST BP GE 130 - 139MM HG: CPT | Mod: CPTII,S$GLB,, | Performed by: INTERNAL MEDICINE

## 2022-08-08 PROCEDURE — 3078F DIAST BP <80 MM HG: CPT | Mod: CPTII,S$GLB,, | Performed by: INTERNAL MEDICINE

## 2022-08-08 PROCEDURE — 99214 OFFICE O/P EST MOD 30 MIN: CPT | Mod: S$GLB,,, | Performed by: INTERNAL MEDICINE

## 2022-08-08 PROCEDURE — 77080 DXA BONE DENSITY AXIAL: CPT | Mod: TC

## 2022-08-08 PROCEDURE — 99999 PR PBB SHADOW E&M-EST. PATIENT-LVL III: ICD-10-PCS | Mod: PBBFAC,,, | Performed by: INTERNAL MEDICINE

## 2022-08-08 PROCEDURE — 3288F PR FALLS RISK ASSESSMENT DOCUMENTED: ICD-10-PCS | Mod: CPTII,S$GLB,, | Performed by: INTERNAL MEDICINE

## 2022-08-08 PROCEDURE — 77080 DEXA BONE DENSITY SPINE HIP: ICD-10-PCS | Mod: 26,,, | Performed by: RADIOLOGY

## 2022-08-08 PROCEDURE — 99214 PR OFFICE/OUTPT VISIT, EST, LEVL IV, 30-39 MIN: ICD-10-PCS | Mod: S$GLB,,, | Performed by: INTERNAL MEDICINE

## 2022-08-08 PROCEDURE — 1159F MED LIST DOCD IN RCRD: CPT | Mod: CPTII,S$GLB,, | Performed by: INTERNAL MEDICINE

## 2022-08-08 PROCEDURE — 1160F PR REVIEW ALL MEDS BY PRESCRIBER/CLIN PHARMACIST DOCUMENTED: ICD-10-PCS | Mod: CPTII,S$GLB,, | Performed by: INTERNAL MEDICINE

## 2022-08-08 PROCEDURE — 77080 DXA BONE DENSITY AXIAL: CPT | Mod: 26,,, | Performed by: RADIOLOGY

## 2022-08-08 PROCEDURE — 1101F PT FALLS ASSESS-DOCD LE1/YR: CPT | Mod: CPTII,S$GLB,, | Performed by: INTERNAL MEDICINE

## 2022-08-08 PROCEDURE — 3288F FALL RISK ASSESSMENT DOCD: CPT | Mod: CPTII,S$GLB,, | Performed by: INTERNAL MEDICINE

## 2022-08-08 RX ORDER — PANTOPRAZOLE SODIUM 40 MG/1
40 TABLET, DELAYED RELEASE ORAL DAILY
Qty: 30 TABLET | Refills: 3 | Status: SHIPPED | OUTPATIENT
Start: 2022-08-08 | End: 2022-11-03 | Stop reason: SDUPTHER
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
## 2022-08-09 ENCOUNTER — OFFICE VISIT (OUTPATIENT)
Dept: OPHTHALMOLOGY | Facility: CLINIC | Age: 77
End: 2022-08-09
Payer: MEDICARE

## 2022-08-09 DIAGNOSIS — E11.9 TYPE 2 DIABETES MELLITUS WITHOUT COMPLICATION, WITHOUT LONG-TERM CURRENT USE OF INSULIN: ICD-10-CM

## 2022-08-09 DIAGNOSIS — H25.813 COMBINED FORM OF AGE-RELATED CATARACT, BOTH EYES: ICD-10-CM

## 2022-08-09 DIAGNOSIS — H53.143 ASTHENOPIA OF BOTH EYES: Primary | ICD-10-CM

## 2022-08-09 DIAGNOSIS — R73.03 PREDIABETES: ICD-10-CM

## 2022-08-09 DIAGNOSIS — H16.223 KERATOCONJUNCTIVITIS SICCA DUE TO DECREASED TEAR PRODUCTION, BILATERAL: ICD-10-CM

## 2022-08-09 PROCEDURE — 99203 PR OFFICE/OUTPT VISIT, NEW, LEVL III, 30-44 MIN: ICD-10-PCS | Mod: S$GLB,,, | Performed by: OPTOMETRIST

## 2022-08-09 PROCEDURE — 1159F PR MEDICATION LIST DOCUMENTED IN MEDICAL RECORD: ICD-10-PCS | Mod: CPTII,S$GLB,, | Performed by: OPTOMETRIST

## 2022-08-09 PROCEDURE — 1159F MED LIST DOCD IN RCRD: CPT | Mod: CPTII,S$GLB,, | Performed by: OPTOMETRIST

## 2022-08-09 PROCEDURE — 99999 PR PBB SHADOW E&M-EST. PATIENT-LVL II: CPT | Mod: PBBFAC,,, | Performed by: OPTOMETRIST

## 2022-08-09 PROCEDURE — 99203 OFFICE O/P NEW LOW 30 MIN: CPT | Mod: S$GLB,,, | Performed by: OPTOMETRIST

## 2022-08-09 PROCEDURE — 99499 RISK ADDL DX/OHS AUDIT: ICD-10-PCS | Mod: S$GLB,,, | Performed by: OPTOMETRIST

## 2022-08-09 PROCEDURE — 1160F RVW MEDS BY RX/DR IN RCRD: CPT | Mod: CPTII,S$GLB,, | Performed by: OPTOMETRIST

## 2022-08-09 PROCEDURE — 99999 PR PBB SHADOW E&M-EST. PATIENT-LVL II: ICD-10-PCS | Mod: PBBFAC,,, | Performed by: OPTOMETRIST

## 2022-08-09 PROCEDURE — 99499 UNLISTED E&M SERVICE: CPT | Mod: S$GLB,,, | Performed by: OPTOMETRIST

## 2022-08-09 PROCEDURE — 1160F PR REVIEW ALL MEDS BY PRESCRIBER/CLIN PHARMACIST DOCUMENTED: ICD-10-PCS | Mod: CPTII,S$GLB,, | Performed by: OPTOMETRIST

## 2022-08-09 NOTE — PROGRESS NOTES
HPI     NP to DKT  Last seen by ABR on 2/7/22  Patient here today for eye pain almost like straining  Pain is rated at 6 today  Symptoms have been present for about 3-4 months  Wears glasses full-time      Last edited by Ingrid Corbin, PCT on 8/9/2022 11:07 AM. (History)            Assessment /Plan     For exam results, see Encounter Report.    Asthenopia of both eyes  Separate MRx provided today for reading only.   Eyeglass Final Rx     Eyeglass Final Rx       Sphere Cylinder    Right +4.00 DS    Left +4.00 DS    Type: SVL-Reading only    Expiration Date: 8/9/2023              Trial frame performed in office shows improvement in asthenopia and eye pain. Recommend updated reading glasses PRN.     Type 2 diabetes mellitus without complication, without long-term current use of insulin  No retinopathy, next DFE 2/2023    Combined form of age-related cataract, both eyes  NVS observe.     Keratoconjunctivitis Sicca due to decreased tear production  Discussed warm compresses twice daily and lid hygiene, with preservative free artificial tears instillation four times daily. Patient to return to clinic if no improvement in symptoms with current treatment.     RTC as scheduled for annual DFE with CEE sooner if any changes to vision or worsening symptoms.

## 2022-10-03 ENCOUNTER — HOSPITAL ENCOUNTER (OUTPATIENT)
Dept: RADIOLOGY | Facility: HOSPITAL | Age: 77
Discharge: HOME | End: 2022-10-03
Attending: INTERNAL MEDICINE
Payer: MEDICARE

## 2022-10-03 ENCOUNTER — HOSPITAL ENCOUNTER (OUTPATIENT)
Dept: CARDIOLOGY | Facility: HOSPITAL | Age: 77
Discharge: HOME | End: 2022-10-03
Attending: INTERNAL MEDICINE
Payer: MEDICARE

## 2022-10-03 DIAGNOSIS — R73.03 PREDIABETES: ICD-10-CM

## 2022-10-03 DIAGNOSIS — F41.9 ANXIETY: ICD-10-CM

## 2022-10-03 DIAGNOSIS — R07.89 OTHER CHEST PAIN: ICD-10-CM

## 2022-10-03 DIAGNOSIS — I34.0 MITRAL VALVE INSUFFICIENCY, UNSPECIFIED ETIOLOGY: ICD-10-CM

## 2022-10-03 DIAGNOSIS — K21.9 GASTROESOPHAGEAL REFLUX DISEASE, UNSPECIFIED WHETHER ESOPHAGITIS PRESENT: ICD-10-CM

## 2022-10-03 LAB
CV STRESS BASE HR: 65 BPM
DIASTOLIC BLOOD PRESSURE: 66 MMHG
NUC REST EJECTION FRACTION: 83
NUC STRESS EJECTION FRACTION: 76 %
OHS CV CPX 85 PERCENT MAX PREDICTED HEART RATE MALE: 118
OHS CV CPX MAX PREDICTED HEART RATE: 138
OHS CV CPX PATIENT IS FEMALE: 1
OHS CV CPX PATIENT IS MALE: 0
OHS CV CPX PEAK DIASTOLIC BLOOD PRESSURE: 56 MMHG
OHS CV CPX PEAK HEAR RATE: 109 BPM
OHS CV CPX PEAK RATE PRESSURE PRODUCT: NORMAL
OHS CV CPX PEAK SYSTOLIC BLOOD PRESSURE: 131 MMHG
OHS CV CPX PERCENT MAX PREDICTED HEART RATE ACHIEVED: 79
OHS CV CPX RATE PRESSURE PRODUCT PRESENTING: 9100
STRESS ECHO POST EXERCISE DUR MIN: 1 MINUTES
STRESS ECHO POST EXERCISE DUR SEC: 9 SECONDS
SYSTOLIC BLOOD PRESSURE: 140 MMHG

## 2022-10-03 PROCEDURE — 93018 STRESS TEST WITH MYOCARDIAL PERFUSION (CUPID ONLY): ICD-10-PCS | Mod: ,,, | Performed by: INTERNAL MEDICINE

## 2022-10-03 PROCEDURE — 93016 CV STRESS TEST SUPVJ ONLY: CPT | Mod: ,,, | Performed by: INTERNAL MEDICINE

## 2022-10-03 PROCEDURE — 93016 STRESS TEST WITH MYOCARDIAL PERFUSION (CUPID ONLY): ICD-10-PCS | Mod: ,,, | Performed by: INTERNAL MEDICINE

## 2022-10-03 PROCEDURE — 93017 CV STRESS TEST TRACING ONLY: CPT

## 2022-10-03 PROCEDURE — 78452 HT MUSCLE IMAGE SPECT MULT: CPT | Mod: 26,,, | Performed by: INTERNAL MEDICINE

## 2022-10-03 PROCEDURE — A9502 TC99M TETROFOSMIN: HCPCS

## 2022-10-03 PROCEDURE — 93018 CV STRESS TEST I&R ONLY: CPT | Mod: ,,, | Performed by: INTERNAL MEDICINE

## 2022-10-03 PROCEDURE — 78452 STRESS TEST WITH MYOCARDIAL PERFUSION (CUPID ONLY): ICD-10-PCS | Mod: 26,,, | Performed by: INTERNAL MEDICINE

## 2022-10-03 RX ORDER — REGADENOSON 0.08 MG/ML
0.4 INJECTION, SOLUTION INTRAVENOUS ONCE
Status: DISCONTINUED | OUTPATIENT
Start: 2022-10-03 | End: 2023-03-06 | Stop reason: HOSPADM

## 2022-11-03 DIAGNOSIS — K21.9 GASTROESOPHAGEAL REFLUX DISEASE, UNSPECIFIED WHETHER ESOPHAGITIS PRESENT: Primary | ICD-10-CM

## 2022-11-03 RX ORDER — PANTOPRAZOLE SODIUM 40 MG/1
40 TABLET, DELAYED RELEASE ORAL DAILY
Qty: 30 TABLET | Refills: 3 | Status: SHIPPED | OUTPATIENT
Start: 2022-11-03 | End: 2023-02-27 | Stop reason: SDUPTHER

## 2022-11-07 PROBLEM — Z00.00 WELLNESS EXAMINATION: Status: RESOLVED | Noted: 2022-08-05 | Resolved: 2022-11-07

## 2022-11-30 DIAGNOSIS — R07.89 OTHER CHEST PAIN: Primary | ICD-10-CM

## 2022-12-05 ENCOUNTER — TELEPHONE (OUTPATIENT)
Dept: INTERNAL MEDICINE | Facility: CLINIC | Age: 77
End: 2022-12-05
Payer: MEDICARE

## 2022-12-05 NOTE — TELEPHONE ENCOUNTER
----- Message from Gely Ramon sent at 12/5/2022  2:15 PM CST -----  Contact: Patient  Type:  Sooner Appointment Request      Name of Caller:Patient   When is the first available appointment?12/27/2022  Symptoms:knee swollen / pain   Would the patient rather a call back or a response via [facility]? Call back   Best Call Back Number:[phone]  Additional Information: Please assist

## 2022-12-05 NOTE — TELEPHONE ENCOUNTER
Dr Rivas is out until 12/08/2022. I made pt an appt with Oliva for 12/06/2022 . Pt is aware and will daylin for the appointment

## 2022-12-06 ENCOUNTER — OFFICE VISIT (OUTPATIENT)
Dept: INTERNAL MEDICINE | Facility: CLINIC | Age: 77
End: 2022-12-06
Payer: MEDICARE

## 2022-12-06 ENCOUNTER — LAB VISIT (OUTPATIENT)
Dept: LAB | Facility: HOSPITAL | Age: 77
End: 2022-12-06
Attending: FAMILY MEDICINE
Payer: MEDICARE

## 2022-12-06 VITALS
OXYGEN SATURATION: 98 % | DIASTOLIC BLOOD PRESSURE: 68 MMHG | TEMPERATURE: 98 F | SYSTOLIC BLOOD PRESSURE: 118 MMHG | BODY MASS INDEX: 30.6 KG/M2 | HEIGHT: 64 IN | HEART RATE: 92 BPM | WEIGHT: 179.25 LBS

## 2022-12-06 DIAGNOSIS — K21.9 GASTROESOPHAGEAL REFLUX DISEASE, UNSPECIFIED WHETHER ESOPHAGITIS PRESENT: ICD-10-CM

## 2022-12-06 DIAGNOSIS — R11.0 NAUSEA: Primary | ICD-10-CM

## 2022-12-06 DIAGNOSIS — R60.0 BILATERAL EDEMA OF LOWER EXTREMITY: ICD-10-CM

## 2022-12-06 DIAGNOSIS — M25.532 PAIN IN LEFT WRIST: ICD-10-CM

## 2022-12-06 DIAGNOSIS — R53.1 WEAKNESS: ICD-10-CM

## 2022-12-06 LAB
ANION GAP SERPL CALC-SCNC: 8 MMOL/L (ref 8–16)
BILIRUB SERPL-MCNC: NORMAL MG/DL
BLOOD URINE, POC: NORMAL
BUN SERPL-MCNC: 12 MG/DL (ref 8–23)
CALCIUM SERPL-MCNC: 9.6 MG/DL (ref 8.7–10.5)
CHLORIDE SERPL-SCNC: 107 MMOL/L (ref 95–110)
CLARITY, POC UA: CLEAR
CO2 SERPL-SCNC: 25 MMOL/L (ref 23–29)
COLOR, POC UA: YELLOW
CREAT SERPL-MCNC: 0.8 MG/DL (ref 0.5–1.4)
EST. GFR  (NO RACE VARIABLE): >60 ML/MIN/1.73 M^2
GLUCOSE SERPL-MCNC: 104 MG/DL (ref 70–110)
GLUCOSE UR QL STRIP: NORMAL
KETONES UR QL STRIP: NORMAL
LEUKOCYTE ESTERASE URINE, POC: NORMAL
NITRITE, POC UA: NORMAL
PH, POC UA: 5
POTASSIUM SERPL-SCNC: 4.2 MMOL/L (ref 3.5–5.1)
PROTEIN, POC: NORMAL
SODIUM SERPL-SCNC: 140 MMOL/L (ref 136–145)
SPECIFIC GRAVITY, POC UA: 1.02
UROBILINOGEN, POC UA: NORMAL

## 2022-12-06 PROCEDURE — 1159F PR MEDICATION LIST DOCUMENTED IN MEDICAL RECORD: ICD-10-PCS | Mod: CPTII,S$GLB,, | Performed by: NURSE PRACTITIONER

## 2022-12-06 PROCEDURE — 1101F PT FALLS ASSESS-DOCD LE1/YR: CPT | Mod: CPTII,S$GLB,, | Performed by: NURSE PRACTITIONER

## 2022-12-06 PROCEDURE — 81002 URINALYSIS NONAUTO W/O SCOPE: CPT | Mod: S$GLB,,, | Performed by: NURSE PRACTITIONER

## 2022-12-06 PROCEDURE — 3288F FALL RISK ASSESSMENT DOCD: CPT | Mod: CPTII,S$GLB,, | Performed by: NURSE PRACTITIONER

## 2022-12-06 PROCEDURE — 85025 COMPLETE CBC W/AUTO DIFF WBC: CPT | Performed by: NURSE PRACTITIONER

## 2022-12-06 PROCEDURE — 99999 PR PBB SHADOW E&M-EST. PATIENT-LVL IV: CPT | Mod: PBBFAC,,, | Performed by: NURSE PRACTITIONER

## 2022-12-06 PROCEDURE — 99999 PR PBB SHADOW E&M-EST. PATIENT-LVL IV: ICD-10-PCS | Mod: PBBFAC,,, | Performed by: NURSE PRACTITIONER

## 2022-12-06 PROCEDURE — 81002 POCT URINE DIPSTICK WITHOUT MICROSCOPE: ICD-10-PCS | Mod: S$GLB,,, | Performed by: NURSE PRACTITIONER

## 2022-12-06 PROCEDURE — 3078F DIAST BP <80 MM HG: CPT | Mod: CPTII,S$GLB,, | Performed by: NURSE PRACTITIONER

## 2022-12-06 PROCEDURE — 3074F PR MOST RECENT SYSTOLIC BLOOD PRESSURE < 130 MM HG: ICD-10-PCS | Mod: CPTII,S$GLB,, | Performed by: NURSE PRACTITIONER

## 2022-12-06 PROCEDURE — 3078F PR MOST RECENT DIASTOLIC BLOOD PRESSURE < 80 MM HG: ICD-10-PCS | Mod: CPTII,S$GLB,, | Performed by: NURSE PRACTITIONER

## 2022-12-06 PROCEDURE — 1160F PR REVIEW ALL MEDS BY PRESCRIBER/CLIN PHARMACIST DOCUMENTED: ICD-10-PCS | Mod: CPTII,S$GLB,, | Performed by: NURSE PRACTITIONER

## 2022-12-06 PROCEDURE — 3074F SYST BP LT 130 MM HG: CPT | Mod: CPTII,S$GLB,, | Performed by: NURSE PRACTITIONER

## 2022-12-06 PROCEDURE — 80048 BASIC METABOLIC PNL TOTAL CA: CPT | Performed by: NURSE PRACTITIONER

## 2022-12-06 PROCEDURE — 99214 PR OFFICE/OUTPT VISIT, EST, LEVL IV, 30-39 MIN: ICD-10-PCS | Mod: S$GLB,,, | Performed by: NURSE PRACTITIONER

## 2022-12-06 PROCEDURE — 1101F PR PT FALLS ASSESS DOC 0-1 FALLS W/OUT INJ PAST YR: ICD-10-PCS | Mod: CPTII,S$GLB,, | Performed by: NURSE PRACTITIONER

## 2022-12-06 PROCEDURE — 99214 OFFICE O/P EST MOD 30 MIN: CPT | Mod: S$GLB,,, | Performed by: NURSE PRACTITIONER

## 2022-12-06 PROCEDURE — 1159F MED LIST DOCD IN RCRD: CPT | Mod: CPTII,S$GLB,, | Performed by: NURSE PRACTITIONER

## 2022-12-06 PROCEDURE — 1160F RVW MEDS BY RX/DR IN RCRD: CPT | Mod: CPTII,S$GLB,, | Performed by: NURSE PRACTITIONER

## 2022-12-06 PROCEDURE — 36415 COLL VENOUS BLD VENIPUNCTURE: CPT | Mod: PO | Performed by: NURSE PRACTITIONER

## 2022-12-06 PROCEDURE — 3288F PR FALLS RISK ASSESSMENT DOCUMENTED: ICD-10-PCS | Mod: CPTII,S$GLB,, | Performed by: NURSE PRACTITIONER

## 2022-12-06 RX ORDER — DICLOFENAC SODIUM 10 MG/G
2 GEL TOPICAL 4 TIMES DAILY PRN
Qty: 100 G | Refills: 1 | Status: SHIPPED | OUTPATIENT
Start: 2022-12-06 | End: 2024-03-26

## 2022-12-06 NOTE — PROGRESS NOTES
"Subjective:       Patient ID: Aretha Holt is a 77 y.o. female.    Chief Complaint: Knee Pain (s), Joint Swelling, and Extremity Weakness    Pt presents to clinic today for knee pain and fatigue   Reports she has had extreme fatigue and nausea for the past month or 2   She has a hard time explaining what exactly is going on but just stays she is so tired and weak  Denies an  symptoms  No shortness of breath or chest pain  Taking her meds as prescribed with the exception of missing her metformin a few times  No URI symptoms  Does have some reflux type symptoms for which she takes Protonix for   Denies any bloody stools   Still eating and drinking    Also complaining of pain the the left wrist  Has been going on for a while as well  Told she had arthritis in the past       /68 (Patient Position: Standing)   Pulse 92   Temp 98.2 °F (36.8 °C)   Ht 5' 4" (1.626 m)   Wt 81.3 kg (179 lb 3.7 oz)   SpO2 98%   BMI 30.77 kg/m²     Review of Systems   Constitutional:  Positive for appetite change and fatigue. Negative for activity change, chills, diaphoresis, fever and unexpected weight change.   HENT: Negative.     Respiratory:  Negative for cough and shortness of breath.    Cardiovascular:  Negative for chest pain, palpitations and leg swelling.   Gastrointestinal: Negative.    Genitourinary: Negative.    Musculoskeletal:  Positive for arthralgias and joint swelling.   Skin:  Negative for color change, pallor, rash and wound.   Allergic/Immunologic: Negative for immunocompromised state.   Neurological: Negative.  Negative for dizziness and facial asymmetry.   Hematological:  Negative for adenopathy. Does not bruise/bleed easily.   Psychiatric/Behavioral:  Negative for agitation, behavioral problems and confusion.      Objective:      Physical Exam  Vitals and nursing note reviewed.   Constitutional:       General: She is not in acute distress.     Appearance: She is well-developed. She is not diaphoretic. "   HENT:      Head: Normocephalic and atraumatic.      Mouth/Throat:      Pharynx: No oropharyngeal exudate.   Eyes:      General:         Right eye: No discharge.         Left eye: No discharge.      Conjunctiva/sclera: Conjunctivae normal.   Cardiovascular:      Rate and Rhythm: Normal rate and regular rhythm.      Heart sounds: Normal heart sounds. No murmur heard.  Pulmonary:      Effort: Pulmonary effort is normal. No respiratory distress.      Breath sounds: Normal breath sounds. No wheezing.   Abdominal:      General: There is no distension.      Palpations: Abdomen is soft.   Musculoskeletal:         General: No tenderness. Normal range of motion.   Skin:     General: Skin is warm and dry.      Findings: No erythema or rash.   Neurological:      Mental Status: She is alert and oriented to person, place, and time.   Psychiatric:         Behavior: Behavior normal.         Thought Content: Thought content normal.         Judgment: Judgment normal.       Assessment:       1. Nausea    2. Gastroesophageal reflux disease, unspecified whether esophagitis present    3. Bilateral edema of lower extremity    4. Weakness    5. Pain in left wrist        Plan:       Aretha was seen today for knee pain, joint swelling and extremity weakness.    Diagnoses and all orders for this visit:    Nausea  -     POCT URINE DIPSTICK WITHOUT MICROSCOPE  - Continue protonix     Gastroesophageal reflux disease, unspecified whether esophagitis present        - Chronic, bland diet/gerd diet discussed. Continue protonix   Bilateral edema of lower extremity        - Elevated legs when home, lower salt in diet, lasix PRN   Weakness  -     Basic Metabolic Panel; Future  -     CBC Auto Differential; Future    Pain in left wrist  -     diclofenac sodium (VOLTAREN) 1 % Gel; Apply 2 g topically 4 (four) times daily as needed (pain). For muscular pain  - Voltaren gel prn       Discussed with pt concerns over dehydration but pt feels she is able to  hydrate herself  Advised ER for urgent evaluation but pt declined  UA neg in clinic today, orthostatics stable  Will draw BMP and CBC and have pt close follow up with PCP   Strict ED precautions discussed.

## 2022-12-07 LAB
BASOPHILS # BLD AUTO: 0.04 K/UL (ref 0–0.2)
BASOPHILS NFR BLD: 0.5 % (ref 0–1.9)
DIFFERENTIAL METHOD: ABNORMAL
EOSINOPHIL # BLD AUTO: 0.2 K/UL (ref 0–0.5)
EOSINOPHIL NFR BLD: 2.3 % (ref 0–8)
ERYTHROCYTE [DISTWIDTH] IN BLOOD BY AUTOMATED COUNT: 14.5 % (ref 11.5–14.5)
HCT VFR BLD AUTO: 41.9 % (ref 37–48.5)
HGB BLD-MCNC: 12.8 G/DL (ref 12–16)
IMM GRANULOCYTES # BLD AUTO: 0.02 K/UL (ref 0–0.04)
IMM GRANULOCYTES NFR BLD AUTO: 0.3 % (ref 0–0.5)
LYMPHOCYTES # BLD AUTO: 2.6 K/UL (ref 1–4.8)
LYMPHOCYTES NFR BLD: 32.6 % (ref 18–48)
MCH RBC QN AUTO: 29.8 PG (ref 27–31)
MCHC RBC AUTO-ENTMCNC: 30.5 G/DL (ref 32–36)
MCV RBC AUTO: 97 FL (ref 82–98)
MONOCYTES # BLD AUTO: 0.6 K/UL (ref 0.3–1)
MONOCYTES NFR BLD: 7.9 % (ref 4–15)
NEUTROPHILS # BLD AUTO: 4.5 K/UL (ref 1.8–7.7)
NEUTROPHILS NFR BLD: 56.4 % (ref 38–73)
NRBC BLD-RTO: 0 /100 WBC
PLATELET # BLD AUTO: 331 K/UL (ref 150–450)
PMV BLD AUTO: 9.2 FL (ref 9.2–12.9)
RBC # BLD AUTO: 4.3 M/UL (ref 4–5.4)
WBC # BLD AUTO: 7.95 K/UL (ref 3.9–12.7)

## 2022-12-09 ENCOUNTER — TELEPHONE (OUTPATIENT)
Dept: INTERNAL MEDICINE | Facility: CLINIC | Age: 77
End: 2022-12-09
Payer: MEDICARE

## 2022-12-09 NOTE — TELEPHONE ENCOUNTER
Spoke w/ pt, informed her of results and Oliva's recommendations.     Pt verbalized understanding.

## 2022-12-09 NOTE — TELEPHONE ENCOUNTER
----- Message from Edelmira Arboleda sent at 12/8/2022  4:45 PM CST -----  Contact: Aretha  Who Called: Aretha  Does the patient know what this is regarding?: Patient needing to know results as she is still feeling weak and unwell  Would the patient rather a call back or a response via [employer]? Call  Best Call Back Number: [phone]  Additional Information:

## 2022-12-30 ENCOUNTER — TELEPHONE (OUTPATIENT)
Dept: INTERNAL MEDICINE | Facility: CLINIC | Age: 77
End: 2022-12-30
Payer: MEDICARE

## 2022-12-30 NOTE — TELEPHONE ENCOUNTER
----- Message from Jhonny Rivas MD sent at 12/30/2022  4:29 PM CST -----  I dont know anyone there.    ----- Message -----  From: Юлия Collado LPN  Sent: 12/29/2022   2:05 PM CST  To: Jhonny Rivas MD    Pt would like recommendation for PCP in [facility]  ----- Message -----  From: Jessica Grace  Sent: 12/29/2022  12:07 PM CST  To: Evelyn Barry Staff    Pt relocated to the [facility] and would like a recommendation from Dr. Rivas. She would like the names of Primary Care Doctors in that area. Call back number is .[phone]. Thx. EL

## 2023-01-06 ENCOUNTER — NURSE TRIAGE (OUTPATIENT)
Dept: ADMINISTRATIVE | Facility: CLINIC | Age: 78
End: 2023-01-06
Payer: MEDICARE

## 2023-01-06 NOTE — TELEPHONE ENCOUNTER
Reason for Disposition   Message left on identified voicemail    Additional Information   Negative: Caller has already spoken with the PCP (or office), and has no further questions   Negative: Caller has already spoken with another triager and has no further questions   Negative: Caller has already spoken with another triager or PCP (or office), and has further questions and triager able to answer questions.   Negative: Busy signal.  First attempt to contact caller.  Follow-up call scheduled within 15 minutes.   Negative: No answer.  First attempt to contact caller.  Follow-up call scheduled within 15 minutes.    Protocols used: No Contact or Duplicate Contact Call-A-OH  LM x 2 at 430pm at [phone]

## 2023-01-19 ENCOUNTER — HOSPITAL ENCOUNTER (EMERGENCY)
Facility: HOSPITAL | Age: 78
Discharge: HOME | End: 2023-01-19
Attending: EMERGENCY MEDICINE
Payer: MEDICARE

## 2023-01-19 VITALS
TEMPERATURE: 98 F | HEART RATE: 57 BPM | DIASTOLIC BLOOD PRESSURE: 67 MMHG | BODY MASS INDEX: 27.46 KG/M2 | OXYGEN SATURATION: 99 % | WEIGHT: 160 LBS | SYSTOLIC BLOOD PRESSURE: 154 MMHG | RESPIRATION RATE: 25 BRPM

## 2023-01-19 DIAGNOSIS — M25.569 KNEE PAIN, UNSPECIFIED CHRONICITY, UNSPECIFIED LATERALITY: ICD-10-CM

## 2023-01-19 DIAGNOSIS — M79.89 LEG SWELLING: Primary | ICD-10-CM

## 2023-01-19 DIAGNOSIS — R51.9 NONINTRACTABLE HEADACHE, UNSPECIFIED CHRONICITY PATTERN, UNSPECIFIED HEADACHE TYPE: ICD-10-CM

## 2023-01-19 LAB
ALBUMIN SERPL BCP-MCNC: 3.9 G/DL (ref 3.5–5.2)
ALP SERPL-CCNC: 123 U/L (ref 55–135)
ALT SERPL W/O P-5'-P-CCNC: 13 U/L (ref 10–44)
ANION GAP SERPL CALC-SCNC: 10 MMOL/L (ref 8–16)
AST SERPL-CCNC: 18 U/L (ref 10–40)
BASOPHILS # BLD AUTO: 0.03 K/UL (ref 0–0.2)
BASOPHILS NFR BLD: 0.4 % (ref 0–1.9)
BILIRUB SERPL-MCNC: 0.4 MG/DL (ref 0.1–1)
BNP SERPL-MCNC: 46 PG/ML (ref 0–99)
BUN SERPL-MCNC: 11 MG/DL (ref 8–23)
CALCIUM SERPL-MCNC: 9 MG/DL (ref 8.7–10.5)
CHLORIDE SERPL-SCNC: 111 MMOL/L (ref 95–110)
CO2 SERPL-SCNC: 23 MMOL/L (ref 23–29)
CREAT SERPL-MCNC: 0.9 MG/DL (ref 0.5–1.4)
DIFFERENTIAL METHOD: ABNORMAL
EOSINOPHIL # BLD AUTO: 0.2 K/UL (ref 0–0.5)
EOSINOPHIL NFR BLD: 1.8 % (ref 0–8)
ERYTHROCYTE [DISTWIDTH] IN BLOOD BY AUTOMATED COUNT: 13.6 % (ref 11.5–14.5)
EST. GFR  (NO RACE VARIABLE): >60 ML/MIN/1.73 M^2
GLUCOSE SERPL-MCNC: 94 MG/DL (ref 70–110)
HCT VFR BLD AUTO: 39.8 % (ref 37–48.5)
HGB BLD-MCNC: 12.9 G/DL (ref 12–16)
IMM GRANULOCYTES # BLD AUTO: 0.03 K/UL (ref 0–0.04)
IMM GRANULOCYTES NFR BLD AUTO: 0.4 % (ref 0–0.5)
LYMPHOCYTES # BLD AUTO: 3 K/UL (ref 1–4.8)
LYMPHOCYTES NFR BLD: 36.2 % (ref 18–48)
MCH RBC QN AUTO: 30.1 PG (ref 27–31)
MCHC RBC AUTO-ENTMCNC: 32.4 G/DL (ref 32–36)
MCV RBC AUTO: 93 FL (ref 82–98)
MONOCYTES # BLD AUTO: 0.6 K/UL (ref 0.3–1)
MONOCYTES NFR BLD: 7.5 % (ref 4–15)
NEUTROPHILS # BLD AUTO: 4.4 K/UL (ref 1.8–7.7)
NEUTROPHILS NFR BLD: 53.7 % (ref 38–73)
NRBC BLD-RTO: 0 /100 WBC
PLATELET # BLD AUTO: 254 K/UL (ref 150–450)
PMV BLD AUTO: 8.9 FL (ref 9.2–12.9)
POTASSIUM SERPL-SCNC: 4.2 MMOL/L (ref 3.5–5.1)
PROT SERPL-MCNC: 6.9 G/DL (ref 6–8.4)
RBC # BLD AUTO: 4.29 M/UL (ref 4–5.4)
SODIUM SERPL-SCNC: 144 MMOL/L (ref 136–145)
TROPONIN I SERPL DL<=0.01 NG/ML-MCNC: <0.006 NG/ML (ref 0–0.03)
WBC # BLD AUTO: 8.23 K/UL (ref 3.9–12.7)

## 2023-01-19 PROCEDURE — 93010 EKG 12-LEAD: ICD-10-PCS | Mod: ,,, | Performed by: INTERNAL MEDICINE

## 2023-01-19 PROCEDURE — 93005 ELECTROCARDIOGRAM TRACING: CPT

## 2023-01-19 PROCEDURE — 84484 ASSAY OF TROPONIN QUANT: CPT | Performed by: PHYSICIAN ASSISTANT

## 2023-01-19 PROCEDURE — 99285 EMERGENCY DEPT VISIT HI MDM: CPT | Mod: 25

## 2023-01-19 PROCEDURE — 80053 COMPREHEN METABOLIC PANEL: CPT | Performed by: PHYSICIAN ASSISTANT

## 2023-01-19 PROCEDURE — 25000003 PHARM REV CODE 250

## 2023-01-19 PROCEDURE — 85025 COMPLETE CBC W/AUTO DIFF WBC: CPT | Performed by: PHYSICIAN ASSISTANT

## 2023-01-19 PROCEDURE — 83880 ASSAY OF NATRIURETIC PEPTIDE: CPT | Performed by: PHYSICIAN ASSISTANT

## 2023-01-19 PROCEDURE — 93010 ELECTROCARDIOGRAM REPORT: CPT | Mod: ,,, | Performed by: INTERNAL MEDICINE

## 2023-01-19 RX ORDER — ACETAMINOPHEN 325 MG/1
650 TABLET ORAL
Status: COMPLETED | OUTPATIENT
Start: 2023-01-19 | End: 2023-01-19

## 2023-01-19 RX ADMIN — ACETAMINOPHEN 650 MG: 325 TABLET ORAL at 08:01

## 2023-01-19 NOTE — FIRST PROVIDER EVALUATION
Emergency Department TeleTriage Encounter Note      CHIEF COMPLAINT    Chief Complaint   Patient presents with    Leg Swelling     Bilateral leg swelling. Pt denies any trauma. Pt states that she does not have any hx that would warrant leg swelling out of the blue. Pt is complaining of head pain.        VITAL SIGNS   Initial Vitals [01/19/23 1504]   BP Pulse Resp Temp SpO2   (!) 148/69 98 18 98.6 °F (37 °C) 100 %      MAP       --            ALLERGIES    Review of patient's allergies indicates:   Allergen Reactions    Decadron [dexamethasone] Other (See Comments)     Syncope (causes spike in blood glucose with previous bout of syncope)    Celestone [betamethasone sodium phosphate]     Celestone [betamethasone] Other (See Comments)     Syncope (causes spike in blood glucose with previous bout of syncope)    Decadron-la        PROVIDER TRIAGE NOTE  Patient presents with complaint of bilateral leg swelling right greater than left.  She reports associated pain.  She reports history of similar symptoms in the past for which she was given a topical cream.  Intermittent shortness of breath.  Denies chest pain.  She states she took ibuprofen with no significant improvement.      Phy:   Constitutional: well nourished, well developed, appearing stated age, NAD   HEENT: NCAT, symmetrical lids, No obvious facial deformity.  Normal phonation. Normal Conjunctiva   Neck: NAROM   Respiratory: Normal effort.  No obvious use of accessory muscles   Musculoskeletal: Moved upper extremities well   Neuro: Alert, answers questions appropriately    Psych: appropriate mood and affect      Initial orders will be placed and care will be transferred to an alternate provider when patient is roomed for a full evaluation. Any additional orders and the final disposition will be determined by that provider.        ORDERS  Labs Reviewed - No data to display    ED Orders (720h ago, onward)      None              Virtual Visit Note: The provider  triage portion of this emergency department evaluation and documentation was performed via [employer], a HIPAA-compliant telemedicine application, in concert with a tele-presenter in the room. A face to face patient evaluation with one of my colleagues will occur once the patient is placed in an emergency department room.      DISCLAIMER: This note was prepared with [employer] voice recognition transcription software. Garbled syntax, mangled pronouns, and other bizarre constructions may be attributed to that software system.

## 2023-01-20 ENCOUNTER — OFFICE VISIT (OUTPATIENT)
Dept: INTERNAL MEDICINE | Facility: CLINIC | Age: 78
End: 2023-01-20
Payer: MEDICARE

## 2023-01-20 VITALS
TEMPERATURE: 98 F | WEIGHT: 184.31 LBS | HEIGHT: 64 IN | BODY MASS INDEX: 31.47 KG/M2 | SYSTOLIC BLOOD PRESSURE: 130 MMHG | DIASTOLIC BLOOD PRESSURE: 70 MMHG

## 2023-01-20 DIAGNOSIS — G89.29 CHRONIC PAIN OF BOTH KNEES: ICD-10-CM

## 2023-01-20 DIAGNOSIS — M25.561 CHRONIC PAIN OF BOTH KNEES: ICD-10-CM

## 2023-01-20 DIAGNOSIS — M25.562 CHRONIC PAIN OF BOTH KNEES: ICD-10-CM

## 2023-01-20 DIAGNOSIS — R51.9 NONINTRACTABLE HEADACHE, UNSPECIFIED CHRONICITY PATTERN, UNSPECIFIED HEADACHE TYPE: ICD-10-CM

## 2023-01-20 DIAGNOSIS — R60.0 BILATERAL EDEMA OF LOWER EXTREMITY: Primary | ICD-10-CM

## 2023-01-20 PROCEDURE — 3075F PR MOST RECENT SYSTOLIC BLOOD PRESS GE 130-139MM HG: ICD-10-PCS | Mod: CPTII,S$GLB,, | Performed by: NURSE PRACTITIONER

## 2023-01-20 PROCEDURE — 1159F PR MEDICATION LIST DOCUMENTED IN MEDICAL RECORD: ICD-10-PCS | Mod: CPTII,S$GLB,, | Performed by: NURSE PRACTITIONER

## 2023-01-20 PROCEDURE — 99213 PR OFFICE/OUTPT VISIT, EST, LEVL III, 20-29 MIN: ICD-10-PCS | Mod: S$GLB,,, | Performed by: NURSE PRACTITIONER

## 2023-01-20 PROCEDURE — 1160F PR REVIEW ALL MEDS BY PRESCRIBER/CLIN PHARMACIST DOCUMENTED: ICD-10-PCS | Mod: CPTII,S$GLB,, | Performed by: NURSE PRACTITIONER

## 2023-01-20 PROCEDURE — 99213 OFFICE O/P EST LOW 20 MIN: CPT | Mod: S$GLB,,, | Performed by: NURSE PRACTITIONER

## 2023-01-20 PROCEDURE — 3075F SYST BP GE 130 - 139MM HG: CPT | Mod: CPTII,S$GLB,, | Performed by: NURSE PRACTITIONER

## 2023-01-20 PROCEDURE — 1160F RVW MEDS BY RX/DR IN RCRD: CPT | Mod: CPTII,S$GLB,, | Performed by: NURSE PRACTITIONER

## 2023-01-20 PROCEDURE — 99999 PR PBB SHADOW E&M-EST. PATIENT-LVL III: ICD-10-PCS | Mod: PBBFAC,,, | Performed by: NURSE PRACTITIONER

## 2023-01-20 PROCEDURE — 1159F MED LIST DOCD IN RCRD: CPT | Mod: CPTII,S$GLB,, | Performed by: NURSE PRACTITIONER

## 2023-01-20 PROCEDURE — 3078F PR MOST RECENT DIASTOLIC BLOOD PRESSURE < 80 MM HG: ICD-10-PCS | Mod: CPTII,S$GLB,, | Performed by: NURSE PRACTITIONER

## 2023-01-20 PROCEDURE — 99999 PR PBB SHADOW E&M-EST. PATIENT-LVL III: CPT | Mod: PBBFAC,,, | Performed by: NURSE PRACTITIONER

## 2023-01-20 PROCEDURE — 3078F DIAST BP <80 MM HG: CPT | Mod: CPTII,S$GLB,, | Performed by: NURSE PRACTITIONER

## 2023-01-20 NOTE — ED PROVIDER NOTES
Encounter Date: 1/19/2023       History     Chief Complaint   Patient presents with    Leg Swelling     Bilateral leg swelling. Pt denies any trauma. Pt states that she does not have any hx that would warrant leg swelling out of the blue. Pt is complaining of head pain.      Patient is a 77-year-old female who presented emergency department for evaluation of bilateral lower extremity edema and headache.  Patient reports that she is had previous lower extreme laying in his dealing with arthritis pain but reports that over the past week she is had severe swelling.  She reports that she is elevating her legs with mild improvement in symptoms.  She reports that she was seen a month ago and was given Voltaren but the pain is severe.  States that the pain is in her knees and shins.  Patient also notes that starting yesterday she began having a headache.  She notes that it started last night in bed and has persistent.  She reports that she is not previously had headaches in the past and this 1 is extremely painful.  She states that she took Motrin for the headache and helped improve her pain temporarily but it is still persistent.  Patient denies any history of smoking, recent long distance travel, prior history of DVT or PE.  Patient reports that she has previously been prescribed Lasix as needed for swelling but has not taken it recently.    The history is provided by the patient.   Review of patient's allergies indicates:   Allergen Reactions    Decadron [dexamethasone] Other (See Comments)     Syncope (causes spike in blood glucose with previous bout of syncope)    Celestone [betamethasone sodium phosphate]     Celestone [betamethasone] Other (See Comments)     Syncope (causes spike in blood glucose with previous bout of syncope)    Decadron-la      Past Medical History:   Diagnosis Date    Cystitis 6/13/2021    Diabetes mellitus     Type 2 diabetes mellitus without complication 6/13/2021     Past Surgical History:    Procedure Laterality Date     SECTION      CHOLECYSTECTOMY      CHOLECYSTECTOMY       Family History   Problem Relation Age of Onset    Diabetes Mother     No Known Problems Father      Social History     Tobacco Use    Smoking status: Former     Types: Cigarettes     Quit date: 6/10/2014     Years since quittin.6    Smokeless tobacco: Never   Substance Use Topics    Alcohol use: No    Drug use: No     Review of Systems   HENT:  Negative for congestion.    Eyes:  Negative for visual disturbance.   Respiratory:  Negative for cough, chest tightness and shortness of breath.    Cardiovascular:  Positive for leg swelling.        Bilateral lower extremity edema   Gastrointestinal:  Negative for abdominal pain and vomiting.   Genitourinary:  Negative for decreased urine volume and difficulty urinating.   Musculoskeletal:  Positive for arthralgias and myalgias. Negative for neck pain and neck stiffness.   Skin:  Negative for color change.   Neurological:  Positive for headaches. Negative for dizziness, syncope, weakness and light-headedness.     Physical Exam     Initial Vitals [23 1504]   BP Pulse Resp Temp SpO2   (!) 148/69 98 18 98.6 °F (37 °C) 100 %      MAP       --         Physical Exam    Nursing note and vitals reviewed.  Constitutional: She appears well-developed and well-nourished.   HENT:   Head: Normocephalic and atraumatic.   Eyes: EOM are normal. Pupils are equal, round, and reactive to light.   Neck: Neck supple.   Normal range of motion.  Cardiovascular:  Normal rate, regular rhythm and intact distal pulses.           Pulmonary/Chest: Breath sounds normal. No respiratory distress.   Abdominal: Abdomen is soft. Bowel sounds are normal. She exhibits no distension.   Musculoskeletal:         General: Edema present. No tenderness. Normal range of motion.      Cervical back: Normal range of motion and neck supple.      Comments: Tenderness in the bilateral lower extremities.  Bilateral knee  pain with extension and flexion of the knee  No bursa or area of fluctuance or erythema noted  Calf is nontender nonerythematous     Neurological: She is alert and oriented to person, place, and time. She has normal strength and normal reflexes. GCS score is 15. GCS eye subscore is 4. GCS verbal subscore is 5. GCS motor subscore is 6.   Skin: Skin is warm and dry. Capillary refill takes less than 2 seconds. No erythema.       ED Course   Procedures  Labs Reviewed   CBC W/ AUTO DIFFERENTIAL - Abnormal; Notable for the following components:       Result Value    MPV 8.9 (*)     All other components within normal limits   COMPREHENSIVE METABOLIC PANEL - Abnormal; Notable for the following components:    Chloride 111 (*)     All other components within normal limits   TROPONIN I   B-TYPE NATRIURETIC PEPTIDE          Imaging Results              CT Head Without Contrast (Final result)  Result time 01/19/23 20:44:26      Final result by Jhonny Thomas MD (01/19/23 20:44:26)                   Impression:      1. 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      Electronically signed by: Jhonny Thomas MD  Date:    01/19/2023  Time:    20:44               Narrative:    EXAMINATION:  CT HEAD WITHOUT CONTRAST    CLINICAL HISTORY:  Headache, new or worsening (Age >= 50y);    TECHNIQUE:  Low dose axial images were obtained through the head.  Coronal and sagittal reformations were also performed. Contrast was not administered.    COMPARISON:  06/12/2021    FINDINGS:  There is generalized cerebral volume loss.  There is hypoattenuation in a periventricular fashion, likely sequela of chronic microvascular ischemic change.  There are a few punctate foci of low attenuation within the bilateral basal ganglia, stable.  There is no evidence of acute major vascular territory infarct, hemorrhage, or mass.  There is no  hydrocephalus.  There are no abnormal extra-axial fluid collections.  The paranasal sinuses and mastoid air cells are clear, and there is no evidence of calvarial fracture.  The visualized soft tissues are unremarkable.                                       X-Ray Chest AP Portable (Final result)  Result time 01/19/23 17:47:04      Final result by Jhonny Thomas MD (01/19/23 17:47:04)                   Impression:      1. Questionable trace right pleural effusion versus pleural thickening/atelectasis, no large focal consolidation.      Electronically signed by: Jhonny Thomas MD  Date:    01/19/2023  Time:    17:47               Narrative:    EXAMINATION:  XR CHEST AP PORTABLE    CLINICAL HISTORY:  CHF;    TECHNIQUE:  Single frontal view of the chest was performed.    COMPARISON:  06/12/2021    FINDINGS:  The cardiomediastinal silhouette is not enlarged noting calcification of the aorta..  There is obscuration of the right costophrenic angle, may reflect trace effusion versus atelectasis..  The trachea is midline.  The lungs are symmetrically expanded bilaterally without evidence of acute parenchymal process. No large focal consolidation seen.  There is no pneumothorax.  The osseous structures are remarkable for degenerative changes and osteopenia..                                       Medications   acetaminophen tablet 650 mg (650 mg Oral Given 1/19/23 2012)     Medical Decision Making:   Initial Assessment:   27-year-old female who presents to the emergency department for bilateral lower extremity edema and pain.  At times patient is alert oriented in no acute distress.  Patient is afebrile, vitals within normal limits.  Differential Diagnosis:   Musculoskeletal  Arthritis  Venous insufficiency    Clinical Tests:   Lab Tests: Ordered and Reviewed  Radiological Study: Ordered and Reviewed  Medical Tests: Ordered and Reviewed  ED Management:  77-year-old female presents to the emergency department for lower  extremity pain and edema.  Time assessment patient is alert and oriented in no acute distress.  Patient has been dealing with this chronic issue and reports that has worsened over the past week.  Low suspicion for DVT as this time given presentation and bilateral nature with low risk factors.  Patient has no history of CHF.  Chest x-ray obtained which reported maybe questionable were effusion however not significant.  EKG obtained which displayed normal sinus rhythm and BNP and troponin were both within normal limits and negative.  Patient has no complaints of shortness of breath or chest pain at this time.     CBC, CMP obtained evaluate for leukocytosis, anemia, metabolic derangements, renal impairment hepatic dysfunction.  Labs within normal limits and stable from prior.  At the time assessment patient was also complaining of new onset headache that started last night.  Patient reports that she is never had headache like this before in his started last night while reading a book and was very sudden.  She reports mild relief after Motrin but persisted.  Low suspicion for intracranial hemorrhage however given age and description of headache CT head obtained.  CT head displayed possible remote infarct microvascular changes.  Patient does not have any symptoms and headache resolved after Tylenol.  No signs of acute hemorrhage at this time.  Discussed with patient results and discussed discharge.  At this time patient is stable for discharge and return precautions discussed and understood.  Patient was instructed to follow up with her primary care physician to review results read at this time patient is agreeable with plan.  Return precautions discussed and understood.          Attending Attestation:   Physician Attestation Statement for Resident:  As the supervising MD   Physician Attestation Statement: I have personally seen and examined this patient.   I agree with the above history.  -: Agrees; 77-year-old female  presents to emergency department complaining of bilateral lower extremity swelling and headache   As the supervising MD I agree with the above PE.   -: Agree;   As the supervising MD I agree with the above treatment, course, plan, and disposition.   -: Agree; patient managed symptomatically, instructed on home management, instructed to have her primary care physician review her CT and lab results, given strict return precautions.   I have reviewed and agree with the residents interpretation of the following: lab data, x-rays and CT scans.  I have reviewed the following: old records at this facility.                           Clinical Impression:   Final diagnoses:  [M79.89] Leg swelling (Primary)  [M25.569] Knee pain, unspecified chronicity, unspecified laterality  [R51.9] Nonintractable headache, unspecified chronicity pattern, unspecified headache type        ED Disposition Condition    Discharge Stable          ED Prescriptions    None       Follow-up Information       Follow up With Specialties Details Why Contact Info    Jhonny Rivas MD Family Medicine Go to  for previously Scheduled appointment [address] [facility] [address] [phone]      [facility] Emergency Dept Emergency Medicine Go to  If symptoms worsen [address] [facility] [id number] [phone]             Karie Bond MD  Resident  01/19/23 5342       Alf Whitmore MD  01/20/23 0355

## 2023-01-20 NOTE — PROGRESS NOTES
Subjective:       Patient ID: Aretha Holt is a 77 y.o. female.    Chief Complaint: Follow-up (From hospital visit)    Pt presents to clinic today for hospital follow up  Pt was seen yesterday afternoon in the ER for headache and leg swelling  Headaches started about 1 week ago  She describes it as sharp shooting pains in her head  Relieved by tylenol or motrin    Leg swelling is an ongoing issue  Pt enjoys snacks such as popcorn  Thinks this is causing some of her swelling  Relieved when she elevates her leg       ER note:  77-year-old female presents to the emergency department for lower extremity pain and edema.  Time assessment patient is alert and oriented in no acute distress.  Patient has been dealing with this chronic issue and reports that has worsened over the past week.  Low suspicion for DVT as this time given presentation and bilateral nature with low risk factors.  Patient has no history of CHF.  Chest x-ray obtained which reported maybe questionable were effusion however not significant.  EKG obtained which displayed normal sinus rhythm and BNP and troponin were both within normal limits and negative.  Patient has no complaints of shortness of breath or chest pain at this time.      CBC, CMP obtained evaluate for leukocytosis, anemia, metabolic derangements, renal impairment hepatic dysfunction.  Labs within normal limits and stable from prior.  At the time assessment patient was also complaining of new onset headache that started last night.  Patient reports that she is never had headache like this before in his started last night while reading a book and was very sudden.  She reports mild relief after Motrin but persisted.  Low suspicion for intracranial hemorrhage however given age and description of headache CT head obtained.  CT head displayed possible remote infarct microvascular changes.  Patient does not have any symptoms and headache resolved after Tylenol.  No signs of acute  "hemorrhage at this time.  Discussed with patient results and discussed discharge.  At this time patient is stable for discharge and return precautions discussed and understood.  Patient was instructed to follow up with her primary care physician to review results read at this time patient is agreeable with plan.  Return precautions discussed and understood.      /70   Temp 97.9 °F (36.6 °C) (Oral)   Ht 5' 4" (1.626 m)   Wt 83.6 kg (184 lb 4.9 oz)   BMI 31.64 kg/m²     Review of Systems   Constitutional:  Negative for activity change, appetite change, chills, diaphoresis, fatigue, fever and unexpected weight change.   HENT: Negative.     Respiratory:  Negative for cough and shortness of breath.    Cardiovascular:  Positive for leg swelling. Negative for chest pain and palpitations.   Gastrointestinal: Negative.    Genitourinary: Negative.    Musculoskeletal:  Positive for arthralgias, back pain and joint swelling.   Skin:  Negative for color change, pallor, rash and wound.   Allergic/Immunologic: Negative for immunocompromised state.   Neurological:  Positive for headaches. Negative for dizziness and facial asymmetry.   Hematological:  Negative for adenopathy. Does not bruise/bleed easily.   Psychiatric/Behavioral:  Negative for agitation, behavioral problems and confusion.      Objective:      Physical Exam  Vitals and nursing note reviewed.   Constitutional:       General: She is not in acute distress.     Appearance: Normal appearance. She is well-developed. She is not diaphoretic.   HENT:      Head: Normocephalic and atraumatic.   Cardiovascular:      Rate and Rhythm: Normal rate and regular rhythm.      Heart sounds: Normal heart sounds. No murmur heard.  Pulmonary:      Effort: Pulmonary effort is normal. No respiratory distress.      Breath sounds: Normal breath sounds.   Musculoskeletal:         General: Normal range of motion.      Right lower leg: Edema present.      Left lower leg: Edema present. "   Skin:     General: Skin is warm and dry.      Findings: No rash.   Neurological:      Mental Status: She is alert.   Psychiatric:         Mood and Affect: Mood normal.         Behavior: Behavior normal. Behavior is cooperative.         Thought Content: Thought content normal.         Judgment: Judgment normal.       Assessment:       1. Bilateral edema of lower extremity    2. Nonintractable headache, unspecified chronicity pattern, unspecified headache type    3. Chronic pain of both knees        Plan:       Aretha was seen today for follow-up.    Diagnoses and all orders for this visit:    Bilateral edema of lower extremity       - Chronic, watch salt In diet, compression socks, elevate legs     Nonintractable headache, unspecified chronicity pattern, unspecified headache type        - Warning signs discussed. Tylenol PRN. Keep headache diary, increase water intake    Chronic pain of both knees       - Chronic, Voltaren gel PRN pain    Reviewed ER stay with pt.   Discussed changes on CT and possible MRI/neurology if no improvement or worsening in her condition  Strict BP control advised  Red flags to return to ER discussed  Advised close follow up with PCP in the next 1-2 weeks  Follow up for worsening or no improvement in symptoms and PRN.

## 2023-01-20 NOTE — DISCHARGE INSTRUCTIONS
Return to emergency department if you develop shortness of breath, difficulty breathing, difficulty walking, dizziness, Mental status changes, nausea vomiting unable tolerate oral intake or any other new or concerning symptoms.

## 2023-02-25 PROBLEM — Z28.9 DELAYED IMMUNIZATIONS: Status: ACTIVE | Noted: 2023-02-25

## 2023-02-25 PROBLEM — Z01.00 ROUTINE EYE EXAM: Status: ACTIVE | Noted: 2023-02-25

## 2023-02-25 PROBLEM — E11.9 TYPE 2 DIABETES MELLITUS WITHOUT COMPLICATION, WITHOUT LONG-TERM CURRENT USE OF INSULIN: Status: ACTIVE | Noted: 2023-02-25

## 2023-02-25 PROBLEM — E11.9 TYPE 2 DIABETES MELLITUS WITHOUT COMPLICATION, WITHOUT LONG-TERM CURRENT USE OF INSULIN: Status: ACTIVE | Noted: 2021-06-13

## 2023-02-25 NOTE — PROGRESS NOTES
Subjective:       Patient ID: Aretha Holt is a 77 y.o. female.    Chief Complaint: Diabetes    Diabetes  She presents for her follow-up diabetic visit. She has type 2 diabetes mellitus. Her disease course has been stable. Pertinent negatives for diabetes include no blurred vision and no chest pain. Symptoms are stable.   Review of Systems   Eyes:  Negative for blurred vision.   Respiratory:  Negative for shortness of breath.    Cardiovascular:  Negative for chest pain.   Gastrointestinal:  Negative for abdominal pain.     Objective:      Physical Exam  Vitals and nursing note reviewed.   Constitutional:       General: She is not in acute distress.     Appearance: Normal appearance. She is well-developed. She is not diaphoretic.   HENT:      Head: Normocephalic and atraumatic.   Pulmonary:      Effort: Pulmonary effort is normal. No respiratory distress.      Breath sounds: Normal breath sounds. No wheezing.   Musculoskeletal:      Right lower leg: Edema present.      Left lower leg: Edema present.   Skin:     General: Skin is warm and dry.      Findings: No erythema or rash.   Neurological:      Mental Status: She is alert.       Assessment:       1. Type 2 diabetes mellitus without complication, without long-term current use of insulin    2. Routine eye exam    3. Delayed immunizations    4. Mitral valve insufficiency, unspecified etiology    5. Prediabetes    6. Gastroesophageal reflux disease, unspecified whether esophagitis present    7. Arthritis of both knees          Plan:     Problem List Items Addressed This Visit          Ophtho    Routine eye exam       Cardiac/Vascular    Mitral valve insufficiency    Overview     Chronic, Stable, cont lasix         Relevant Orders    B-TYPE NATRIURETIC PEPTIDE       ID    Delayed immunizations       Endocrine    Type 2 diabetes mellitus without complication, without long-term current use of insulin - Primary    Overview     Chronic, Stable, cont metformin          Relevant Medications    metFORMIN (GLUCOPHAGE) 500 MG tablet    Other Relevant Orders    Hemoglobin A1C    Lipid Panel    Microalbumin/Creatinine Ratio, Urine    Comprehensive Metabolic Panel    Ambulatory referral/consult to Podiatry    Ambulatory referral/consult to Optometry    (In Office Administered) Pneumococcal Conjugate Vaccine (20 Valent) (IM)       GI    Gastroesophageal reflux disease    Relevant Medications    pantoprazole (PROTONIX) 40 MG tablet       Orthopedic    Arthritis of both knees    Relevant Orders    X-ray Knee Ortho Bilateral with Flexion     Other Visit Diagnoses       Prediabetes        Relevant Medications    metFORMIN (GLUCOPHAGE) 500 MG tablet

## 2023-02-27 ENCOUNTER — ANCILLARY ORDERS (OUTPATIENT)
Dept: INTERNAL MEDICINE | Facility: CLINIC | Age: 78
End: 2023-02-27
Payer: MEDICARE

## 2023-02-27 ENCOUNTER — OFFICE VISIT (OUTPATIENT)
Dept: INTERNAL MEDICINE | Facility: CLINIC | Age: 78
End: 2023-02-27
Payer: MEDICARE

## 2023-02-27 ENCOUNTER — OFFICE VISIT (OUTPATIENT)
Dept: CARDIOLOGY | Facility: CLINIC | Age: 78
End: 2023-02-27
Payer: MEDICARE

## 2023-02-27 ENCOUNTER — HOSPITAL ENCOUNTER (OUTPATIENT)
Dept: RADIOLOGY | Facility: HOSPITAL | Age: 78
Discharge: HOME | End: 2023-02-27
Attending: FAMILY MEDICINE
Payer: MEDICARE

## 2023-02-27 VITALS
DIASTOLIC BLOOD PRESSURE: 78 MMHG | HEART RATE: 64 BPM | WEIGHT: 185.19 LBS | SYSTOLIC BLOOD PRESSURE: 138 MMHG | BODY MASS INDEX: 31.79 KG/M2

## 2023-02-27 VITALS
TEMPERATURE: 98 F | OXYGEN SATURATION: 100 % | HEART RATE: 63 BPM | BODY MASS INDEX: 31.79 KG/M2 | DIASTOLIC BLOOD PRESSURE: 78 MMHG | WEIGHT: 185.19 LBS | SYSTOLIC BLOOD PRESSURE: 138 MMHG

## 2023-02-27 DIAGNOSIS — E11.9 TYPE 2 DIABETES MELLITUS WITHOUT COMPLICATION, WITHOUT LONG-TERM CURRENT USE OF INSULIN: Primary | ICD-10-CM

## 2023-02-27 DIAGNOSIS — K21.9 GASTROESOPHAGEAL REFLUX DISEASE, UNSPECIFIED WHETHER ESOPHAGITIS PRESENT: ICD-10-CM

## 2023-02-27 DIAGNOSIS — R73.03 PREDIABETES: ICD-10-CM

## 2023-02-27 DIAGNOSIS — I34.0 MITRAL VALVE INSUFFICIENCY, UNSPECIFIED ETIOLOGY: ICD-10-CM

## 2023-02-27 DIAGNOSIS — R07.89 OTHER CHEST PAIN: Primary | ICD-10-CM

## 2023-02-27 DIAGNOSIS — F41.9 ANXIETY: ICD-10-CM

## 2023-02-27 DIAGNOSIS — Z28.9 DELAYED IMMUNIZATIONS: ICD-10-CM

## 2023-02-27 DIAGNOSIS — M17.0 ARTHRITIS OF BOTH KNEES: ICD-10-CM

## 2023-02-27 DIAGNOSIS — R60.0 BILATERAL EDEMA OF LOWER EXTREMITY: ICD-10-CM

## 2023-02-27 DIAGNOSIS — G89.29 CHRONIC PAIN OF BOTH KNEES: ICD-10-CM

## 2023-02-27 DIAGNOSIS — Z01.00 ROUTINE EYE EXAM: ICD-10-CM

## 2023-02-27 DIAGNOSIS — M25.562 CHRONIC PAIN OF BOTH KNEES: ICD-10-CM

## 2023-02-27 DIAGNOSIS — M25.561 CHRONIC PAIN OF BOTH KNEES: ICD-10-CM

## 2023-02-27 PROCEDURE — 73562 X-RAY EXAM OF KNEE 3: CPT | Mod: 26,50,, | Performed by: RADIOLOGY

## 2023-02-27 PROCEDURE — 99214 OFFICE O/P EST MOD 30 MIN: CPT | Mod: S$GLB,,, | Performed by: INTERNAL MEDICINE

## 2023-02-27 PROCEDURE — 3075F SYST BP GE 130 - 139MM HG: CPT | Mod: CPTII,S$GLB,, | Performed by: INTERNAL MEDICINE

## 2023-02-27 PROCEDURE — 73562 X-RAY EXAM OF KNEE 3: CPT | Mod: TC,50,FY,PO

## 2023-02-27 PROCEDURE — 3288F PR FALLS RISK ASSESSMENT DOCUMENTED: ICD-10-PCS | Mod: CPTII,S$GLB,, | Performed by: FAMILY MEDICINE

## 2023-02-27 PROCEDURE — 99999 PR PBB SHADOW E&M-EST. PATIENT-LVL IV: ICD-10-PCS | Mod: PBBFAC,,, | Performed by: FAMILY MEDICINE

## 2023-02-27 PROCEDURE — 3288F FALL RISK ASSESSMENT DOCD: CPT | Mod: CPTII,S$GLB,, | Performed by: INTERNAL MEDICINE

## 2023-02-27 PROCEDURE — 1101F PT FALLS ASSESS-DOCD LE1/YR: CPT | Mod: CPTII,S$GLB,, | Performed by: INTERNAL MEDICINE

## 2023-02-27 PROCEDURE — 99999 PR PBB SHADOW E&M-EST. PATIENT-LVL III: ICD-10-PCS | Mod: PBBFAC,,, | Performed by: INTERNAL MEDICINE

## 2023-02-27 PROCEDURE — 1101F PR PT FALLS ASSESS DOC 0-1 FALLS W/OUT INJ PAST YR: ICD-10-PCS | Mod: CPTII,S$GLB,, | Performed by: FAMILY MEDICINE

## 2023-02-27 PROCEDURE — 3288F PR FALLS RISK ASSESSMENT DOCUMENTED: ICD-10-PCS | Mod: CPTII,S$GLB,, | Performed by: INTERNAL MEDICINE

## 2023-02-27 PROCEDURE — 1125F AMNT PAIN NOTED PAIN PRSNT: CPT | Mod: CPTII,S$GLB,, | Performed by: FAMILY MEDICINE

## 2023-02-27 PROCEDURE — 3075F PR MOST RECENT SYSTOLIC BLOOD PRESS GE 130-139MM HG: ICD-10-PCS | Mod: CPTII,S$GLB,, | Performed by: FAMILY MEDICINE

## 2023-02-27 PROCEDURE — 73562 XR KNEE ORTHO BILAT: ICD-10-PCS | Mod: 26,50,, | Performed by: RADIOLOGY

## 2023-02-27 PROCEDURE — 1101F PR PT FALLS ASSESS DOC 0-1 FALLS W/OUT INJ PAST YR: ICD-10-PCS | Mod: CPTII,S$GLB,, | Performed by: INTERNAL MEDICINE

## 2023-02-27 PROCEDURE — 1160F PR REVIEW ALL MEDS BY PRESCRIBER/CLIN PHARMACIST DOCUMENTED: ICD-10-PCS | Mod: CPTII,S$GLB,, | Performed by: INTERNAL MEDICINE

## 2023-02-27 PROCEDURE — 99999 PR PBB SHADOW E&M-EST. PATIENT-LVL III: CPT | Mod: PBBFAC,,, | Performed by: INTERNAL MEDICINE

## 2023-02-27 PROCEDURE — 1159F MED LIST DOCD IN RCRD: CPT | Mod: CPTII,S$GLB,, | Performed by: INTERNAL MEDICINE

## 2023-02-27 PROCEDURE — 3078F PR MOST RECENT DIASTOLIC BLOOD PRESSURE < 80 MM HG: ICD-10-PCS | Mod: CPTII,S$GLB,, | Performed by: FAMILY MEDICINE

## 2023-02-27 PROCEDURE — 99214 OFFICE O/P EST MOD 30 MIN: CPT | Mod: S$GLB,,, | Performed by: FAMILY MEDICINE

## 2023-02-27 PROCEDURE — 1160F RVW MEDS BY RX/DR IN RCRD: CPT | Mod: CPTII,S$GLB,, | Performed by: INTERNAL MEDICINE

## 2023-02-27 PROCEDURE — 3075F SYST BP GE 130 - 139MM HG: CPT | Mod: CPTII,S$GLB,, | Performed by: FAMILY MEDICINE

## 2023-02-27 PROCEDURE — 99214 PR OFFICE/OUTPT VISIT, EST, LEVL IV, 30-39 MIN: ICD-10-PCS | Mod: S$GLB,,, | Performed by: FAMILY MEDICINE

## 2023-02-27 PROCEDURE — 3078F DIAST BP <80 MM HG: CPT | Mod: CPTII,S$GLB,, | Performed by: INTERNAL MEDICINE

## 2023-02-27 PROCEDURE — 1126F AMNT PAIN NOTED NONE PRSNT: CPT | Mod: CPTII,S$GLB,, | Performed by: INTERNAL MEDICINE

## 2023-02-27 PROCEDURE — 99999 PR PBB SHADOW E&M-EST. PATIENT-LVL IV: CPT | Mod: PBBFAC,,, | Performed by: FAMILY MEDICINE

## 2023-02-27 PROCEDURE — 99214 PR OFFICE/OUTPT VISIT, EST, LEVL IV, 30-39 MIN: ICD-10-PCS | Mod: S$GLB,,, | Performed by: INTERNAL MEDICINE

## 2023-02-27 PROCEDURE — 3078F PR MOST RECENT DIASTOLIC BLOOD PRESSURE < 80 MM HG: ICD-10-PCS | Mod: CPTII,S$GLB,, | Performed by: INTERNAL MEDICINE

## 2023-02-27 PROCEDURE — 3288F FALL RISK ASSESSMENT DOCD: CPT | Mod: CPTII,S$GLB,, | Performed by: FAMILY MEDICINE

## 2023-02-27 PROCEDURE — 3075F PR MOST RECENT SYSTOLIC BLOOD PRESS GE 130-139MM HG: ICD-10-PCS | Mod: CPTII,S$GLB,, | Performed by: INTERNAL MEDICINE

## 2023-02-27 PROCEDURE — 1125F PR PAIN SEVERITY QUANTIFIED, PAIN PRESENT: ICD-10-PCS | Mod: CPTII,S$GLB,, | Performed by: FAMILY MEDICINE

## 2023-02-27 PROCEDURE — 1159F PR MEDICATION LIST DOCUMENTED IN MEDICAL RECORD: ICD-10-PCS | Mod: CPTII,S$GLB,, | Performed by: INTERNAL MEDICINE

## 2023-02-27 PROCEDURE — 1101F PT FALLS ASSESS-DOCD LE1/YR: CPT | Mod: CPTII,S$GLB,, | Performed by: FAMILY MEDICINE

## 2023-02-27 PROCEDURE — 1126F PR PAIN SEVERITY QUANTIFIED, NO PAIN PRESENT: ICD-10-PCS | Mod: CPTII,S$GLB,, | Performed by: INTERNAL MEDICINE

## 2023-02-27 PROCEDURE — 3078F DIAST BP <80 MM HG: CPT | Mod: CPTII,S$GLB,, | Performed by: FAMILY MEDICINE

## 2023-02-27 RX ORDER — PANTOPRAZOLE SODIUM 40 MG/1
40 TABLET, DELAYED RELEASE ORAL DAILY
Qty: 90 TABLET | Refills: 3 | Status: SHIPPED | OUTPATIENT
Start: 2023-02-27 | End: 2023-09-25 | Stop reason: SDUPTHER

## 2023-02-27 RX ORDER — FUROSEMIDE 20 MG/1
20 TABLET ORAL DAILY PRN
Qty: 90 TABLET | Refills: 1 | Status: SHIPPED | OUTPATIENT
Start: 2023-02-27 | End: 2023-08-29 | Stop reason: SDUPTHER

## 2023-02-27 RX ORDER — METFORMIN HYDROCHLORIDE 500 MG/1
500 TABLET ORAL 2 TIMES DAILY WITH MEALS
Qty: 180 TABLET | Refills: 1 | Status: SHIPPED | OUTPATIENT
Start: 2023-02-27 | End: 2023-08-28
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
## 2023-02-28 ENCOUNTER — TELEPHONE (OUTPATIENT)
Dept: PAIN MEDICINE | Facility: CLINIC | Age: 78
End: 2023-02-28
Payer: MEDICARE

## 2023-02-28 ENCOUNTER — TELEPHONE (OUTPATIENT)
Dept: INTERNAL MEDICINE | Facility: CLINIC | Age: 78
End: 2023-02-28
Payer: MEDICARE

## 2023-02-28 DIAGNOSIS — E11.9 TYPE 2 DIABETES MELLITUS WITHOUT COMPLICATION, WITHOUT LONG-TERM CURRENT USE OF INSULIN: Primary | ICD-10-CM

## 2023-02-28 NOTE — TELEPHONE ENCOUNTER
Pt didn't get labs yesterday after checking in for them so we had to cancel the orders. I will place them back and her chart and Dr Rivas will sign for them

## 2023-02-28 NOTE — TELEPHONE ENCOUNTER
----- Message from Edelmira Arboleda sent at 2/28/2023  8:03 AM CST -----  Contact: Aretha Carias is needing the lab orders replaced as it will not let the current ones be scheduled. Please call her back at [phone].

## 2023-03-05 ENCOUNTER — HOSPITAL ENCOUNTER (OUTPATIENT)
Facility: HOSPITAL | Age: 78
Discharge: HOME | End: 2023-03-06
Attending: EMERGENCY MEDICINE | Admitting: INTERNAL MEDICINE
Payer: MEDICARE

## 2023-03-05 ENCOUNTER — NURSE TRIAGE (OUTPATIENT)
Dept: ADMINISTRATIVE | Facility: CLINIC | Age: 78
End: 2023-03-05
Payer: MEDICARE

## 2023-03-05 DIAGNOSIS — R10.11 RUQ PAIN: ICD-10-CM

## 2023-03-05 DIAGNOSIS — R07.89 INTERMITTENT LEFT-SIDED CHEST PAIN: Chronic | ICD-10-CM

## 2023-03-05 DIAGNOSIS — R73.03 PREDIABETES: ICD-10-CM

## 2023-03-05 DIAGNOSIS — Z12.11 COLON CANCER SCREENING: ICD-10-CM

## 2023-03-05 DIAGNOSIS — R07.9 CHEST PAIN: Primary | ICD-10-CM

## 2023-03-05 DIAGNOSIS — K83.9 BILE DUCT ABNORMALITY: ICD-10-CM

## 2023-03-05 LAB
ALBUMIN SERPL BCP-MCNC: 4.6 G/DL (ref 3.5–5.2)
ALP SERPL-CCNC: 134 U/L (ref 55–135)
ALT SERPL W/O P-5'-P-CCNC: 14 U/L (ref 10–44)
ANION GAP SERPL CALC-SCNC: 14 MMOL/L (ref 8–16)
AST SERPL-CCNC: 21 U/L (ref 10–40)
BASOPHILS # BLD AUTO: 0.03 K/UL (ref 0–0.2)
BASOPHILS NFR BLD: 0.3 % (ref 0–1.9)
BILIRUB SERPL-MCNC: 0.4 MG/DL (ref 0.1–1)
BNP SERPL-MCNC: <10 PG/ML (ref 0–99)
BUN SERPL-MCNC: 16 MG/DL (ref 8–23)
CALCIUM SERPL-MCNC: 10.1 MG/DL (ref 8.7–10.5)
CHLORIDE SERPL-SCNC: 102 MMOL/L (ref 95–110)
CO2 SERPL-SCNC: 24 MMOL/L (ref 23–29)
CREAT SERPL-MCNC: 1.2 MG/DL (ref 0.5–1.4)
D DIMER PPP IA.FEU-MCNC: 0.29 MG/L FEU
DIFFERENTIAL METHOD: ABNORMAL
EOSINOPHIL # BLD AUTO: 0.2 K/UL (ref 0–0.5)
EOSINOPHIL NFR BLD: 1.8 % (ref 0–8)
ERYTHROCYTE [DISTWIDTH] IN BLOOD BY AUTOMATED COUNT: 13.4 % (ref 11.5–14.5)
EST. GFR  (NO RACE VARIABLE): 47 ML/MIN/1.73 M^2
GLUCOSE SERPL-MCNC: 115 MG/DL (ref 70–110)
HCT VFR BLD AUTO: 42.4 % (ref 37–48.5)
HGB BLD-MCNC: 14.1 G/DL (ref 12–16)
IMM GRANULOCYTES # BLD AUTO: 0.03 K/UL (ref 0–0.04)
IMM GRANULOCYTES NFR BLD AUTO: 0.3 % (ref 0–0.5)
LYMPHOCYTES # BLD AUTO: 2.8 K/UL (ref 1–4.8)
LYMPHOCYTES NFR BLD: 30.1 % (ref 18–48)
MCH RBC QN AUTO: 30.5 PG (ref 27–31)
MCHC RBC AUTO-ENTMCNC: 33.3 G/DL (ref 32–36)
MCV RBC AUTO: 92 FL (ref 82–98)
MONOCYTES # BLD AUTO: 0.7 K/UL (ref 0.3–1)
MONOCYTES NFR BLD: 7.7 % (ref 4–15)
NEUTROPHILS # BLD AUTO: 5.6 K/UL (ref 1.8–7.7)
NEUTROPHILS NFR BLD: 59.8 % (ref 38–73)
NRBC BLD-RTO: 0 /100 WBC
PLATELET # BLD AUTO: 294 K/UL (ref 150–450)
PMV BLD AUTO: 8.7 FL (ref 9.2–12.9)
POTASSIUM SERPL-SCNC: 4.4 MMOL/L (ref 3.5–5.1)
PROT SERPL-MCNC: 8.5 G/DL (ref 6–8.4)
RBC # BLD AUTO: 4.62 M/UL (ref 4–5.4)
SARS-COV-2 RDRP RESP QL NAA+PROBE: NEGATIVE
SODIUM SERPL-SCNC: 140 MMOL/L (ref 136–145)
TROPONIN I SERPL DL<=0.01 NG/ML-MCNC: <0.006 NG/ML (ref 0–0.03)
TROPONIN I SERPL DL<=0.01 NG/ML-MCNC: <0.006 NG/ML (ref 0–0.03)
WBC # BLD AUTO: 9.36 K/UL (ref 3.9–12.7)

## 2023-03-05 PROCEDURE — 93010 EKG 12-LEAD: ICD-10-PCS | Mod: ,,, | Performed by: INTERNAL MEDICINE

## 2023-03-05 PROCEDURE — G0378 HOSPITAL OBSERVATION PER HR: HCPCS

## 2023-03-05 PROCEDURE — 80053 COMPREHEN METABOLIC PANEL: CPT | Performed by: PHYSICIAN ASSISTANT

## 2023-03-05 PROCEDURE — U0002 COVID-19 LAB TEST NON-CDC: HCPCS | Performed by: EMERGENCY MEDICINE

## 2023-03-05 PROCEDURE — 85379 FIBRIN DEGRADATION QUANT: CPT | Performed by: INTERNAL MEDICINE

## 2023-03-05 PROCEDURE — 93010 ELECTROCARDIOGRAM REPORT: CPT | Mod: ,,, | Performed by: INTERNAL MEDICINE

## 2023-03-05 PROCEDURE — 25000003 PHARM REV CODE 250: Performed by: PHYSICIAN ASSISTANT

## 2023-03-05 PROCEDURE — 83880 ASSAY OF NATRIURETIC PEPTIDE: CPT | Performed by: PHYSICIAN ASSISTANT

## 2023-03-05 PROCEDURE — 85025 COMPLETE CBC W/AUTO DIFF WBC: CPT | Performed by: PHYSICIAN ASSISTANT

## 2023-03-05 PROCEDURE — 93005 ELECTROCARDIOGRAM TRACING: CPT

## 2023-03-05 PROCEDURE — 99285 EMERGENCY DEPT VISIT HI MDM: CPT | Mod: 25

## 2023-03-05 PROCEDURE — 84484 ASSAY OF TROPONIN QUANT: CPT | Mod: 91 | Performed by: PHYSICIAN ASSISTANT

## 2023-03-05 RX ORDER — NALOXONE HCL 0.4 MG/ML
0.02 VIAL (ML) INJECTION
Status: DISCONTINUED | OUTPATIENT
Start: 2023-03-05 | End: 2023-03-06 | Stop reason: HOSPADM

## 2023-03-05 RX ORDER — ACETAMINOPHEN 500 MG
500 TABLET ORAL EVERY 6 HOURS PRN
COMMUNITY

## 2023-03-05 RX ORDER — DEXTROSE 40 %
15 GEL (GRAM) ORAL
Status: DISCONTINUED | OUTPATIENT
Start: 2023-03-05 | End: 2023-03-06 | Stop reason: HOSPADM

## 2023-03-05 RX ORDER — DEXTROSE 40 %
30 GEL (GRAM) ORAL
Status: DISCONTINUED | OUTPATIENT
Start: 2023-03-05 | End: 2023-03-06 | Stop reason: HOSPADM

## 2023-03-05 RX ORDER — DICLOFENAC SODIUM 10 MG/G
2 GEL TOPICAL 4 TIMES DAILY PRN
Status: DISCONTINUED | OUTPATIENT
Start: 2023-03-05 | End: 2023-03-06 | Stop reason: HOSPADM

## 2023-03-05 RX ORDER — FUROSEMIDE 20 MG/1
20 TABLET ORAL DAILY PRN
Status: DISCONTINUED | OUTPATIENT
Start: 2023-03-05 | End: 2023-03-06 | Stop reason: HOSPADM

## 2023-03-05 RX ORDER — SODIUM CHLORIDE 0.9 % (FLUSH) 0.9 %
10 SYRINGE (ML) INJECTION EVERY 12 HOURS PRN
Status: DISCONTINUED | OUTPATIENT
Start: 2023-03-05 | End: 2023-03-06 | Stop reason: HOSPADM

## 2023-03-05 RX ORDER — ASPIRIN 325 MG
325 TABLET ORAL
Status: COMPLETED | OUTPATIENT
Start: 2023-03-05 | End: 2023-03-05

## 2023-03-05 RX ORDER — GLUCAGON 1 MG
1 KIT INJECTION
Status: DISCONTINUED | OUTPATIENT
Start: 2023-03-05 | End: 2023-03-06 | Stop reason: HOSPADM

## 2023-03-05 RX ADMIN — ASPIRIN 325 MG ORAL TABLET 325 MG: 325 PILL ORAL at 03:03

## 2023-03-05 NOTE — ED PROVIDER NOTES
"Encounter Date: 3/5/2023    SCRIBE #1 NOTE: I, Scottie Sanders, am scribing for, and in the presence of,  Morelia Juarez MD. I have scribed the following portions of the note - Other sections scribed: HPI, ROS.     History     Chief Complaint   Patient presents with    Chest Pain     Left-sided CP with SOB+ at rest and generalized weakness x2 days now. Pt was told by PCP she might have: "A leaky valve." No distress and 5/10 pain.      Patient is a 77 year old female who has a past medical history of arthritis, cystitis, IDDM type II, and gastroesophageal reflux disease presents to the ED due to chest pain. Patient reports constant mid-sternal chest pain with associated shortness of breath, intermittent generalized weakness, and nausea that started Friday, 3/3. Patient describes the chest pain as a fluctuating "burning and sharp" sensation. She also reports having slight pain improvement when she eats. No vomiting and abdominal pain reported. She describes the pain as a 5 out of 10. She states this has never happened before. Patient was told by PCP she might have a "leaky valve" and was told to come to the ED.                      The history is provided by the patient.   Review of patient's allergies indicates:   Allergen Reactions    Decadron [dexamethasone] Other (See Comments)     Syncope (causes spike in blood glucose with previous bout of syncope)    Celestone [betamethasone sodium phosphate]     Celestone [betamethasone] Other (See Comments)     Syncope (causes spike in blood glucose with previous bout of syncope)    Decadron-la      Past Medical History:   Diagnosis Date    Arthritis of both knees 2023    Cystitis 2021    Diabetes mellitus     Gastroesophageal reflux disease 2023    Type 2 diabetes mellitus without complication 2021     Past Surgical History:   Procedure Laterality Date     SECTION      CHOLECYSTECTOMY      CHOLECYSTECTOMY       Family History   Problem " Relation Age of Onset    Diabetes Mother     No Known Problems Father      Social History     Tobacco Use    Smoking status: Former     Types: Cigarettes     Quit date: 6/10/2014     Years since quittin.7    Smokeless tobacco: Never   Substance Use Topics    Alcohol use: No    Drug use: No     Review of Systems   Constitutional:  Positive for appetite change. Negative for fever.   HENT:  Negative for sore throat.    Respiratory:  Negative for shortness of breath.    Cardiovascular:  Positive for chest pain.   Gastrointestinal:  Negative for abdominal pain and nausea.   Genitourinary:  Negative for dysuria.   Musculoskeletal:  Negative for back pain.   Skin:  Negative for rash.   Neurological:  Negative for weakness.   Hematological:  Does not bruise/bleed easily.     Physical Exam     Initial Vitals [23 1422]   BP Pulse Resp Temp SpO2   133/73 90 20 98.5 °F (36.9 °C) 96 %      MAP       --         Physical Exam    Nursing note and vitals reviewed.  Constitutional: She appears well-developed and well-nourished.   HENT:   Head: Normocephalic and atraumatic.   Neck: Neck supple.   Normal range of motion.  Cardiovascular:  Normal rate, regular rhythm, normal heart sounds and intact distal pulses.           Pulmonary/Chest: Breath sounds normal.   Abdominal: Abdomen is soft.   Musculoskeletal:      Cervical back: Normal range of motion and neck supple.     Neurological: She is alert and oriented to person, place, and time.   Skin: Skin is warm and dry.   Psychiatric: She has a normal mood and affect. Her behavior is normal. Judgment and thought content normal.       ED Course   Procedures  Labs Reviewed   CBC W/ AUTO DIFFERENTIAL - Abnormal; Notable for the following components:       Result Value    MPV 8.7 (*)     All other components within normal limits   COMPREHENSIVE METABOLIC PANEL - Abnormal; Notable for the following components:    Glucose 115 (*)     Total Protein 8.5 (*)     eGFR 47 (*)     All other  components within normal limits   TROPONIN I   B-TYPE NATRIURETIC PEPTIDE   TROPONIN I   D DIMER, QUANTITATIVE   SARS-COV-2 RDRP GENE     EKG Readings: (Independently Interpreted)   Initial: 1421. Rhythm: Normal Sinus Rhythm. Heart Rate: 92. Ectopy: No Ectopy. Conduction: Normal. ST Segments: Normal ST Segments. T Waves: Normal. Clinical Impression: Normal Sinus Rhythm     Imaging Results              X-Ray Chest AP Portable (Final result)  Result time 03/05/23 16:56:01      Final result by Jhonny Thomas MD (03/05/23 16:56:01)                   Impression:      1. No acute cardiopulmonary process.      Electronically signed by: Jhonny Thomas MD  Date:    03/05/2023  Time:    16:56               Narrative:    EXAMINATION:  XR CHEST AP PORTABLE    CLINICAL HISTORY:  Chest Pain;    TECHNIQUE:  Single frontal view of the chest was performed.    COMPARISON:  01/19/2023    FINDINGS:  The cardiomediastinal silhouette is not enlarged noting calcification of the aorta..  There is no pleural effusion.  The trachea is midline.  The lungs are symmetrically expanded bilaterally without evidence of acute parenchymal process. No large focal consolidation seen.  There is no pneumothorax.  The osseous structures are remarkable for degenerative changes of the spine.  There is surgical change projected over the right upper quadrant..                                    X-Rays:   Independently Interpreted Readings:   Other Readings:  Chest x-ray shows no infiltrates or effusions, no pneumothorax, no cardiomegaly.  Medications   aspirin tablet 325 mg (325 mg Oral Given 3/5/23 1524)     Medical Decision Making:   Differential Diagnosis:   Differential diagnosis includes coronary artery disease, MI, angina, PE, pneumonia  ED Management:  Medical decision-making:   Patient is a 77-year-old female with chest pain for the past 2 days.  She states that on Friday, 2 days ago she had weakness and stayed in bed all day.  She started having  chest pain yesterday and today.  She has some subjective shortness of breath along with chest pain and some nausea.  Her initial troponin is normal, her chest x-ray is clear, her EKG shows no ischemic changes.  Normal Medicine consulted for evaluation of this patient's chest pain.        Scribe Attestation:   Scribe #1: I performed the above scribed service and the documentation accurately describes the services I performed. I attest to the accuracy of the note.      ED Course as of 03/05/23 1850   Sun Mar 05, 2023   1845 [facility] consulted for admission [ST]      ED Course User Index  [ST] Morelia Juarez MD                 Clinical Impression:   Final diagnoses:  [R07.9] Chest pain (Primary)  [R10.11] RUQ pain         I, Morelia Juarez, personally performed the services described in this documentation. All medical record entries made by the scribe were at my direction and in my presence.  I have reviewed the chart and agree that the record reflects my personal performance and is accurate and complete. Morelia Juarez M.D. 6:51 PM03/05/2023    ED Disposition Condition    Observation Stable                Morelia Juarez MD  03/05/23 1851

## 2023-03-05 NOTE — ED TRIAGE NOTES
"Aretha Holt, an 77 y.o. female presents to the ED with worsened pain and burning in her chest for a couple days. Pt also reports swelling in her legs sometimes and weakness.       Review of patient's allergies indicates:   Allergen Reactions    Decadron [dexamethasone] Other (See Comments)     Syncope (causes spike in blood glucose with previous bout of syncope)    Celestone [betamethasone sodium phosphate]     Celestone [betamethasone] Other (See Comments)     Syncope (causes spike in blood glucose with previous bout of syncope)    Decadron-la      Chief Complaint   Patient presents with    Chest Pain     Left-sided CP with SOB+ at rest and generalized weakness x2 days now. Pt was told by PCP she might have: "A leaky valve." No distress and 5/10 pain.      Past Medical History:   Diagnosis Date    Arthritis of both knees 2/27/2023    Cystitis 6/13/2021    Diabetes mellitus     Gastroesophageal reflux disease 2/27/2023    Type 2 diabetes mellitus without complication 6/13/2021       Patient identifiers verified and correct.     LOC: The patient is awake, alert and aware of environment with an appropriate affect, the patient is oriented x 3 and speaking appropriately.     APPEARANCE: Patient appears comfortable and in no acute distress, patient is clean and well groomed.    HEENT: Head symmetrical. Eyes bilateral.  Bilateral ears without drainage. Bilateral nares patent, throat clear.    SKIN: The skin is warm and dry, color consistent with ethnicity, patient has normal skin turgor and moist mucus membranes,    MUSCULOSKELETAL: Patient moving all extremities spontaneously, no swelling noted.    RESPIRATORY: Airway is open and patent, respirations are spontaneous, patient has a normal effort and rate, no accessory muscle use noted.     CARDIAC: Patient has a normal rate and regular rhythm, no edema noted, capillary refill < 3 seconds.     GASTRO: Abdomen soft and non-distended.  NEURO: Pt opens eyes " spontaneously pupils equal, round, and reactive. behavior appropriate to situation, follows commands, facial expression symmetrical,      NEUROVASCULAR: All extremities are warm and pink.       Will continue to monitor.

## 2023-03-05 NOTE — FIRST PROVIDER EVALUATION
" Emergency Department TeleTriage Encounter Note      CHIEF COMPLAINT    Chief Complaint   Patient presents with    Chest Pain     Left-sided CP with SOB+ at rest and generalized weakness x2 days now. Pt was told by PCP she might have: "A leaky valve." No distress and 5/10 pain.        VITAL SIGNS   Initial Vitals [03/05/23 1422]   BP Pulse Resp Temp SpO2   133/73 90 20 98.5 °F (36.9 °C) 96 %      MAP       --            ALLERGIES    Review of patient's allergies indicates:   Allergen Reactions    Decadron [dexamethasone] Other (See Comments)     Syncope (causes spike in blood glucose with previous bout of syncope)    Celestone [betamethasone sodium phosphate]     Celestone [betamethasone] Other (See Comments)     Syncope (causes spike in blood glucose with previous bout of syncope)    Decadron-la        PROVIDER TRIAGE NOTE  Patient presents with intermittent chest pain and sob worse with exertion since yesterday.       ORDERS  Labs Reviewed - No data to display    ED Orders (720h ago, onward)      Start Ordered     Status Ordering Provider    03/05/23 1422 03/05/23 1421  EKG 12-lead  Once         Completed by MALACHI THOMAS on 3/5/2023 at  2:21 PM DEEPIKA CRAIG              Virtual Visit Note: The provider triage portion of this emergency department evaluation and documentation was performed via [employer], a HIPAA-compliant telemedicine application, in concert with a tele-presenter in the room. A face to face patient evaluation with one of my colleagues will occur once the patient is placed in an emergency department room.      DISCLAIMER: This note was prepared with [employer] voice recognition transcription software. Garbled syntax, mangled pronouns, and other bizarre constructions may be attributed to that software system.    "

## 2023-03-05 NOTE — TELEPHONE ENCOUNTER
"Patient states she has been feeling weak for two days now. She has also been experiecing SOB and chest pain; symptoms get worse on exertion. Chest pain comes and goes but is getting worse. Pt sounds weak and tired. Care advice is to go to ED now; she states she will have someone come get her and go in.     Reason for Disposition   [1] Chest pain (or "angina") comes and goes AND [2] is happening more often (increasing in frequency) or getting worse (increasing in severity) (Exception: chest pains that last only a few seconds)    Additional Information   Negative: SEVERE difficulty breathing (e.g., struggling for each breath, speaks in single words)   Negative: Difficult to awaken or acting confused (e.g., disoriented, slurred speech)   Negative: Shock suspected (e.g., cold/pale/clammy skin, too weak to stand, low BP, rapid pulse)   Negative: Passed out (i.e., lost consciousness, collapsed and was not responding)   Negative: [1] Chest pain lasts > 5 minutes AND [2] age > 44   Negative: [1] Chest pain lasts > 5 minutes AND [2] age > 30 AND [3] one or more cardiac risk factors (e.g., diabetes, high blood pressure, high cholesterol, smoker, or strong family history of heart disease)   Negative: [1] Chest pain lasts > 5 minutes AND [2] history of heart disease (i.e., angina, heart attack, heart failure, bypass surgery, takes nitroglycerin)   Negative: [1] Chest pain lasts > 5 minutes AND [2] described as crushing, pressure-like, or heavy   Negative: Heart beating < 50 beats per minute OR > 140 beats per minute   Negative: Visible sweat on face or sweat dripping down face   Negative: Sounds like a life-threatening emergency to the triager   Negative: SEVERE chest pain    Protocols used: Chest Pain-A-AH    "

## 2023-03-06 VITALS
TEMPERATURE: 96 F | DIASTOLIC BLOOD PRESSURE: 65 MMHG | SYSTOLIC BLOOD PRESSURE: 139 MMHG | RESPIRATION RATE: 18 BRPM | WEIGHT: 181 LBS | HEART RATE: 85 BPM | HEIGHT: 64 IN | OXYGEN SATURATION: 100 % | BODY MASS INDEX: 30.9 KG/M2

## 2023-03-06 PROBLEM — Z12.11 COLON CANCER SCREENING: Status: ACTIVE | Noted: 2023-03-06

## 2023-03-06 PROBLEM — K83.9 BILE DUCT ABNORMALITY: Status: ACTIVE | Noted: 2023-03-06

## 2023-03-06 LAB
ALBUMIN SERPL BCP-MCNC: 4 G/DL (ref 3.5–5.2)
ALP SERPL-CCNC: 123 U/L (ref 55–135)
ALT SERPL W/O P-5'-P-CCNC: 13 U/L (ref 10–44)
ANION GAP SERPL CALC-SCNC: 12 MMOL/L (ref 8–16)
ASCENDING AORTA: 2.7 CM
AST SERPL-CCNC: 14 U/L (ref 10–40)
AV INDEX (PROSTH): 0.81
AV MEAN GRADIENT: 2 MMHG
AV PEAK GRADIENT: 3 MMHG
AV VALVE AREA: 2.79 CM2
AV VELOCITY RATIO: 0.82
BASOPHILS # BLD AUTO: 0.04 K/UL (ref 0–0.2)
BASOPHILS NFR BLD: 0.4 % (ref 0–1.9)
BILIRUB SERPL-MCNC: 0.4 MG/DL (ref 0.1–1)
BSA FOR ECHO PROCEDURE: 1.93 M2
BUN SERPL-MCNC: 22 MG/DL (ref 8–23)
CALCIUM SERPL-MCNC: 9.5 MG/DL (ref 8.7–10.5)
CHLORIDE SERPL-SCNC: 105 MMOL/L (ref 95–110)
CO2 SERPL-SCNC: 24 MMOL/L (ref 23–29)
CREAT SERPL-MCNC: 1.1 MG/DL (ref 0.5–1.4)
CV ECHO LV RWT: 0.43 CM
DIFFERENTIAL METHOD: ABNORMAL
DOP CALC AO PEAK VEL: 0.82 M/S
DOP CALC AO VTI: 14.9 CM
DOP CALC LVOT AREA: 3.5 CM2
DOP CALC LVOT DIAMETER: 2.1 CM
DOP CALC LVOT PEAK VEL: 0.67 M/S
DOP CALC LVOT STROKE VOLUME: 41.54 CM3
DOP CALC MV VTI: 16.3 CM
DOP CALCLVOT PEAK VEL VTI: 12 CM
E WAVE DECELERATION TIME: 303.12 MSEC
E/A RATIO: 0.61
E/E' RATIO: 9.67 M/S
ECHO LV POSTERIOR WALL: 0.83 CM (ref 0.6–1.1)
EJECTION FRACTION: 65 %
EOSINOPHIL # BLD AUTO: 0.2 K/UL (ref 0–0.5)
EOSINOPHIL NFR BLD: 2.4 % (ref 0–8)
ERYTHROCYTE [DISTWIDTH] IN BLOOD BY AUTOMATED COUNT: 13.3 % (ref 11.5–14.5)
EST. GFR  (NO RACE VARIABLE): 52 ML/MIN/1.73 M^2
FRACTIONAL SHORTENING: 34 % (ref 28–44)
GLUCOSE SERPL-MCNC: 103 MG/DL (ref 70–110)
HCT VFR BLD AUTO: 38 % (ref 37–48.5)
HGB BLD-MCNC: 12.4 G/DL (ref 12–16)
IMM GRANULOCYTES # BLD AUTO: 0.02 K/UL (ref 0–0.04)
IMM GRANULOCYTES NFR BLD AUTO: 0.2 % (ref 0–0.5)
INTERVENTRICULAR SEPTUM: 0.8 CM (ref 0.6–1.1)
IVRT: 145.58 MSEC
LA MAJOR: 4.29 CM
LA MINOR: 4.43 CM
LA WIDTH: 3.3 CM
LEFT ATRIUM SIZE: 3.77 CM
LEFT ATRIUM VOLUME INDEX MOD: 18.9 ML/M2
LEFT ATRIUM VOLUME INDEX: 24.6 ML/M2
LEFT ATRIUM VOLUME MOD: 35.28 CM3
LEFT ATRIUM VOLUME: 46.09 CM3
LEFT INTERNAL DIMENSION IN SYSTOLE: 2.52 CM (ref 2.1–4)
LEFT VENTRICLE DIASTOLIC VOLUME INDEX: 18.37 ML/M2
LEFT VENTRICLE DIASTOLIC VOLUME: 34.36 ML
LEFT VENTRICLE MASS INDEX: 48 G/M2
LEFT VENTRICLE SYSTOLIC VOLUME INDEX: 7.2 ML/M2
LEFT VENTRICLE SYSTOLIC VOLUME: 13.42 ML
LEFT VENTRICULAR INTERNAL DIMENSION IN DIASTOLE: 3.82 CM (ref 3.5–6)
LEFT VENTRICULAR MASS: 88.9 G
LV LATERAL E/E' RATIO: 9.67 M/S
LV SEPTAL E/E' RATIO: 9.67 M/S
LVOT MG: 1.05 MMHG
LVOT MV: 0.49 CM/S
LYMPHOCYTES # BLD AUTO: 3.5 K/UL (ref 1–4.8)
LYMPHOCYTES NFR BLD: 37.6 % (ref 18–48)
MCH RBC QN AUTO: 29.7 PG (ref 27–31)
MCHC RBC AUTO-ENTMCNC: 32.6 G/DL (ref 32–36)
MCV RBC AUTO: 91 FL (ref 82–98)
MONOCYTES # BLD AUTO: 0.8 K/UL (ref 0.3–1)
MONOCYTES NFR BLD: 9 % (ref 4–15)
MV MEAN GRADIENT: 1 MMHG
MV PEAK A VEL: 0.95 M/S
MV PEAK E VEL: 0.58 M/S
MV PEAK GRADIENT: 3 MMHG
MV VALVE AREA BY CONTINUITY EQUATION: 2.55 CM2
NEUTROPHILS # BLD AUTO: 4.7 K/UL (ref 1.8–7.7)
NEUTROPHILS NFR BLD: 50.4 % (ref 38–73)
NRBC BLD-RTO: 0 /100 WBC
PISA TR MAX VEL: 1.57 M/S
PLATELET # BLD AUTO: 265 K/UL (ref 150–450)
PMV BLD AUTO: 8.5 FL (ref 9.2–12.9)
POCT GLUCOSE: 111 MG/DL (ref 70–110)
POCT GLUCOSE: 146 MG/DL (ref 70–110)
POTASSIUM SERPL-SCNC: 3.9 MMOL/L (ref 3.5–5.1)
PROT SERPL-MCNC: 7.3 G/DL (ref 6–8.4)
PULM VEIN S/D RATIO: 1.18
PV PEAK D VEL: 0.22 M/S
PV PEAK S VEL: 0.26 M/S
RA MAJOR: 5.01 CM
RA PRESSURE: 3 MMHG
RA WIDTH: 3.7 CM
RBC # BLD AUTO: 4.17 M/UL (ref 4–5.4)
RIGHT VENTRICULAR END-DIASTOLIC DIMENSION: 3.03 CM
RV TISSUE DOPPLER FREE WALL SYSTOLIC VELOCITY 1 (APICAL 4 CHAMBER VIEW): 0.01 CM/S
SODIUM SERPL-SCNC: 141 MMOL/L (ref 136–145)
STJ: 2.6 CM
T4 FREE SERPL-MCNC: 1.05 NG/DL (ref 0.71–1.51)
TDI LATERAL: 0.06 M/S
TDI SEPTAL: 0.06 M/S
TDI: 0.06 M/S
TR MAX PG: 10 MMHG
TROPONIN I SERPL DL<=0.01 NG/ML-MCNC: <0.006 NG/ML (ref 0–0.03)
TSH SERPL DL<=0.005 MIU/L-ACNC: 1.38 UIU/ML (ref 0.4–4)
TV REST PULMONARY ARTERY PRESSURE: 13 MMHG
WBC # BLD AUTO: 9.36 K/UL (ref 3.9–12.7)

## 2023-03-06 PROCEDURE — 85025 COMPLETE CBC W/AUTO DIFF WBC: CPT

## 2023-03-06 PROCEDURE — 93005 ELECTROCARDIOGRAM TRACING: CPT

## 2023-03-06 PROCEDURE — 99223 PR INITIAL HOSPITAL CARE,LEVL III: ICD-10-PCS | Mod: 25,,, | Performed by: NURSE PRACTITIONER

## 2023-03-06 PROCEDURE — 25000003 PHARM REV CODE 250: Performed by: STUDENT IN AN ORGANIZED HEALTH CARE EDUCATION/TRAINING PROGRAM

## 2023-03-06 PROCEDURE — 25000003 PHARM REV CODE 250

## 2023-03-06 PROCEDURE — G0378 HOSPITAL OBSERVATION PER HR: HCPCS

## 2023-03-06 PROCEDURE — 93010 ELECTROCARDIOGRAM REPORT: CPT | Mod: ,,, | Performed by: INTERNAL MEDICINE

## 2023-03-06 PROCEDURE — 99223 1ST HOSP IP/OBS HIGH 75: CPT | Mod: 25,,, | Performed by: NURSE PRACTITIONER

## 2023-03-06 PROCEDURE — 84443 ASSAY THYROID STIM HORMONE: CPT

## 2023-03-06 PROCEDURE — 80053 COMPREHEN METABOLIC PANEL: CPT

## 2023-03-06 PROCEDURE — 84484 ASSAY OF TROPONIN QUANT: CPT

## 2023-03-06 PROCEDURE — 93010 EKG 12-LEAD: ICD-10-PCS | Mod: ,,, | Performed by: INTERNAL MEDICINE

## 2023-03-06 PROCEDURE — 84439 ASSAY OF FREE THYROXINE: CPT

## 2023-03-06 RX ORDER — IBUPROFEN 400 MG/1
400 TABLET ORAL ONCE
Status: COMPLETED | OUTPATIENT
Start: 2023-03-06 | End: 2023-03-06

## 2023-03-06 RX ORDER — LIDOCAINE 50 MG/G
1 PATCH TOPICAL DAILY
Qty: 5 PATCH | Refills: 0 | Status: SHIPPED | OUTPATIENT
Start: 2023-03-06 | End: 2023-03-11

## 2023-03-06 RX ORDER — LIDOCAINE 50 MG/G
1 PATCH TOPICAL
Status: DISCONTINUED | OUTPATIENT
Start: 2023-03-06 | End: 2023-03-06 | Stop reason: HOSPADM

## 2023-03-06 RX ADMIN — FUROSEMIDE 20 MG: 20 TABLET ORAL at 03:03

## 2023-03-06 RX ADMIN — LIDOCAINE 1 PATCH: 50 PATCH CUTANEOUS at 01:03

## 2023-03-06 RX ADMIN — IBUPROFEN 400 MG: 400 TABLET, FILM COATED ORAL at 01:03

## 2023-03-06 NOTE — H&P
"[facility] Medicine H&P Note     Admitting Team: [facility] Hospitalist Team B  Attending Physician: Morelia Juarez MD  Resident: Anh Wilson MD  Intern: Frandy Page MD (Nak)    Date of Admit: 3/5/2023    Chief Complaint     Chest Pain (Left-sided CP with SOB+ at rest and generalized weakness x2 days now. Pt was told by PCP she might have: "A leaky valve." No distress and 5/10 pain. )   for ~1wk.    Subjective:      History of Present Illness:  Aretha Holt is a 77 y.o. female with a PMHx of GERD,T2DM, Arthritis in both knees, no known cardiac hx. The patient presented on 3/5/2023 for evaluation of chest pain for ~1wk.    In the ED, cardiac work-up in ED unremarkable w/ BNP, Trops, and EKG. RUQ U/S showed CBD dilation (cholecystectomy v obstruction). The patient received ASA w/o any other interventions. CMP and CBC relatively unremarkable w/ stable H&H and stable ALP/AST/ALT.     Pt stated having on and off chest pains for a while but got worse over the past wk. Pain is described as "sharp" but denied dullness, pressure, or palpitation that radiates across her chest. Associated symptoms include nausea w/o vomiting and generalized weakness but denied falling or LOC. Worsened w/ stress and exertion; relieved w/ rest. Pt was seen by cardiologist Dr Pierre last month (unremarkable stress echo). Pt was anxious/concerned throughout interview about etiology of chief complaint. Pt stated having a hx of DM, smoking, arthritis, GERD, and anxiousness w/ a cardiac hx in mother (passed after CVA in her 50s) and takes medications fo DM, GERD, and leg swelling but denied hx of HTN.    Past Medical History:  Past Medical History:   Diagnosis Date    Arthritis of both knees 2023    Cystitis 2021    Diabetes mellitus     Gastroesophageal reflux disease 2023    Type 2 diabetes mellitus without complication 2021     Past Surgical History:  Past Surgical History:   Procedure Laterality Date     SECTION "      CHOLECYSTECTOMY      CHOLECYSTECTOMY       Allergies:  Review of patient's allergies indicates:   Allergen Reactions    Decadron [dexamethasone] Other (See Comments)     Syncope (causes spike in blood glucose with previous bout of syncope)    Celestone [betamethasone sodium phosphate]     Celestone [betamethasone] Other (See Comments)     Syncope (causes spike in blood glucose with previous bout of syncope)    Decadron-la      Home Medications:  Prior to Admission medications    Medication Sig Start Date End Date Taking? Authorizing Provider   ascorbic acid, vitamin C, (VITAMIN C) 500 MG tablet Take 500 mg by mouth once daily.    Historical Provider   clobetasol 0.05% (TEMOVATE) 0.05 % Oint Apply topically daily as needed. 22   Historical Provider   diclofenac sodium (VOLTAREN) 1 % Gel Apply 2 g topically 4 (four) times daily as needed (pain). For muscular pain 22  Oliva Stanton NP   furosemide (LASIX) 20 MG tablet Take 1 tablet (20 mg total) by mouth daily as needed (edema, swelling, fluid). 23   Joe Pierre MD   metFORMIN (GLUCOPHAGE) 500 MG tablet Take 1 tablet (500 mg total) by mouth 2 (two) times daily with meals. 23  Jhonny Rivas MD   multivitamin capsule Take 1 capsule by mouth once daily.    Historical Provider   pantoprazole (PROTONIX) 40 MG tablet Take 1 tablet (40 mg total) by mouth once daily. 23  Jhonny Rivas MD   vitamin D (VITAMIN D3) 1000 units Tab Take 1,000 Units by mouth once daily.    Historical Provider   zinc gluconate 50 mg tablet Take 50 mg by mouth once daily.    Historical Provider       Family History:  Family History   Problem Relation Age of Onset    Diabetes Mother     No Known Problems Father        Social History:  Social History     Tobacco Use    Smoking status: Former     Types: Cigarettes     Quit date: 6/10/2014     Years since quittin.7    Smokeless tobacco: Never   Substance Use Topics    Alcohol use: No     Drug use: No       Review of Systems:  Review of Systems   Constitutional:  Negative for chills and fever.   HENT:  Negative for sore throat.    Eyes:  Negative for blurred vision and photophobia.   Respiratory:  Negative for cough and hemoptysis.    Cardiovascular:  Positive for chest pain and leg swelling. Negative for palpitations and orthopnea.        Described as sharp   Gastrointestinal:  Positive for heartburn and nausea. Negative for abdominal pain, diarrhea and vomiting.   Musculoskeletal:  Negative for falls.   Neurological:  Negative for dizziness and headaches.   Psychiatric/Behavioral:  The patient is nervous/anxious.      Health Care Maintenance :   Primary Care Physician: Jhonny Rivas MD   Immunizations:   Immunization History   Administered Date(s) Administered    COVID-19, MRNA, LN-S, PF (MODERNA FULL 0.5 ML DOSE) 2021, 2021, 2021    COVID-19, mRNA, LNP-S, bivalent booster, PF (PFIZER OMICRON) 10/28/2022    Influenza (FLUAD) - Quadrivalent - Adjuvanted - PF *Preferred* (65+) 2022    OPV 1963    Pneumococcal Polysaccharide - 23 Valent 2022    Td (Adult), Unspecified Formulation 2007    Tdap 09/15/2021    Zoster Recombinant 2022      Cancer Screening:  PAP: current  MMG: current  Colonoscopy: current    Objective:   Last 24 Hour Vital Signs:  BP  Min: 133/73  Max: 145/78  Temp  Av.5 °F (36.9 °C)  Min: 98.5 °F (36.9 °C)  Max: 98.5 °F (36.9 °C)  Pulse  Av.7  Min: 77  Max: 90  Resp  Av.7  Min: 16  Max: 20  SpO2  Av.7 %  Min: 95 %  Max: 96 %  There is no height or weight on file to calculate BMI.  No intake/output data recorded.    Physical Examination:  Physical Exam   Constitutional: She is oriented to person, place, and time. No distress.   HENT:   Head: Normocephalic and atraumatic.   Mouth/Throat: Mucous membranes are moist. Oropharynx is clear.   Eyes: Pupils are equal, round, and reactive to light. Conjunctivae are normal.    Cardiovascular: Normal rate, regular rhythm, normal heart sounds and normal pulses. Pulmonary:      Effort: Pulmonary effort is normal.      Breath sounds: Normal breath sounds.     Abdominal: Soft.   Tenderness to palpation in RUQ w/ + Casillas sign     Musculoskeletal:      Comments: Trace edema bilaterally. Tenderness to palpation on BLEs   Neurological: She is alert and oriented to person, place, and time.   Skin: Skin is warm and dry. Capillary refill takes less than 2 seconds.   Psychiatric:   anxious      Laboratory:  Most Recent Data:  CBC:   Lab Results   Component Value Date    WBC 9.36 03/05/2023    HGB 14.1 03/05/2023    HCT 42.4 03/05/2023     03/05/2023    MCV 92 03/05/2023    RDW 13.4 03/05/2023     WBC Differential: wnl    BMP:   Lab Results   Component Value Date     03/05/2023    K 4.4 03/05/2023     03/05/2023    CO2 24 03/05/2023    BUN 16 03/05/2023    CREATININE 1.2 03/05/2023     (H) 03/05/2023    CALCIUM 10.1 03/05/2023    MG 2.1 06/13/2021     LFTs:   Lab Results   Component Value Date    PROT 8.5 (H) 03/05/2023    ALBUMIN 4.6 03/05/2023    BILITOT 0.4 03/05/2023    AST 21 03/05/2023    ALKPHOS 134 03/05/2023    ALT 14 03/05/2023     Coags:   Lab Results   Component Value Date    INR 1.2 12/13/2018     FLP:   Lab Results   Component Value Date    CHOL 165 02/04/2022    HDL 50 02/04/2022    LDLCALC 94.0 02/04/2022    TRIG 105 02/04/2022    CHOLHDL 30.3 02/04/2022     DM:   Lab Results   Component Value Date    HGBA1C 5.9 (H) 08/05/2022    HGBA1C 6.3 (H) 02/04/2022    HGBA1C 9.0 (H) 05/21/2017    LDLCALC 94.0 02/04/2022    CREATININE 1.2 03/05/2023     Thyroid:   Lab Results   Component Value Date    TSH 1.003 08/05/2022     Anemia:   Lab Results   Component Value Date    IRON 79 08/05/2022    TIBC 269 08/05/2022    FERRITIN 482 (H) 08/05/2022     Cardiac:   Lab Results   Component Value Date    TROPONINI <0.006 03/05/2023    BNP <10 03/05/2023     Urinalysis:   Lab  Results   Component Value Date    LABURIN  06/12/2021     Multiple organisms isolated. None in predominance.  Repeat if    LABURIN clinically necessary. 06/12/2021    COLORU Yellow 12/06/2022    CLARITYU Clear 12/06/2022    SPECGRAV 1.020 12/06/2022    NITRITE neg 12/06/2022    KETONESU neg 12/06/2022    UROBILINOGEN normal 12/06/2022    WBCUA 9 (H) 06/12/2021     Trended Lab Data:  Recent Labs   Lab 03/05/23  1521   WBC 9.36   HGB 14.1   HCT 42.4      MCV 92   RDW 13.4      K 4.4      CO2 24   BUN 16   CREATININE 1.2   *   PROT 8.5*   ALBUMIN 4.6   BILITOT 0.4   AST 21   ALKPHOS 134   ALT 14     Trended Cardiac Data:  Recent Labs   Lab 03/05/23  1521 03/05/23  1847   TROPONINI <0.006 <0.006   BNP <10  --      Microbiology Data:  Microbiology Results (last 7 days)       ** No results found for the last 168 hours. **          Other Results:  EKG (my interpretation): pending read    Radiology:  X-Ray Chest AP Portable    Result Date: 3/5/2023  EXAMINATION: XR CHEST AP PORTABLE CLINICAL HISTORY: Chest Pain; TECHNIQUE: Single frontal view of the chest was performed. COMPARISON: 01/19/2023 FINDINGS: The cardiomediastinal silhouette is not enlarged noting calcification of the aorta..  There is no pleural effusion.  The trachea is midline.  The lungs are symmetrically expanded bilaterally without evidence of acute parenchymal process. No large focal consolidation seen.  There is no pneumothorax.  The osseous structures are remarkable for degenerative changes of the spine.  There is surgical change projected over the right upper quadrant..     1. No acute cardiopulmonary process. Electronically signed by: Jhonny Thomas MD Date:    03/05/2023 Time:    16:56    X-ray Knee Ortho Bilateral    Result Date: 2/27/2023  EXAMINATION: XR KNEE ORTHO BILAT CLINICAL HISTORY: Bilateral primary osteoarthritis of knee TECHNIQUE: AP standing, lateral and Merchant views of both knees were performed. COMPARISON:  "None FINDINGS: The medial and lateral compartment joint spaces appear to be relatively well maintained.  Minimal degenerative change seen at the right patellofemoral compartment with more moderate degenerative change seen at the left patellofemoral compartment.  No acute fracture or dislocation.  No joint effusions.     As above Electronically signed by: Venkatesh Parada DO Date:    02/27/2023 Time:    16:23      Assessment:     Aretha Holt is a 77 y.o. female with a PMHx of GERD,T2DM, Arthritis in both knees, no known cardiac hx. The patient presented on 3/5/2023 for evaluation of chest pain for ~1wk.  In the ED, cardiac work-up in ED unremarkable w/ BNP, Trops, and EKG. Cardiac work-up last month by Dr Peirre showed LVEF ~60%-65% w/ mild mitral regurg w/ neg stress test for ischemia. RUQ U/S showed CBD dilation (cholecystectomy v obstruction). The patient received ASA w/o any other interventions. CMP and CBC relatively unremarkable w/ stable H&H and stable ALP/AST/ALT.     Plan:   Chest Pain  Trop, BNP, and EKG unremarkable on initial w/u in ED  Cardiac work-up last month by Dr Pierre showed LVEF ~60%-65% w/ mild mitral regurg w/ neg stress test for ischemia.   Pending follow-up Trop and Echo  Cardiology consulted; appreciate recs    Leg Swelling  Hx of bilateral OA in knees w/ trace pedal edema  Continue home Lasix    GERD  RUQ U/S showed CBD dilation (cholecystectomy v obstruction)  Continue home PPI  GI consulted; appreciate recs    T2DM  Pending HbA1c  SSI    Thryoid work-up  Follow-up TSH, free T4    Ppx: Lov  Diet: Diabetic  Code: Full    Disposition: pending resolution/improvement of chest pain work-up    Frandy Page MD (Nak)  [facility] Internal Medicine, PGY-1    [facility] Medicine Hospitalist Pager numbers:   [facility] Hospitalist Medicine Team A (Corin/Ghanshyam): [phone]  [facility] Hospitalist Medicine Team B (Ted/Charles):  [phone]    "

## 2023-03-06 NOTE — SUBJECTIVE & OBJECTIVE
Past Medical History:   Diagnosis Date    Arthritis of both knees 2023    Cystitis 2021    Diabetes mellitus     Gastroesophageal reflux disease 2023    Type 2 diabetes mellitus without complication 2021       Past Surgical History:   Procedure Laterality Date     SECTION      CHOLECYSTECTOMY      CHOLECYSTECTOMY         Review of patient's allergies indicates:   Allergen Reactions    Decadron [dexamethasone] Other (See Comments)     Syncope (causes spike in blood glucose with previous bout of syncope)    Celestone [betamethasone sodium phosphate]     Celestone [betamethasone] Other (See Comments)     Syncope (causes spike in blood glucose with previous bout of syncope)    Decadron-la        No current facility-administered medications on file prior to encounter.     Current Outpatient Medications on File Prior to Encounter   Medication Sig    acetaminophen (TYLENOL) 500 MG tablet Take 500 mg by mouth every 6 (six) hours as needed for Pain.    ascorbic acid, vitamin C, (VITAMIN C) 500 MG tablet Take 500 mg by mouth once daily.    clobetasol 0.05% (TEMOVATE) 0.05 % Oint Apply topically daily as needed.    diclofenac sodium (VOLTAREN) 1 % Gel Apply 2 g topically 4 (four) times daily as needed (pain). For muscular pain    furosemide (LASIX) 20 MG tablet Take 1 tablet (20 mg total) by mouth daily as needed (edema, swelling, fluid).    metFORMIN (GLUCOPHAGE) 500 MG tablet Take 1 tablet (500 mg total) by mouth 2 (two) times daily with meals. (Patient taking differently: Take 1,000 mg by mouth 2 (two) times daily with meals.)    pantoprazole (PROTONIX) 40 MG tablet Take 1 tablet (40 mg total) by mouth once daily. (Patient taking differently: Take 40 mg by mouth daily as needed.)    vitamin D (VITAMIN D3) 1000 units Tab Take 1,000 Units by mouth once daily.    zinc gluconate 50 mg tablet Take 50 mg by mouth once daily.     Family History       Problem Relation (Age of Onset)    Diabetes Mother     No Known Problems Father          Tobacco Use    Smoking status: Former     Types: Cigarettes     Quit date: 6/10/2014     Years since quittin.7    Smokeless tobacco: Never   Substance and Sexual Activity    Alcohol use: No    Drug use: No    Sexual activity: Not Currently     Review of Systems   Constitutional: Negative for diaphoresis and malaise/fatigue.   HENT: Negative.     Eyes: Negative.    Cardiovascular:  Positive for chest pain. Negative for irregular heartbeat, leg swelling, near-syncope, orthopnea, palpitations, paroxysmal nocturnal dyspnea and syncope.   Respiratory:  Positive for shortness of breath. Negative for cough.    Endocrine: Negative.    Hematologic/Lymphatic: Negative.    Skin: Negative.    Musculoskeletal:  Positive for myalgias.   Gastrointestinal:  Negative for nausea and vomiting.   Genitourinary: Negative.    Neurological:  Negative for light-headedness and weakness.   Psychiatric/Behavioral: Negative.     Allergic/Immunologic: Negative.    Objective:     Vital Signs (Most Recent):  Temp: 96.1 °F (35.6 °C) (23 1111)  Pulse: 85 (23 1210)  Resp: 18 (23 1111)  BP: 139/65 (23 1111)  SpO2: 100 % (23 1111) Vital Signs (24h Range):  Temp:  [96.1 °F (35.6 °C)-98.5 °F (36.9 °C)] 96.1 °F (35.6 °C)  Pulse:  [75-90] 85  Resp:  [16-21] 18  SpO2:  [95 %-100 %] 100 %  BP: (128-152)/(62-78) 139/65     Weight: 82.1 kg (181 lb)  Body mass index is 31.07 kg/m².    SpO2: 100 %       No intake or output data in the 24 hours ending 23 1218    Lines/Drains/Airways       Peripheral Intravenous Line  Duration                  Peripheral IV - Single Lumen 23 22 G Anterior;Distal;Right Upper Arm 1 day                    Physical Exam  Constitutional:       General: She is not in acute distress.     Appearance: She is obese. She is not diaphoretic.   HENT:      Head: Atraumatic.   Eyes:      General:         Right eye: No discharge.         Left eye: No discharge.    Cardiovascular:      Rate and Rhythm: Normal rate and regular rhythm.      Heart sounds: Murmur heard.   Pulmonary:      Effort: Pulmonary effort is normal.      Breath sounds: No rales.   Abdominal:      General: Bowel sounds are normal.      Palpations: Abdomen is soft.   Musculoskeletal:      Right lower leg: Edema present.      Left lower leg: Edema present.   Skin:     General: Skin is warm and dry.      Capillary Refill: Capillary refill takes less than 2 seconds.   Neurological:      Mental Status: She is alert and oriented to person, place, and time.   Psychiatric:         Mood and Affect: Mood normal.         Behavior: Behavior normal.         Thought Content: Thought content normal.         Judgment: Judgment normal.       Significant Labs: BMP:   Recent Labs   Lab 03/05/23  1521 03/06/23  0313   * 103    141   K 4.4 3.9    105   CO2 24 24   BUN 16 22   CREATININE 1.2 1.1   CALCIUM 10.1 9.5   , CMP   Recent Labs   Lab 03/05/23  1521 03/06/23  0313    141   K 4.4 3.9    105   CO2 24 24   * 103   BUN 16 22   CREATININE 1.2 1.1   CALCIUM 10.1 9.5   PROT 8.5* 7.3   ALBUMIN 4.6 4.0   BILITOT 0.4 0.4   ALKPHOS 134 123   AST 21 14   ALT 14 13   ANIONGAP 14 12   , CBC   Recent Labs   Lab 03/05/23  1521 03/06/23  0313   WBC 9.36 9.36   HGB 14.1 12.4   HCT 42.4 38.0    265   , INR No results for input(s): INR, PROTIME in the last 48 hours., Lipid Panel No results for input(s): CHOL, HDL, LDLCALC, TRIG, CHOLHDL in the last 48 hours., Troponin   Recent Labs   Lab 03/05/23  1521 03/05/23  1847 03/06/23  0313   TROPONINI <0.006 <0.006 <0.006   , and All pertinent lab results from the last 24 hours have been reviewed.    Significant Imaging: Echocardiogram: Transthoracic echo (TTE) complete (Cupid Only):   Results for orders placed or performed during the hospital encounter of 03/05/23   Echo   Result Value Ref Range    TDI SEPTAL 0.06 m/s    LV LATERAL E/E' RATIO 9.67 m/s     LV SEPTAL E/E' RATIO 9.67 m/s    Left Ventricular Outflow Tract Mean Velocity 0.49 cm/s    Left Ventricular Outflow Tract Mean Gradient 1.05 mmHg    TDI LATERAL 0.06 m/s    LVIDd 3.82 3.5 - 6.0 cm    IVS 0.80 0.6 - 1.1 cm    Posterior Wall 0.83 0.6 - 1.1 cm    LVIDs 2.52 2.1 - 4.0 cm    FS 34 28 - 44 %    STJ 2.60 cm    Ascending aorta 2.70 cm    LV mass 88.90 g    LA size 3.77 cm    RVDD 3.03 cm    RV S' 0.01 cm/s    Left Ventricle Relative Wall Thickness 0.43 cm    AV mean gradient 2 mmHg    AV valve area 2.79 cm2    AV Velocity Ratio 0.82     AV index (prosthetic) 0.81     MV mean gradient 1 mmHg    MV valve area by continuity eq 2.55 cm2    E/A ratio 0.61     Mean e' 0.06 m/s    E wave deceleration time 303.12 msec    IVRT 145.58 msec    Pulm vein S/D ratio 1.18     LVOT diameter 2.10 cm    LVOT area 3.5 cm2    LVOT peak daniel 0.67 m/s    LVOT peak VTI 12.00 cm    Ao peak daniel 0.82 m/s    Ao VTI 14.9 cm    LVOT stroke volume 41.54 cm3    AV peak gradient 3 mmHg    MV peak gradient 3 mmHg    E/E' ratio 9.67 m/s    MV Peak E Daniel 0.58 m/s    TR Max Daniel 1.57 m/s    MV VTI 16.3 cm    MV Peak A Daniel 0.95 m/s    PV Peak S Daniel 0.26 m/s    PV Peak D Daniel 0.22 m/s    LV Systolic Volume 13.42 mL    LV Systolic Volume Index 7.2 mL/m2    LV Diastolic Volume 34.36 mL    LV Diastolic Volume Index 18.37 mL/m2    LV Mass Index 48 g/m2    RA Major Axis 5.01 cm    Left Atrium Minor Axis 4.43 cm    Left Atrium Major Axis 4.29 cm    Triscuspid Valve Regurgitation Peak Gradient 10 mmHg    LA Volume Index (Mod) 18.9 mL/m2    LA volume (mod) 35.28 cm3    BSA 1.93 m2    LA WIDTH 3.30 cm    LA volume 46.09 cm3    LA Volume Index 24.6 mL/m2    RA Width 3.70 cm

## 2023-03-06 NOTE — PROGRESS NOTES
[facility]                    Pharmacy       Discharge Medication Education    Patient ACCEPTED medication education. Pharmacy has provided education on the name, indication, and possible side effects of the medication(s) prescribed, using teach-back method.     The following medications have also been discussed, during this admission.        Medication List        START taking these medications      LIDOcaine 5 %  Commonly known as: LIDODERM  Place 1 patch onto the skin once daily. Remove & Discard patch within 12 hours or as directed by MD for 5 days            CONTINUE taking these medications      acetaminophen 500 MG tablet  Commonly known as: TYLENOL     diclofenac sodium 1 % Gel  Commonly known as: VOLTAREN  Apply 2 g topically 4 (four) times daily as needed (pain). For muscular pain     furosemide 20 MG tablet  Commonly known as: LASIX  Take 1 tablet (20 mg total) by mouth daily as needed (edema, swelling, fluid).            STOP taking these medications      ascorbic acid (vitamin C) 500 MG tablet  Commonly known as: VITAMIN C     clobetasol 0.05% 0.05 % Oint  Commonly known as: TEMOVATE     vitamin D 1000 units Tab  Commonly known as: VITAMIN D3     zinc gluconate 50 mg tablet            ASK your doctor about these medications      metFORMIN 500 MG tablet  Commonly known as: GLUCOPHAGE  Take 1 tablet (500 mg total) by mouth 2 (two) times daily with meals.     pantoprazole 40 MG tablet  Commonly known as: PROTONIX  Take 1 tablet (40 mg total) by mouth once daily.               Where to Get Your Medications        These medications were sent to [facility]/pharmacy #[id number] - [address] AT CORNER OF [address] [address]      Phone: [phone]   LIDOcaine 5 %          Thank you  Jersey Cruz, PharmD  [phone]

## 2023-03-06 NOTE — PROGRESS NOTES
Discharge orders noted. Additional clinical references attached.    Patient's discharge instructions given by bedside RN and reviewed via this VN.  Education provided on new medication, diagnosis, and follow-up appointments.  Teach back method used. Patient verbalized understanding. All questions answered.      03/06/23 4181   AVS Confirmation   Discharge instructions and AVS given to and reviewed with patient and/or significant other. Yes

## 2023-03-06 NOTE — NURSING
Team paged to clarify home metformin and protonix meds. DR Ash states patient to Take all medications as prior and follow up with pcp for hospital visit follow up. Patient to be updated of above.

## 2023-03-06 NOTE — DISCHARGE SUMMARY
"[facility] Medicine Discharge Summary    Primary Team: [facility] Hospitalist Team B  Attending Physician: Lse Jean MD  Resident: Yamilex Antonio MD  Intern: Frandy Page MD (Nak)    Date of Admit: 3/5/2023  Date of Discharge: 3/6/2023    Discharge to: Home  Condition: Stable    Discharge Diagnoses     Patient Active Problem List   Diagnosis    Bilateral edema of lower extremity    Left shoulder pain    Debility    Anxiety    Type 2 diabetes mellitus without complication, without long-term current use of insulin    Postmenopausal    Mitral valve insufficiency    Pain of foot    Routine eye exam    Delayed immunizations    Arthritis of both knees    Gastroesophageal reflux disease    RUQ pain    Intermittent left-sided chest pain    Colon cancer screening       Consultants and Procedures     Consultants:  Cardiology, GI    Procedures:   TTE    Imaging:  See imaging    Brief History of Present Illness & Summary of Hospital Course      Aretha Holt is a 77 y.o. female with a PMHx of GERD,T2DM, Arthritis in both knees, no known cardiac hx. The patient presented on 3/5/2023 for evaluation of left-sided substernal exertional chest pain for ~1wk. In setting of recent negative stress test, EKG without ischemic changes, and troponin neg x3, very low concern for ACS. She is COLE with normal D-dimer, low concern for PE. Ddx includes anxiety, MSK pain. Additionally with acute on chronic RUQ pain s/p remote cholecystectomy with mildly increased CBD dilation in the setting of normal liver enzymes and TB. TTE ordered. No findings by Cardiology and GI that would warrant further inpt stay and cleared by both services. On the day of discharge, patient was afebrile with stable vital signs and will be discharged in stable condition to home with close follow up.     For the full HPI please refer to the History & Physical from this admission.    Discharge Vitals and Physical Exam:   Last 24 Hour Vital Signs:  BP  Min: 128/63  " "Max: 152/70  Temp  Av.7 °F (36.5 °C)  Min: 96.1 °F (35.6 °C)  Max: 98.3 °F (36.8 °C)  Pulse  Av.8  Min: 75  Max: 87  Resp  Av.3  Min: 16  Max: 21  SpO2  Av.9 %  Min: 95 %  Max: 100 %  Height  Av' 4" (162.6 cm)  Min: 5' 4" (162.6 cm)  Max: 5' 4" (162.6 cm)  Weight  Av kg (180 lb 10.7 oz)  Min: 81.6 kg (180 lb)  Max: 82.1 kg (181 lb)  Body mass index is 31.07 kg/m².  No intake/output data recorded.    Physical Examination:  General: Awake, alert, NAD.  HEENT: NCAT. EOMI intact. PERRL. MMM.   Neck: Trachea midline. No JVD.  Lungs: CTAB. No wheezing/rales/rhonchi.   CV: RRR. Normal S1/S2. No murmurs/rubs/gallops. Costosternal TTP.   Abdomen: Soft, mild RUQ TTP. + BS.   Extremities: No cyanosis, clubbing, 2+ BLE edema.   Skin: No rashes or lesions noted.   Neuro: A&O x3. CN II-XII grossly intact. Moves all extremities equally and well.       Hospital Course By Problem with Pertinent Findings     Chest Pain  Trop neg x3, BNP, and EKG unremarkable on initial w/u in ED  Cardiac work-up last month by Dr Pierre showed LVEF ~60%-65% w/ mild mitral regurg w/ neg stress test for ischemia.   Cardiology consulted w/ ordered echo, which had an unremarkable read  Follow-up outpatient w/ Cardiology     Leg Swelling  Hx of bilateral OA in knees w/ trace pedal edema  Continue home Lasix and continue wearing compression stockings  Follow-up outpatient w/ PCP     GERD  RUQ U/S showed CBD dilation (cholecystectomy v obstruction)  Continue home PPI  Consulted GI, which they stated pt had expected findings post cholecystectomy and does not require acute management inpt.  Monitor outpatient w/ PCP     T2DM  Pending HbA1c, can be followed out-patient  Follow-up outpatient w/ PCP    Discharge Medications        Medication List        START taking these medications      LIDOcaine 5 %  Commonly known as: LIDODERM  Place 1 patch onto the skin once daily. Remove & Discard patch within 12 hours or as directed by MD for 5 " "days            CONTINUE taking these medications      acetaminophen 500 MG tablet  Commonly known as: TYLENOL     diclofenac sodium 1 % Gel  Commonly known as: VOLTAREN  Apply 2 g topically 4 (four) times daily as needed (pain). For muscular pain     furosemide 20 MG tablet  Commonly known as: LASIX  Take 1 tablet (20 mg total) by mouth daily as needed (edema, swelling, fluid).            STOP taking these medications      ascorbic acid (vitamin C) 500 MG tablet  Commonly known as: VITAMIN C     clobetasol 0.05% 0.05 % Oint  Commonly known as: TEMOVATE     vitamin D 1000 units Tab  Commonly known as: VITAMIN D3     zinc gluconate 50 mg tablet            ASK your doctor about these medications      metFORMIN 500 MG tablet  Commonly known as: GLUCOPHAGE  Take 1 tablet (500 mg total) by mouth 2 (two) times daily with meals.     pantoprazole 40 MG tablet  Commonly known as: PROTONIX  Take 1 tablet (40 mg total) by mouth once daily.               Where to Get Your Medications        These medications were sent to [facility]/pharmacy #[id number] [address] AT CORNER OF [address] [address]      Phone: [phone]   LIDOcaine 5 %          Discharge Information:   Diet:  Diabetic    Physical Activity:  As tolerated             Instructions:  1. Take all medications as prescribed  2. Keep all follow-up appointments  3. Return to the hospital or call your primary care physicians if any worsening symptoms such as fever, chest pain, shortness of breath, return of symptoms, or any other concerns.    Follow-Up Appointments:  Ortho Dr Drew 3/15/23  Podiatry Dr Castle 3/22/23  Ophthalmology 4/5/23  PCP Dr Rivas 9/1/23  Cardiology Dr Pierre 10/2/23  GI for C-scope (pending date)    Frandy "Chadwick" MD Kaylee  [facility], PGY-1   "

## 2023-03-06 NOTE — CONSULTS
"[facility]    CC: abnormal imaging of GI tract    HPI 77 y.o. female with PMH significant for DM, GERD GI consulted for new, biliary related, 1 day prior to admit abnormal imaging of GI tract associated with past cholecystectomy. Denies NSAID use.      Past Medical History  Past Medical History:   Diagnosis Date    Arthritis of both knees 2023    Cystitis 2021    Diabetes mellitus     Gastroesophageal reflux disease 2023    Type 2 diabetes mellitus without complication 2021       Past Surgical History  Past Surgical History:   Procedure Laterality Date     SECTION      CHOLECYSTECTOMY      CHOLECYSTECTOMY         Social History  Social History     Tobacco Use    Smoking status: Former     Types: Cigarettes     Quit date: 6/10/2014     Years since quittin.7    Smokeless tobacco: Never   Substance Use Topics    Alcohol use: No    Drug use: No       Family History  Family History   Problem Relation Age of Onset    Diabetes Mother     No Known Problems Father        Review of Systems  General ROS: +fatigue, chills. negative for fever or weight loss  Psychological ROS: +anxiety. negative for hallucination, depression, or suicidal ideation  Ophthalmic ROS: negative for blurry vision, photophobia or eye pain  ENT ROS: negative for epistaxis, sore throat or rhinorrhea  Respiratory ROS: +shortness of breath. no cough or wheezing  Cardiovascular ROS: +chest pain and dyspnea on exertion  Gastrointestinal ROS: no abdominal pain, change in bowel habits, or black/ bloody stools  Musculoskeletal ROS: +generalized muscular weakness. negative for gait disturbance  Neurological ROS: no syncope or seizures; no ataxia  Dermatological ROS: negative for pruritis, rash and jaundice    Physical Examination  /65 (BP Location: Right arm, Patient Position: Lying)   Pulse 85   Temp 96.1 °F (35.6 °C) (Oral)   Resp 18   Ht 5' 4" (1.626 m)   Wt 82.1 kg (181 lb)   SpO2 100%   BMI 31.07 kg/m² "   General appearance: alert, cooperative, mild distress  HENT: Normocephalic, atraumatic, neck symmetrical, no nasal discharge   Eyes: no scleral icterus, PERRL, EOM's intact  Cardiothoracic: regular rate and without rub; no displacement of the PMI. Tender left, mid-chest wall  Abdomen: soft, non-tender; non-distended, dullness to percussion, bowel sounds normoactive; no organomegaly  Extremities: 2+ BLE edmea. no clubbing, or cyanosis  Integument: color, texture, turgor normal; no rashes; hair distrubution normal  Neurologic: Alert and oriented X 3, normal sensation, normal coordination  Psychiatric: no pressured speech; normal affect; no evidence of impaired cognition     Assessment:   77 y.o. female with PMH significant for DM, GERD admitted for chest pain. Endo: denies and none in EMR. GI consulted for:    #Abnormal imaging of GI tract  #Common bile duct of 6.8 mm - this is normal given history of cholecystectomy and age    Afebrile, not tachycardic. CMP unremarkable.    Plan:   -do not advise further work up for CBD findings on U/S  -will need at least one time colon cancer screening which can be done outpatient  -supportive care otherwise        Attestation to follow which may differ from above plan. Please appreciate.    Ernesto Peck MD  [facility]

## 2023-03-06 NOTE — PROGRESS NOTES
[facility] Medicine Progress Note    Primary Team: [facility] Hospitalist Team B  Attending Physician: Les Jean MD  Resident: Paco  Intern: Kaylee    Subjective:      Reports chest is  this AM but denies SOB, palpitations.      Objective:     Last 24 Hour Vital Signs:  BP  Min: 128/63  Max: 152/70  Temp  Av.3 °F (36.8 °C)  Min: 98 °F (36.7 °C)  Max: 98.5 °F (36.9 °C)  Pulse  Av.9  Min: 75  Max: 90  Resp  Av.6  Min: 16  Max: 21  SpO2  Av.7 %  Min: 95 %  Max: 97 %  Weight  Av.6 kg (180 lb)  Min: 81.6 kg (180 lb)  Max: 81.6 kg (180 lb)  No intake/output data recorded.    Physical Examination:  General: Awake, alert, NAD.  HEENT: NCAT. EOMI intact. PERRL. MMM.   Neck: Trachea midline. No JVD.  Lungs: CTAB. No wheezing/rales/rhonchi.   CV: RRR. Normal S1/S2. No murmurs/rubs/gallops. Costosternal TTP.   Abdomen: Soft, mild RUQ TTP. + BS.   Extremities: No cyanosis, clubbing, 2+ BLE edema.   Skin: No rashes or lesions noted.   Neuro: A&O x3. CN II-XII grossly intact. Moves all extremities equally and well.       Laboratory:  Laboratory Data Reviewed: yes  Pertinent Findings:  Trop < 0.006 x3  D-dimer 0.29  BNP < 10    Microbiology Data Reviewed: yes  Pertinent Findings:  COVID neg    Other Results:  EKG (my interpretation): none new    Radiology Data Reviewed: yes  Pertinent Findings:  CXR: no acute cardiopulmonary process  Abdominal US: Increased diameter of the common bile duct previously 5 mm now 6.8 mm.  While this could represent capacity of change in patient with cholecystectomy, biliary obstruction is not excluded sonographically.    Current Medications:     Infusions:       Scheduled:   regadenoson  0.4 mg Intravenous Once        PRN:  dextrose 10%, dextrose 10%, dextrose, dextrose, diclofenac sodium, furosemide, glucagon (human recombinant), naloxone, sodium chloride 0.9%    Antibiotics and Day Number of Therapy:  none    Lines and Day Number of  Therapy:  PIV    Assessment/Plan:     Aretha Holt is a 77 y.o. female with a PMHx of GERD,T2DM, Arthritis in both knees, no known cardiac hx. The patient presented on 3/5/2023 for evaluation of left-sided substernal exertional chest pain for ~1wk. In setting of recent negative stress test, EKG without ischemic changes, and troponin neg x3, very low concern for ACS. She is COLE with normal D-dimer, low concern for PE. Ddx includes anxiety, MSK pain. Additionally with acute on chronic RUQ pain s/p remote cholecystectomy with mildly increased CBD dilation in the setting of normal liver enzymes and TB. Pending TTE and GI input.      Chest Pain  Trop neg x3, BNP, and EKG unremarkable on initial w/u in ED  Cardiac work-up last month by Dr Pierre showed LVEF ~60%-65% w/ mild mitral regurg w/ neg stress test for ischemia.   Echo ordered  Cardiology consulted; appreciate recs     Leg Swelling  Hx of bilateral OA in knees w/ trace pedal edema  Continue home Lasix     GERD  RUQ U/S showed CBD dilation (cholecystectomy v obstruction)  Continue home PPI  GI consulted; appreciate recs     T2DM  Pending HbA1c  SSI    Yamilex Antonio MD  [facility] PGY-3     [facility] Hospitalist Pager numbers:   [facility] Medicine Team A (Corin/Ghanshyam): [phone]  [facility] Medicine Team B (Ted/Charles):  [phone]

## 2023-03-06 NOTE — PHARMACY MED REC
"  Admission Medication History     The home medication history was taken by Landy Pandey CPhT.    Medication history obtained from, Patient Verified    You may go to "Admission" then "Reconcile Home Medications" tabs to review and/or act upon these items.     The home medication list has been updated by the Pharmacy department.   Please read ALL comments highlighted in yellow.   Please address this information as you see fit.    Feel free to contact us if you have any questions or require assistance.      The medications listed below were removed from the home medication list.  Please reorder if appropriate:  Patient reports no longer taking the following medication(s):  Multivitamin         Landy Pandey CPhT.  Ext [phone]             .        "

## 2023-03-06 NOTE — ASSESSMENT & PLAN NOTE
Pain is reproducible on palpation   No elevation in troponin  No ischemic EKG changes  TTE pending for completeness      Stress test in 08/22 negative for ischemia  02/2022 TTE with normal LVEF  Low suspicion for ischemic chest pain at this time

## 2023-03-06 NOTE — ED NOTES
Pt resting comfortably and independently repositioned in stretcher with bed locked in lowest position for safety. NAD and patient denies pain at this time. Respirations even and unlabored and visible chest rise noted. Patient offered bathroom assistance and denies need at this time. Pt instructed to call if assistance is needed. Pt on continuous cardiac, BP, and O2 monitoring. No needs at this time. Will continue to monitor. Call light within reach

## 2023-03-06 NOTE — HPI
Aretha Holt is a 77 y.o. female with GERD,T2DM, Arthritis in both knees. Patient presented to the ED with chest pain x 1 week. Pain worsened by wearing a bra and palpation. Patient reports the pain is intermittent, sharp, and has not worsened. Nothing has helped her pain and she reports associated feeling of no being able to catch her breath. Patient denies palpitations, diaphoresis, fatigue, NV, worsened edema. She has had similar pain in the past and had a stress test which was negative in 08/2022 as well as an echo which was without LVEF depression or WMA. Troponin and EKG negative,  RUQ U/S showed CBD dilation (cholecystectomy v obstruction).  Patient has a TTE pending.

## 2023-03-06 NOTE — DISCHARGE INSTRUCTIONS
Your cardiac enzymes, EKG, and echocardiogram (heart ultrasound) were all reassuring. We do not believe that your chest pain is due to any kind of heart problem. It is most likely musculoskeletal pain, which can sometimes make it hurt to take deep breaths. You can use Tylenol/ibuprofen for the pain, as well as lidocaine patches.     Follow-up appointments are below:  Ortho Dr Drew 3/15/23  Podiatry Dr Castle 3/22/23  Ophthalmology 4/5/23  PCP Dr Rivas 9/1/23  Cardiology Dr Pierre 10/2/23  GI for C-scope (pending date)    For clarity on details or request for logistical changes, please use [facility] for details about each respective appointments and they can be messaged or called for any questions/concerns pertaining to your appointments.

## 2023-03-06 NOTE — CONSULTS
[facility] - Telemetry  Cardiology  Consult Note    Patient Name: Aretha Holt  MRN: [id number]  Admission Date: 3/5/2023  Hospital Length of Stay: 0 days  Code Status: Full Code   Attending Provider: Les Jean MD   Consulting Provider: Nigel Gipson NP  Primary Care Physician: Jhonny Rivas MD  Principal Problem:Intermittent left-sided chest pain    Patient information was obtained from patient, past medical records and ER records.     Inpatient consult to [facility]  Consult performed by: Nigel Gipson NP  Consult ordered by: Yamilex Roman MD        Subjective:     Chief Complaint:  Chest pain     HPI:   Aretha Holt is a 77 y.o. female with GERD,T2DM, Arthritis in both knees. Patient presented to the ED with chest pain x 1 week. Pain worsened by wearing a bra and palpation. Patient reports the pain is intermittent, sharp, and has not worsened. Nothing has helped her pain and she reports associated feeling of no being able to catch her breath. Patient denies palpitations, diaphoresis, fatigue, NV, worsened edema. She has had similar pain in the past and had a stress test which was negative in 2022 as well as an echo which was without LVEF depression or WMA. Troponin and EKG negative,  RUQ U/S showed CBD dilation (cholecystectomy v obstruction).  Patient has a TTE pending.       Past Medical History:   Diagnosis Date    Arthritis of both knees 2023    Cystitis 2021    Diabetes mellitus     Gastroesophageal reflux disease 2023    Type 2 diabetes mellitus without complication 2021       Past Surgical History:   Procedure Laterality Date     SECTION      CHOLECYSTECTOMY      CHOLECYSTECTOMY         Review of patient's allergies indicates:   Allergen Reactions    Decadron [dexamethasone] Other (See Comments)     Syncope (causes spike in blood glucose with previous bout of syncope)    Celestone [betamethasone sodium phosphate]      Celestone [betamethasone] Other (See Comments)     Syncope (causes spike in blood glucose with previous bout of syncope)    Decadron-la        No current facility-administered medications on file prior to encounter.     Current Outpatient Medications on File Prior to Encounter   Medication Sig    acetaminophen (TYLENOL) 500 MG tablet Take 500 mg by mouth every 6 (six) hours as needed for Pain.    ascorbic acid, vitamin C, (VITAMIN C) 500 MG tablet Take 500 mg by mouth once daily.    clobetasol 0.05% (TEMOVATE) 0.05 % Oint Apply topically daily as needed.    diclofenac sodium (VOLTAREN) 1 % Gel Apply 2 g topically 4 (four) times daily as needed (pain). For muscular pain    furosemide (LASIX) 20 MG tablet Take 1 tablet (20 mg total) by mouth daily as needed (edema, swelling, fluid).    metFORMIN (GLUCOPHAGE) 500 MG tablet Take 1 tablet (500 mg total) by mouth 2 (two) times daily with meals. (Patient taking differently: Take 1,000 mg by mouth 2 (two) times daily with meals.)    pantoprazole (PROTONIX) 40 MG tablet Take 1 tablet (40 mg total) by mouth once daily. (Patient taking differently: Take 40 mg by mouth daily as needed.)    vitamin D (VITAMIN D3) 1000 units Tab Take 1,000 Units by mouth once daily.    zinc gluconate 50 mg tablet Take 50 mg by mouth once daily.     Family History       Problem Relation (Age of Onset)    Diabetes Mother    No Known Problems Father          Tobacco Use    Smoking status: Former     Types: Cigarettes     Quit date: 6/10/2014     Years since quittin.7    Smokeless tobacco: Never   Substance and Sexual Activity    Alcohol use: No    Drug use: No    Sexual activity: Not Currently     Review of Systems   Constitutional: Negative for diaphoresis and malaise/fatigue.   HENT: Negative.     Eyes: Negative.    Cardiovascular:  Positive for chest pain. Negative for irregular heartbeat, leg swelling, near-syncope, orthopnea, palpitations, paroxysmal nocturnal dyspnea and  syncope.   Respiratory:  Positive for shortness of breath. Negative for cough.    Endocrine: Negative.    Hematologic/Lymphatic: Negative.    Skin: Negative.    Musculoskeletal:  Positive for myalgias.   Gastrointestinal:  Negative for nausea and vomiting.   Genitourinary: Negative.    Neurological:  Negative for light-headedness and weakness.   Psychiatric/Behavioral: Negative.     Allergic/Immunologic: Negative.    Objective:     Vital Signs (Most Recent):  Temp: 96.1 °F (35.6 °C) (03/06/23 1111)  Pulse: 85 (03/06/23 1210)  Resp: 18 (03/06/23 1111)  BP: 139/65 (03/06/23 1111)  SpO2: 100 % (03/06/23 1111) Vital Signs (24h Range):  Temp:  [96.1 °F (35.6 °C)-98.5 °F (36.9 °C)] 96.1 °F (35.6 °C)  Pulse:  [75-90] 85  Resp:  [16-21] 18  SpO2:  [95 %-100 %] 100 %  BP: (128-152)/(62-78) 139/65     Weight: 82.1 kg (181 lb)  Body mass index is 31.07 kg/m².    SpO2: 100 %       No intake or output data in the 24 hours ending 03/06/23 1218    Lines/Drains/Airways       Peripheral Intravenous Line  Duration                  Peripheral IV - Single Lumen 03/05/23 22 G Anterior;Distal;Right Upper Arm 1 day                    Physical Exam  Constitutional:       General: She is not in acute distress.     Appearance: She is obese. She is not diaphoretic.   HENT:      Head: Atraumatic.   Eyes:      General:         Right eye: No discharge.         Left eye: No discharge.   Cardiovascular:      Rate and Rhythm: Normal rate and regular rhythm.      Heart sounds: Murmur heard.   Pulmonary:      Effort: Pulmonary effort is normal.      Breath sounds: No rales.   Abdominal:      General: Bowel sounds are normal.      Palpations: Abdomen is soft.   Musculoskeletal:      Right lower leg: Edema present.      Left lower leg: Edema present.   Skin:     General: Skin is warm and dry.      Capillary Refill: Capillary refill takes less than 2 seconds.   Neurological:      Mental Status: She is alert and oriented to person, place, and time.    Psychiatric:         Mood and Affect: Mood normal.         Behavior: Behavior normal.         Thought Content: Thought content normal.         Judgment: Judgment normal.       Significant Labs: BMP:   Recent Labs   Lab 03/05/23  1521 03/06/23  0313   * 103    141   K 4.4 3.9    105   CO2 24 24   BUN 16 22   CREATININE 1.2 1.1   CALCIUM 10.1 9.5   , CMP   Recent Labs   Lab 03/05/23  1521 03/06/23  0313    141   K 4.4 3.9    105   CO2 24 24   * 103   BUN 16 22   CREATININE 1.2 1.1   CALCIUM 10.1 9.5   PROT 8.5* 7.3   ALBUMIN 4.6 4.0   BILITOT 0.4 0.4   ALKPHOS 134 123   AST 21 14   ALT 14 13   ANIONGAP 14 12   , CBC   Recent Labs   Lab 03/05/23  1521 03/06/23  0313   WBC 9.36 9.36   HGB 14.1 12.4   HCT 42.4 38.0    265   , INR No results for input(s): INR, PROTIME in the last 48 hours., Lipid Panel No results for input(s): CHOL, HDL, LDLCALC, TRIG, CHOLHDL in the last 48 hours., Troponin   Recent Labs   Lab 03/05/23  1521 03/05/23  1847 03/06/23  0313   TROPONINI <0.006 <0.006 <0.006   , and All pertinent lab results from the last 24 hours have been reviewed.    Significant Imaging: Echocardiogram: Transthoracic echo (TTE) complete ([facility] Only):   Results for orders placed or performed during the hospital encounter of 03/05/23   Echo   Result Value Ref Range    TDI SEPTAL 0.06 m/s    LV LATERAL E/E' RATIO 9.67 m/s    LV SEPTAL E/E' RATIO 9.67 m/s    Left Ventricular Outflow Tract Mean Velocity 0.49 cm/s    Left Ventricular Outflow Tract Mean Gradient 1.05 mmHg    TDI LATERAL 0.06 m/s    LVIDd 3.82 3.5 - 6.0 cm    IVS 0.80 0.6 - 1.1 cm    Posterior Wall 0.83 0.6 - 1.1 cm    LVIDs 2.52 2.1 - 4.0 cm    FS 34 28 - 44 %    STJ 2.60 cm    Ascending aorta 2.70 cm    LV mass 88.90 g    LA size 3.77 cm    RVDD 3.03 cm    RV S' 0.01 cm/s    Left Ventricle Relative Wall Thickness 0.43 cm    AV mean gradient 2 mmHg    AV valve area 2.79 cm2    AV Velocity Ratio 0.82     AV index  (prosthetic) 0.81     MV mean gradient 1 mmHg    MV valve area by continuity eq 2.55 cm2    E/A ratio 0.61     Mean e' 0.06 m/s    E wave deceleration time 303.12 msec    IVRT 145.58 msec    Pulm vein S/D ratio 1.18     LVOT diameter 2.10 cm    LVOT area 3.5 cm2    LVOT peak daniel 0.67 m/s    LVOT peak VTI 12.00 cm    Ao peak daniel 0.82 m/s    Ao VTI 14.9 cm    LVOT stroke volume 41.54 cm3    AV peak gradient 3 mmHg    MV peak gradient 3 mmHg    E/E' ratio 9.67 m/s    MV Peak E Daniel 0.58 m/s    TR Max Daniel 1.57 m/s    MV VTI 16.3 cm    MV Peak A Daniel 0.95 m/s    PV Peak S Daniel 0.26 m/s    PV Peak D Daniel 0.22 m/s    LV Systolic Volume 13.42 mL    LV Systolic Volume Index 7.2 mL/m2    LV Diastolic Volume 34.36 mL    LV Diastolic Volume Index 18.37 mL/m2    LV Mass Index 48 g/m2    RA Major Axis 5.01 cm    Left Atrium Minor Axis 4.43 cm    Left Atrium Major Axis 4.29 cm    Triscuspid Valve Regurgitation Peak Gradient 10 mmHg    LA Volume Index (Mod) 18.9 mL/m2    LA volume (mod) 35.28 cm3    BSA 1.93 m2    LA WIDTH 3.30 cm    LA volume 46.09 cm3    LA Volume Index 24.6 mL/m2    RA Width 3.70 cm     Assessment and Plan:     * Intermittent left-sided chest pain  Pain is reproducible on palpation   No elevation in troponin  No ischemic EKG changes  TTE pending for completeness      Stress test in 08/22 negative for ischemia  02/2022 TTE with normal LVEF  Low suspicion for ischemic chest pain at this time     Bilateral edema of lower extremity  Chronic  Continue Lasix         VTE Risk Mitigation (From admission, onward)         Ordered     Reason for No Pharmacological VTE Prophylaxis  Once        Question:  Reasons:  Answer:  Patient is Ambulatory    03/05/23 2035     IP VTE HIGH RISK PATIENT  Once         03/05/23 2035     Place sequential compression device  Until discontinued         03/05/23 2035                Thank you for your consult. I will follow-up with patient. Please contact us if you have any additional  questions.    Nigel Gipson NP  Cardiology   [facility] - Telemetry

## 2023-03-07 ENCOUNTER — TELEPHONE (OUTPATIENT)
Dept: SURGERY | Facility: HOSPITAL | Age: 78
End: 2023-03-07
Payer: MEDICARE

## 2023-03-07 NOTE — TELEPHONE ENCOUNTER
RE: GI Call Colonoscopy     Spoke with patient regarding referral for colonoscopy.  Call outcome: patient refuse scheduling at this time.     Call time: 1 minutes

## 2023-03-08 ENCOUNTER — PATIENT MESSAGE (OUTPATIENT)
Dept: ORTHOPEDICS | Facility: CLINIC | Age: 78
End: 2023-03-08
Payer: MEDICARE

## 2023-03-09 ENCOUNTER — TELEPHONE (OUTPATIENT)
Dept: OPHTHALMOLOGY | Facility: CLINIC | Age: 78
End: 2023-03-09
Payer: MEDICARE

## 2023-03-09 ENCOUNTER — TELEPHONE (OUTPATIENT)
Dept: SPORTS MEDICINE | Facility: CLINIC | Age: 78
End: 2023-03-09
Payer: MEDICARE

## 2023-03-09 NOTE — TELEPHONE ENCOUNTER
Spoke with pt to r/s appt with Dr. Drew on 3/15 as provider will not be at [facility] that day. Pt stated she is no longer having knee pain. Pt requested to cancel appt.

## 2023-03-09 NOTE — TELEPHONE ENCOUNTER
----- Message from Madison Urbano sent at 3/9/2023  3:16 PM CST -----  Contact: Aretha Santos is calling in regards to her getting her appt on 04/05/2023 rescheduled in [facility].She stated that she have moved back home to [place].Please call back at [phone]          Thanks  CHARLEY

## 2023-03-15 ENCOUNTER — TELEPHONE (OUTPATIENT)
Dept: PRIMARY CARE CLINIC | Facility: CLINIC | Age: 78
End: 2023-03-15
Payer: MEDICARE

## 2023-03-15 NOTE — TELEPHONE ENCOUNTER
----- Message from Aurelia Hung sent at 3/14/2023  6:08 PM CDT -----  Type: General Call Back     Name of Caller:DEANNA GUERRERO [[id number]]  Symptoms:ED follow up   Would the patient rather a call back or a response via [facility]? Call back   Best Call Back Number:[phone]  Additional Information: Patient   indicates she would like to reschedule that appointment for next week if possible 3/20/23-3/24/23. Patient is currently scheduled for 3/17/23. Patient indicates she relies on others for transportation. Patient wants to reschedule for the soonest appointment possible. Please call back with further assistance.

## 2023-03-16 ENCOUNTER — HOSPITAL ENCOUNTER (OUTPATIENT)
Dept: CARDIOLOGY | Facility: HOSPITAL | Age: 78
Discharge: HOME | End: 2023-03-16
Attending: INTERNAL MEDICINE
Payer: MEDICARE

## 2023-03-16 DIAGNOSIS — R60.0 BILATERAL EDEMA OF LOWER EXTREMITY: ICD-10-CM

## 2023-03-16 PROCEDURE — 93970 CV US DOPPLER VENOUS LEGS BILATERAL (CUPID ONLY): ICD-10-PCS | Mod: 26,,, | Performed by: INTERNAL MEDICINE

## 2023-03-16 PROCEDURE — 93970 EXTREMITY STUDY: CPT

## 2023-03-16 PROCEDURE — 93970 EXTREMITY STUDY: CPT | Mod: 26,,, | Performed by: INTERNAL MEDICINE

## 2023-04-05 ENCOUNTER — TELEPHONE (OUTPATIENT)
Dept: INTERNAL MEDICINE | Facility: CLINIC | Age: 78
End: 2023-04-05
Payer: MEDICARE

## 2023-04-05 NOTE — TELEPHONE ENCOUNTER
----- Message from Jessica Grace sent at 4/5/2023  2:25 PM CDT -----  Pt would like a call back regarding test results and she is feeling week. Call back number is .[phone]. Thx. EL

## 2023-04-06 ENCOUNTER — TELEPHONE (OUTPATIENT)
Dept: INTERNAL MEDICINE | Facility: CLINIC | Age: 78
End: 2023-04-06
Payer: MEDICARE

## 2023-04-06 ENCOUNTER — HOSPITAL ENCOUNTER (EMERGENCY)
Facility: HOSPITAL | Age: 78
Discharge: HOME | End: 2023-04-06
Attending: EMERGENCY MEDICINE
Payer: MEDICARE

## 2023-04-06 VITALS
HEART RATE: 94 BPM | TEMPERATURE: 98 F | OXYGEN SATURATION: 96 % | DIASTOLIC BLOOD PRESSURE: 76 MMHG | BODY MASS INDEX: 30.9 KG/M2 | RESPIRATION RATE: 18 BRPM | SYSTOLIC BLOOD PRESSURE: 135 MMHG | WEIGHT: 180 LBS

## 2023-04-06 DIAGNOSIS — R53.83 FATIGUE, UNSPECIFIED TYPE: Primary | ICD-10-CM

## 2023-04-06 DIAGNOSIS — R07.9 CHEST PAIN: ICD-10-CM

## 2023-04-06 LAB
ALBUMIN SERPL BCP-MCNC: 4.4 G/DL (ref 3.5–5.2)
ALP SERPL-CCNC: 137 U/L (ref 55–135)
ALT SERPL W/O P-5'-P-CCNC: 12 U/L (ref 10–44)
ANION GAP SERPL CALC-SCNC: 12 MMOL/L (ref 8–16)
AST SERPL-CCNC: 16 U/L (ref 10–40)
BACTERIA #/AREA URNS HPF: ABNORMAL /HPF
BASOPHILS # BLD AUTO: 0.04 K/UL (ref 0–0.2)
BASOPHILS NFR BLD: 0.4 % (ref 0–1.9)
BILIRUB SERPL-MCNC: 0.4 MG/DL (ref 0.1–1)
BILIRUB UR QL STRIP: NEGATIVE
BNP SERPL-MCNC: <10 PG/ML (ref 0–99)
BUN SERPL-MCNC: 17 MG/DL (ref 8–23)
CALCIUM SERPL-MCNC: 9.3 MG/DL (ref 8.7–10.5)
CHLORIDE SERPL-SCNC: 106 MMOL/L (ref 95–110)
CLARITY UR: CLEAR
CO2 SERPL-SCNC: 22 MMOL/L (ref 23–29)
COLOR UR: YELLOW
CREAT SERPL-MCNC: 1 MG/DL (ref 0.5–1.4)
DIFFERENTIAL METHOD: ABNORMAL
EOSINOPHIL # BLD AUTO: 0.2 K/UL (ref 0–0.5)
EOSINOPHIL NFR BLD: 1.6 % (ref 0–8)
ERYTHROCYTE [DISTWIDTH] IN BLOOD BY AUTOMATED COUNT: 13.2 % (ref 11.5–14.5)
EST. GFR  (NO RACE VARIABLE): 58 ML/MIN/1.73 M^2
GLUCOSE SERPL-MCNC: 119 MG/DL (ref 70–110)
GLUCOSE UR QL STRIP: NEGATIVE
HCT VFR BLD AUTO: 41.5 % (ref 37–48.5)
HGB BLD-MCNC: 13.8 G/DL (ref 12–16)
HGB UR QL STRIP: NEGATIVE
HYALINE CASTS #/AREA URNS LPF: 4 /LPF
IMM GRANULOCYTES # BLD AUTO: 0.03 K/UL (ref 0–0.04)
IMM GRANULOCYTES NFR BLD AUTO: 0.3 % (ref 0–0.5)
KETONES UR QL STRIP: NEGATIVE
LEUKOCYTE ESTERASE UR QL STRIP: ABNORMAL
LYMPHOCYTES # BLD AUTO: 3 K/UL (ref 1–4.8)
LYMPHOCYTES NFR BLD: 31.2 % (ref 18–48)
MAGNESIUM SERPL-MCNC: 2 MG/DL (ref 1.6–2.6)
MCH RBC QN AUTO: 30.3 PG (ref 27–31)
MCHC RBC AUTO-ENTMCNC: 33.3 G/DL (ref 32–36)
MCV RBC AUTO: 91 FL (ref 82–98)
MICROSCOPIC COMMENT: ABNORMAL
MONOCYTES # BLD AUTO: 0.7 K/UL (ref 0.3–1)
MONOCYTES NFR BLD: 6.8 % (ref 4–15)
NEUTROPHILS # BLD AUTO: 5.7 K/UL (ref 1.8–7.7)
NEUTROPHILS NFR BLD: 59.7 % (ref 38–73)
NITRITE UR QL STRIP: NEGATIVE
NRBC BLD-RTO: 0 /100 WBC
PH UR STRIP: 5 [PH] (ref 5–8)
PLATELET # BLD AUTO: 315 K/UL (ref 150–450)
PMV BLD AUTO: 8.8 FL (ref 9.2–12.9)
POTASSIUM SERPL-SCNC: 4.2 MMOL/L (ref 3.5–5.1)
PROT SERPL-MCNC: 7.8 G/DL (ref 6–8.4)
PROT UR QL STRIP: NEGATIVE
RBC # BLD AUTO: 4.56 M/UL (ref 4–5.4)
RBC #/AREA URNS HPF: 2 /HPF (ref 0–4)
SODIUM SERPL-SCNC: 140 MMOL/L (ref 136–145)
SP GR UR STRIP: 1.01 (ref 1–1.03)
SQUAMOUS #/AREA URNS HPF: 3 /HPF
TROPONIN I SERPL DL<=0.01 NG/ML-MCNC: <0.006 NG/ML (ref 0–0.03)
UNIDENT CRYS URNS QL MICRO: 1
URN SPEC COLLECT METH UR: ABNORMAL
UROBILINOGEN UR STRIP-ACNC: NEGATIVE EU/DL
WBC # BLD AUTO: 9.55 K/UL (ref 3.9–12.7)
WBC #/AREA URNS HPF: 3 /HPF (ref 0–5)

## 2023-04-06 PROCEDURE — 80053 COMPREHEN METABOLIC PANEL: CPT | Performed by: PHYSICIAN ASSISTANT

## 2023-04-06 PROCEDURE — 93010 ELECTROCARDIOGRAM REPORT: CPT | Mod: ,,, | Performed by: INTERNAL MEDICINE

## 2023-04-06 PROCEDURE — 83880 ASSAY OF NATRIURETIC PEPTIDE: CPT | Performed by: PHYSICIAN ASSISTANT

## 2023-04-06 PROCEDURE — 93005 ELECTROCARDIOGRAM TRACING: CPT

## 2023-04-06 PROCEDURE — 99285 EMERGENCY DEPT VISIT HI MDM: CPT | Mod: 25

## 2023-04-06 PROCEDURE — 63600175 PHARM REV CODE 636 W HCPCS: Performed by: EMERGENCY MEDICINE

## 2023-04-06 PROCEDURE — 81000 URINALYSIS NONAUTO W/SCOPE: CPT | Performed by: PHYSICIAN ASSISTANT

## 2023-04-06 PROCEDURE — 83735 ASSAY OF MAGNESIUM: CPT | Performed by: PHYSICIAN ASSISTANT

## 2023-04-06 PROCEDURE — 93010 EKG 12-LEAD: ICD-10-PCS | Mod: ,,, | Performed by: INTERNAL MEDICINE

## 2023-04-06 PROCEDURE — 96374 THER/PROPH/DIAG INJ IV PUSH: CPT

## 2023-04-06 PROCEDURE — 85025 COMPLETE CBC W/AUTO DIFF WBC: CPT | Performed by: PHYSICIAN ASSISTANT

## 2023-04-06 PROCEDURE — 25000003 PHARM REV CODE 250: Performed by: EMERGENCY MEDICINE

## 2023-04-06 PROCEDURE — 84484 ASSAY OF TROPONIN QUANT: CPT | Performed by: PHYSICIAN ASSISTANT

## 2023-04-06 RX ORDER — LIDOCAINE 50 MG/G
1 PATCH TOPICAL
Status: DISCONTINUED | OUTPATIENT
Start: 2023-04-06 | End: 2023-04-06 | Stop reason: HOSPADM

## 2023-04-06 RX ORDER — ACETAMINOPHEN 500 MG
1000 TABLET ORAL
Status: COMPLETED | OUTPATIENT
Start: 2023-04-06 | End: 2023-04-06

## 2023-04-06 RX ORDER — KETOROLAC TROMETHAMINE 30 MG/ML
15 INJECTION, SOLUTION INTRAMUSCULAR; INTRAVENOUS
Status: COMPLETED | OUTPATIENT
Start: 2023-04-06 | End: 2023-04-06

## 2023-04-06 RX ORDER — LIDOCAINE 50 MG/G
1 PATCH TOPICAL DAILY
Qty: 15 PATCH | Refills: 0 | Status: SHIPPED | OUTPATIENT
Start: 2023-04-06 | End: 2023-04-21

## 2023-04-06 RX ADMIN — LIDOCAINE 1 PATCH: 50 PATCH CUTANEOUS at 03:04

## 2023-04-06 RX ADMIN — KETOROLAC TROMETHAMINE 15 MG: 30 INJECTION, SOLUTION INTRAMUSCULAR; INTRAVENOUS at 03:04

## 2023-04-06 RX ADMIN — ACETAMINOPHEN 1000 MG: 500 TABLET ORAL at 03:04

## 2023-04-06 NOTE — ED PROVIDER NOTES
Encounter Date: 2023       History     Chief Complaint   Patient presents with    Weakness     Pt c/o increased fatigue and weakness for a few days. Pt has also had intermittent left sided chest pain with shortness of breath with exertion. Chest pain does not radiate. Pt also would like to get a new pcp closer to her home in [place].      77 y.o. female with a PMHx of GERD,T2DM, Arthritis in both knees presents with complaint of generalized fatigue.  Patient said over the past several days she was felt more tired than normal so she came to the emergency department.  Also complains of intermittent episodes of left-sided chest pain that are worse with movement.  Denies shortness of breath, fever, chills, nausea, vomiting, diarrhea.  Says that she was trying to schedule a primary care doctor that is closer to this area.    The history is provided by the patient. No  was used.   Review of patient's allergies indicates:   Allergen Reactions    Decadron [dexamethasone] Other (See Comments)     Syncope (causes spike in blood glucose with previous bout of syncope)    Celestone [betamethasone sodium phosphate]     Celestone [betamethasone] Other (See Comments)     Syncope (causes spike in blood glucose with previous bout of syncope)    Decadron-la      Past Medical History:   Diagnosis Date    Arthritis of both knees 2023    Cystitis 2021    Diabetes mellitus     Gastroesophageal reflux disease 2023    Type 2 diabetes mellitus without complication 2021     Past Surgical History:   Procedure Laterality Date     SECTION      CHOLECYSTECTOMY      CHOLECYSTECTOMY       Family History   Problem Relation Age of Onset    Diabetes Mother     No Known Problems Father      Social History     Tobacco Use    Smoking status: Former     Types: Cigarettes     Quit date: 6/10/2014     Years since quittin.8    Smokeless tobacco: Never   Substance Use Topics    Alcohol use: No    Drug  use: No     Review of Systems   Constitutional:  Positive for fatigue.   Cardiovascular:  Positive for chest pain.     Physical Exam     Initial Vitals [04/06/23 1414]   BP Pulse Resp Temp SpO2   135/76 94 18 98 °F (36.7 °C) 96 %      MAP       --         Physical Exam    Nursing note and vitals reviewed.  Constitutional: She appears well-developed. She is not diaphoretic. No distress.   HENT:   Head: Normocephalic and atraumatic.   Eyes: EOM are normal. Pupils are equal, round, and reactive to light.   Neck:   Normal range of motion.  Cardiovascular:  Normal rate.           Pulmonary/Chest: Breath sounds normal. No respiratory distress. She has no wheezes. She exhibits tenderness (left anterior).   Abdominal: Abdomen is soft. She exhibits no distension. There is no abdominal tenderness.   Musculoskeletal:         General: Normal range of motion.      Cervical back: Normal range of motion.     Neurological: She is alert and oriented to person, place, and time. She has normal strength.   Skin: Skin is warm and dry.   Psychiatric: She has a normal mood and affect.       ED Course   Procedures  Labs Reviewed   CBC W/ AUTO DIFFERENTIAL - Abnormal; Notable for the following components:       Result Value    MPV 8.8 (*)     All other components within normal limits   COMPREHENSIVE METABOLIC PANEL - Abnormal; Notable for the following components:    CO2 22 (*)     Glucose 119 (*)     Alkaline Phosphatase 137 (*)     eGFR 58 (*)     All other components within normal limits   URINALYSIS, REFLEX TO URINE CULTURE - Abnormal; Notable for the following components:    Leukocytes, UA 3+ (*)     All other components within normal limits    Narrative:     Specimen Source->Urine   URINALYSIS MICROSCOPIC - Abnormal; Notable for the following components:    Hyaline Casts, UA 4 (*)     All other components within normal limits    Narrative:     Specimen Source->Urine   TROPONIN I   B-TYPE NATRIURETIC PEPTIDE   MAGNESIUM   MAGNESIUM      EKG Readings: (Independently Interpreted)   Initial Reading: No STEMI. Previous EKG: Compared with most recent EKG Rhythm: Normal Sinus Rhythm. Heart Rate: 92. Ectopy: No Ectopy. Conduction: Normal. Axis: Normal. Clinical Impression: Normal Sinus Rhythm     Imaging Results              X-Ray Chest PA And Lateral (Final result)  Result time 04/06/23 15:12:09      Final result by Naresh Pool MD (04/06/23 15:12:09)                   Impression:      No acute radiographic abnormality.      Electronically signed by: Naresh Pool  Date:    04/06/2023  Time:    15:12               Narrative:    EXAMINATION:  XR CHEST PA AND LATERAL    CLINICAL HISTORY:  Chest Pain;    TECHNIQUE:  PA and lateral views of the chest were performed.    COMPARISON:  02/05/2023    FINDINGS:  The lungs are clear, with normal appearance of pulmonary vasculature and no pleural effusion or pneumothorax.    The cardiac silhouette is normal in size. The hilar and mediastinal contours are unremarkable.    Bones are intact. Degenerative changes of the lumbar spine.  Aortic atherosclerosis.                                       Medications   LIDOcaine 5 % patch 1 patch (1 patch Transdermal Patch Applied 4/6/23 1536)   acetaminophen tablet 1,000 mg (1,000 mg Oral Given 4/6/23 1534)   ketorolac injection 15 mg (15 mg Intravenous Given 4/6/23 1533)     Medical Decision Making:   History:   Old Records Summarized: other records.       <> Summary of Records: Admitted in march 2023 for chest pain, unremarkable workup.   Differential Diagnosis:   PE, ptx, pna, anemia, MI  Clinical Tests:   Lab Tests: Ordered and Reviewed  Radiological Study: Ordered and Reviewed  Medical Tests: Ordered and Reviewed  ED Management:  76 yo F generalized fatigue intermittent episodes of left-sided chest pain.  On arrival she was afebrile, stable vital signs.  Reproducible pain on exam.  No evidence of volume overload or shock on exam. EKG without acute ischemic changes.  Low suspicion for acute PE, pneumothorax, thoracic aortic dissection, cardiac effusion / tamponade. CXR w/o infiltrate. Troponin wnl. No electrolyte abnormalities. HEART score low risk. Pt discharged with strict return precautions and outpatient f/u. Chart review shows the patient had a 1 day admission for left-sided chest pain 1 month ago and was discharged after an unremarkable workup.  Patient also has a recent negative stress test. Referral made for pt to establish primary care here.     No acute emergent medical condition has been identified. The patient appears to be low risk for an emergent medical condition is appropriate for discharge with outpatient f/u as detailed in discharge instructions for reevaluation and possible continued outpatient workup and management. I have discussed the workup with the patient, who has verbalized understanding of the plan and need for outpatient follow-up.  This evaluation does not preclude the development of an emergent condition so in addition, I have advised the patient that they can return to the ED at any time with worsening or change of their symptoms, or with any other medical complaint.              ED Course as of 04/06/23 1636   Thu Apr 06, 2023   1504 3/6/23 TTE: · The left ventricle is normal in size with concentric remodeling and normal systolic function.  · The estimated ejection fraction is 65%.  · Normal left ventricular diastolic function.  · With normal right ventricular systolic function.  · Mild mitral regurgitation.   [AT]   1513 Hemoglobin: 13.8  Normal  [AT]   1631 Creatinine: 1.0  Normal  [AT]   1631 BNP: <10  Normal  [AT]   1632 Troponin I: <0.006  Normal  [AT]      ED Course User Index  [AT] Anju Ellis MD                 Clinical Impression:   Final diagnoses:  [R07.9] Chest pain  [R53.83] Fatigue, unspecified type (Primary)        ED Disposition Condition    Discharge Stable          ED Prescriptions       Medication Sig Dispense Start  Date End Date Auth. Provider    LIDOcaine (LIDODERM) 5 % Place 1 patch onto the skin once daily. Remove & Discard patch within 12 hours or as directed by MD for 15 days 15 patch 4/6/2023 4/21/2023 Anju Ellis MD          Follow-up Information       Follow up With Specialties Details Why Contact Info Additional Information    [facility] Emergency Dept Emergency Medicine  As needed, If symptoms worsen [address]  [facility] [address] [phone]     [facility] Internal Medicine Internal Medicine Schedule an appointment as soon as possible for a visit in 3 days  [address]  [facility] [address] [phone] Please park in [address] or [address] and use [address]. Take Medical Office Bldg elevators.             Anju Ellis MD  04/06/23 2258

## 2023-04-06 NOTE — DISCHARGE INSTRUCTIONS

## 2023-04-06 NOTE — TELEPHONE ENCOUNTER
----- Message from Lennie Fisher sent at 4/5/2023  3:28 PM CDT -----  Regarding: patient returning call  Type:  Patient Returning Call    Who Called: DEANNA GUERRERO [[id number]]    Who Left Message for Patient: NURSE MATTIE    Does the patient know what this is regarding? YES    Best Call Back Number: [phone]     Additional Information: PATIENT IS FEELING SICK AND REQUESTING A CALL BACK. PLEASE ADVISE!

## 2023-04-06 NOTE — TELEPHONE ENCOUNTER
"I called and spoke with Ms Carias. She states that she " doesn't feel well, she has chest pain and shortness of breath" I referred her to go to the ED immediately. She said she will have to find a ride , I then informed her that if she cant  find a ride, she needs to go by ambulance. She understands and agrees.   "

## 2023-04-06 NOTE — FIRST PROVIDER EVALUATION
Emergency Department TeleTriage Encounter Note      CHIEF COMPLAINT    Chief Complaint   Patient presents with    Weakness     Pt c/o increased fatigue and weakness for a few days. Pt has also had intermittent left sided chest pain with shortness of breath with exertion. Chest pain does not radiate. Pt also would like to get a new pcp closer to her home in [place].        VITAL SIGNS   Initial Vitals [04/06/23 1414]   BP Pulse Resp Temp SpO2   135/76 94 18 98 °F (36.7 °C) 96 %      MAP       --            ALLERGIES    Review of patient's allergies indicates:   Allergen Reactions    Decadron [dexamethasone] Other (See Comments)     Syncope (causes spike in blood glucose with previous bout of syncope)    Celestone [betamethasone sodium phosphate]     Celestone [betamethasone] Other (See Comments)     Syncope (causes spike in blood glucose with previous bout of syncope)    Decadron-la        PROVIDER TRIAGE NOTE  76 y/o female presenting with nausea, generalized weakness, intermittent chest pain left side.  Patient reports SOB with exertion, no current chest pain.  No fever or cough.    No medications taken today.      Will order chest pain workup pending ED provider evaluation.      Initial orders will be placed and care will be transferred to an alternate provider when patient is roomed for a full evaluation. Any additional orders and the final disposition will be determined by that provider.         ORDERS  Labs Reviewed - No data to display    ED Orders (720h ago, onward)      None              Virtual Visit Note: The provider triage portion of this emergency department evaluation and documentation was performed via [employer], a HIPAA-compliant telemedicine application, in concert with a tele-presenter in the room. A face to face patient evaluation with one of my colleagues will occur once the patient is placed in an emergency department room.      DISCLAIMER: This note was prepared with M*Modal voice  recognition transcription software. Garbled syntax, mangled pronouns, and other bizarre constructions may be attributed to that software system.

## 2023-04-06 NOTE — TELEPHONE ENCOUNTER
"  This is a duplicate message. I copy and pasted my phone notes from previous message.     I called and spoke with Ms Carias. She states that she " doesn't feel well, she has chest pain and shortness of breath" I referred her to go to the ED immediately. She said she will have to find a ride , I then informed her that if she cant  find a ride, she needs to go by ambulance. She understands and agrees.   "

## 2023-04-28 ENCOUNTER — PATIENT MESSAGE (OUTPATIENT)
Dept: PRIMARY CARE CLINIC | Facility: CLINIC | Age: 78
End: 2023-04-28
Payer: MEDICARE

## 2023-05-01 ENCOUNTER — NURSE TRIAGE (OUTPATIENT)
Dept: ADMINISTRATIVE | Facility: CLINIC | Age: 78
End: 2023-05-01
Payer: MEDICARE

## 2023-05-01 NOTE — TELEPHONE ENCOUNTER
Pt calling asking where her appt is for tomorrow as it is at a new clinic. Provided address and area it is in. verbalized understanding. Denies any further questions or concerns at this time, advised to call back if they have any that come up. Advised pt to call back with any other concerns or worsening symptoms. Verbalized understanding and will route message to provider.     Reason for Disposition   General information question, no triage required and triager able to answer question    Protocols used: Information Only Call - No Triage-A-

## 2023-05-06 ENCOUNTER — PATIENT MESSAGE (OUTPATIENT)
Dept: ADMINISTRATIVE | Facility: HOSPITAL | Age: 78
End: 2023-05-06
Payer: MEDICARE

## 2023-05-09 ENCOUNTER — PATIENT MESSAGE (OUTPATIENT)
Dept: RESEARCH | Facility: HOSPITAL | Age: 78
End: 2023-05-09
Payer: MEDICARE

## 2023-05-16 ENCOUNTER — PATIENT MESSAGE (OUTPATIENT)
Dept: RESEARCH | Facility: HOSPITAL | Age: 78
End: 2023-05-16
Payer: MEDICARE

## 2023-06-07 RX ORDER — CLOBETASOL PROPIONATE 0.5 MG/G
OINTMENT TOPICAL 2 TIMES DAILY
Qty: 60 G | Refills: 5 | Status: SHIPPED | OUTPATIENT
Start: 2023-06-07 | End: 2023-09-01 | Stop reason: SDUPTHER

## 2023-06-07 RX ORDER — CLOBETASOL PROPIONATE 0.5 MG/G
CREAM TOPICAL 2 TIMES DAILY
COMMUNITY
Start: 2023-05-31

## 2023-06-07 NOTE — TELEPHONE ENCOUNTER
Called pt and she was requesting a refill on her ointment for eczema.     Pt states she has Clobetasol PROPIONATE 0.05% cream and does not work effectively and does not recll previous med that was prescribed.    Clobetasol TEMOVATE 0.05% ointment pended.    Please advise that this is not an allergy/contraindication

## 2023-06-07 NOTE — TELEPHONE ENCOUNTER
----- Message from Sherly Carvalho sent at 6/6/2023  4:40 PM CDT -----  Who Called: pt     Refill or New Rx.: new     Clobetasol      Preferred Pharmacy with phone number:   [facility]/pharmacy #[id number] - [place], [place] - [address]  [address] [address]  Phone: [phone] Fax: [phone]          Local or Mail Order: local    Ordering Provider: kathryn Wilson Call Back Number:[phone]     Additional Information: pt want another medication than the one listed above, there is a cream that she had but she doesn't know the  name. Give pt call for a better clarification. Pls advise

## 2023-06-14 ENCOUNTER — TELEPHONE (OUTPATIENT)
Dept: INTERNAL MEDICINE | Facility: CLINIC | Age: 78
End: 2023-06-14
Payer: MEDICARE

## 2023-06-14 DIAGNOSIS — L30.9 ECZEMA, UNSPECIFIED TYPE: Primary | ICD-10-CM

## 2023-06-14 NOTE — TELEPHONE ENCOUNTER
Called pt to see if she currently sees a Dermatologist for eczema.   Pt states she seen a dermatologist 2 years ago.    Pt would like a new ref to see someone at [facility] in [facility].    Derm ref pended

## 2023-06-14 NOTE — TELEPHONE ENCOUNTER
----- Message from Jhonny Rivas MD sent at 6/13/2023 10:14 AM CDT -----  Has patient has seen Dermatology?  It may be time for her to go to see them.    ----- Message -----  From: Juani Ruelas MA  Sent: 6/12/2023  12:22 PM CDT  To: Jhonny Rivas MD    clobetasol 0.05% (TEMOVATE) 0.05 % Oint was called in for pt on 6/7/23.  Pt is requesting a stronger cream/ointment due to Clobetasol being ineffective.    Please advise.    ----- Message -----  From: Aurelia Abhilash  Sent: 6/10/2023   1:33 PM CDT  To: Evelyn Barry Staff    Type: General Call Back     Name of Caller:DEANNA GUERRERO [[id number]]  Symptoms:medication   Would the patient rather a call back or a response via [facility]? Call back   Best Call Back Number:[phone]  Additional Information: Patient indicates she was prescribed a medication by the provider and was told if it didn't help to reach out. Patient indicates she needs a stronger medication as she is still very itchy. Patient indicates she has been on the medication for a week and it is not helping. Patient indicates she needs the provider to send in a stronger medication as soon as possible. Patient indicates she would like to speak with the nurse in the providers office as soon as possible. Please call patient back with further assistance and more information.

## 2023-06-15 PROBLEM — L30.9 ECZEMA: Status: ACTIVE | Noted: 2023-06-15

## 2023-08-26 DIAGNOSIS — R73.03 PREDIABETES: ICD-10-CM

## 2023-08-28 RX ORDER — METFORMIN HYDROCHLORIDE 500 MG/1
500 TABLET ORAL 2 TIMES DAILY WITH MEALS
Qty: 180 TABLET | Refills: 1 | Status: SHIPPED | OUTPATIENT
Start: 2023-08-28

## 2023-08-29 RX ORDER — FUROSEMIDE 20 MG/1
20 TABLET ORAL DAILY PRN
Qty: 90 TABLET | Refills: 1 | Status: SHIPPED | OUTPATIENT
Start: 2023-08-29

## 2023-09-01 ENCOUNTER — OFFICE VISIT (OUTPATIENT)
Dept: INTERNAL MEDICINE | Facility: CLINIC | Age: 78
End: 2023-09-01
Payer: MEDICARE

## 2023-09-01 ENCOUNTER — LAB VISIT (OUTPATIENT)
Dept: LAB | Facility: HOSPITAL | Age: 78
End: 2023-09-01
Attending: FAMILY MEDICINE
Payer: MEDICARE

## 2023-09-01 VITALS
SYSTOLIC BLOOD PRESSURE: 114 MMHG | DIASTOLIC BLOOD PRESSURE: 64 MMHG | TEMPERATURE: 99 F | HEART RATE: 70 BPM | BODY MASS INDEX: 31.2 KG/M2 | WEIGHT: 182.75 LBS | HEIGHT: 64 IN

## 2023-09-01 DIAGNOSIS — E11.9 TYPE 2 DIABETES MELLITUS WITHOUT COMPLICATION, WITHOUT LONG-TERM CURRENT USE OF INSULIN: ICD-10-CM

## 2023-09-01 DIAGNOSIS — K21.9 GASTROESOPHAGEAL REFLUX DISEASE, UNSPECIFIED WHETHER ESOPHAGITIS PRESENT: ICD-10-CM

## 2023-09-01 DIAGNOSIS — R60.0 BILATERAL EDEMA OF LOWER EXTREMITY: ICD-10-CM

## 2023-09-01 DIAGNOSIS — L30.9 ECZEMA, UNSPECIFIED TYPE: ICD-10-CM

## 2023-09-01 DIAGNOSIS — R25.2 LEG CRAMPS: ICD-10-CM

## 2023-09-01 DIAGNOSIS — E11.9 TYPE 2 DIABETES MELLITUS WITHOUT COMPLICATION, WITHOUT LONG-TERM CURRENT USE OF INSULIN: Primary | ICD-10-CM

## 2023-09-01 LAB
ALBUMIN SERPL BCP-MCNC: 3.7 G/DL (ref 3.5–5.2)
ALP SERPL-CCNC: 139 U/L (ref 55–135)
ALT SERPL W/O P-5'-P-CCNC: 16 U/L (ref 10–44)
ANION GAP SERPL CALC-SCNC: 10 MMOL/L (ref 8–16)
AST SERPL-CCNC: 15 U/L (ref 10–40)
BILIRUB SERPL-MCNC: 0.2 MG/DL (ref 0.1–1)
BUN SERPL-MCNC: 10 MG/DL (ref 8–23)
CALCIUM SERPL-MCNC: 9 MG/DL (ref 8.7–10.5)
CHLORIDE SERPL-SCNC: 109 MMOL/L (ref 95–110)
CHOLEST SERPL-MCNC: 141 MG/DL (ref 120–199)
CHOLEST/HDLC SERPL: 3.4 {RATIO} (ref 2–5)
CO2 SERPL-SCNC: 25 MMOL/L (ref 23–29)
CREAT SERPL-MCNC: 1.2 MG/DL (ref 0.5–1.4)
EST. GFR  (NO RACE VARIABLE): 46.3 ML/MIN/1.73 M^2
ESTIMATED AVG GLUCOSE: 134 MG/DL (ref 68–131)
GLUCOSE SERPL-MCNC: 103 MG/DL (ref 70–110)
HBA1C MFR BLD: 6.3 % (ref 4–5.6)
HDLC SERPL-MCNC: 42 MG/DL (ref 40–75)
HDLC SERPL: 29.8 % (ref 20–50)
LDLC SERPL CALC-MCNC: 74 MG/DL (ref 63–159)
MAGNESIUM SERPL-MCNC: 2 MG/DL (ref 1.6–2.6)
NONHDLC SERPL-MCNC: 99 MG/DL
POTASSIUM SERPL-SCNC: 3.6 MMOL/L (ref 3.5–5.1)
PROT SERPL-MCNC: 7.1 G/DL (ref 6–8.4)
SODIUM SERPL-SCNC: 144 MMOL/L (ref 136–145)
TRIGL SERPL-MCNC: 125 MG/DL (ref 30–150)

## 2023-09-01 PROCEDURE — 36415 COLL VENOUS BLD VENIPUNCTURE: CPT | Mod: PO | Performed by: FAMILY MEDICINE

## 2023-09-01 PROCEDURE — 3074F SYST BP LT 130 MM HG: CPT | Mod: CPTII,S$GLB,, | Performed by: FAMILY MEDICINE

## 2023-09-01 PROCEDURE — 83036 HEMOGLOBIN GLYCOSYLATED A1C: CPT | Performed by: FAMILY MEDICINE

## 2023-09-01 PROCEDURE — 1126F PR PAIN SEVERITY QUANTIFIED, NO PAIN PRESENT: ICD-10-PCS | Mod: CPTII,S$GLB,, | Performed by: FAMILY MEDICINE

## 2023-09-01 PROCEDURE — 3072F PR LOW RISK FOR RETINOPATHY: ICD-10-PCS | Mod: CPTII,S$GLB,, | Performed by: FAMILY MEDICINE

## 2023-09-01 PROCEDURE — 3288F FALL RISK ASSESSMENT DOCD: CPT | Mod: CPTII,S$GLB,, | Performed by: FAMILY MEDICINE

## 2023-09-01 PROCEDURE — 3288F PR FALLS RISK ASSESSMENT DOCUMENTED: ICD-10-PCS | Mod: CPTII,S$GLB,, | Performed by: FAMILY MEDICINE

## 2023-09-01 PROCEDURE — 1159F PR MEDICATION LIST DOCUMENTED IN MEDICAL RECORD: ICD-10-PCS | Mod: CPTII,S$GLB,, | Performed by: FAMILY MEDICINE

## 2023-09-01 PROCEDURE — 99214 PR OFFICE/OUTPT VISIT, EST, LEVL IV, 30-39 MIN: ICD-10-PCS | Mod: S$GLB,,, | Performed by: FAMILY MEDICINE

## 2023-09-01 PROCEDURE — 80061 LIPID PANEL: CPT | Performed by: FAMILY MEDICINE

## 2023-09-01 PROCEDURE — 3078F DIAST BP <80 MM HG: CPT | Mod: CPTII,S$GLB,, | Performed by: FAMILY MEDICINE

## 2023-09-01 PROCEDURE — 1101F PT FALLS ASSESS-DOCD LE1/YR: CPT | Mod: CPTII,S$GLB,, | Performed by: FAMILY MEDICINE

## 2023-09-01 PROCEDURE — 83735 ASSAY OF MAGNESIUM: CPT | Performed by: FAMILY MEDICINE

## 2023-09-01 PROCEDURE — 3074F PR MOST RECENT SYSTOLIC BLOOD PRESSURE < 130 MM HG: ICD-10-PCS | Mod: CPTII,S$GLB,, | Performed by: FAMILY MEDICINE

## 2023-09-01 PROCEDURE — 99999 PR PBB SHADOW E&M-EST. PATIENT-LVL III: ICD-10-PCS | Mod: PBBFAC,,, | Performed by: FAMILY MEDICINE

## 2023-09-01 PROCEDURE — 80053 COMPREHEN METABOLIC PANEL: CPT | Performed by: FAMILY MEDICINE

## 2023-09-01 PROCEDURE — 3072F LOW RISK FOR RETINOPATHY: CPT | Mod: CPTII,S$GLB,, | Performed by: FAMILY MEDICINE

## 2023-09-01 PROCEDURE — 1126F AMNT PAIN NOTED NONE PRSNT: CPT | Mod: CPTII,S$GLB,, | Performed by: FAMILY MEDICINE

## 2023-09-01 PROCEDURE — 99999 PR PBB SHADOW E&M-EST. PATIENT-LVL III: CPT | Mod: PBBFAC,,, | Performed by: FAMILY MEDICINE

## 2023-09-01 PROCEDURE — 99214 OFFICE O/P EST MOD 30 MIN: CPT | Mod: S$GLB,,, | Performed by: FAMILY MEDICINE

## 2023-09-01 PROCEDURE — 3078F PR MOST RECENT DIASTOLIC BLOOD PRESSURE < 80 MM HG: ICD-10-PCS | Mod: CPTII,S$GLB,, | Performed by: FAMILY MEDICINE

## 2023-09-01 PROCEDURE — 1159F MED LIST DOCD IN RCRD: CPT | Mod: CPTII,S$GLB,, | Performed by: FAMILY MEDICINE

## 2023-09-01 PROCEDURE — 1101F PR PT FALLS ASSESS DOC 0-1 FALLS W/OUT INJ PAST YR: ICD-10-PCS | Mod: CPTII,S$GLB,, | Performed by: FAMILY MEDICINE

## 2023-09-01 RX ORDER — CLOBETASOL PROPIONATE 0.5 MG/G
OINTMENT TOPICAL 2 TIMES DAILY
Qty: 60 G | Refills: 5 | Status: SHIPPED | OUTPATIENT
Start: 2023-09-01

## 2023-09-01 NOTE — PROGRESS NOTES
Subjective:       Patient ID: Aretha Holt is a 78 y.o. female.    Chief Complaint: Leg Pain    Diabetes  She presents for her follow-up diabetic visit. She has type 2 diabetes mellitus. Her disease course has been stable. Pertinent negatives for hypoglycemia include no confusion, dizziness or headaches. Pertinent negatives for diabetes include no blurred vision, no chest pain and no fatigue. Pertinent negatives for hypoglycemia complications include no blackouts and no hospitalization. Symptoms are stable.     Review of Systems   Constitutional:  Negative for fatigue.   Eyes:  Negative for blurred vision.   Respiratory:  Negative for shortness of breath.    Cardiovascular:  Positive for leg swelling. Negative for chest pain.   Gastrointestinal:  Negative for abdominal pain.   Neurological:  Negative for dizziness and headaches.   Psychiatric/Behavioral:  Negative for confusion.        Objective:      Physical Exam  Vitals and nursing note reviewed.   Constitutional:       General: She is not in acute distress.     Appearance: Normal appearance. She is well-developed. She is not diaphoretic.   HENT:      Head: Normocephalic and atraumatic.   Cardiovascular:      Heart sounds: Murmur heard.   Pulmonary:      Effort: Pulmonary effort is normal. No respiratory distress.      Breath sounds: Normal breath sounds. No wheezing.   Skin:     General: Skin is warm and dry.      Findings: No erythema or rash.   Neurological:      Mental Status: She is alert.         Assessment:       1. Type 2 diabetes mellitus without complication, without long-term current use of insulin    2. Eczema, unspecified type    3. Leg cramps    4. Gastroesophageal reflux disease, unspecified whether esophagitis present    5. Bilateral edema of lower extremity        Plan:     Problem List Items Addressed This Visit          Derm    Eczema    Relevant Medications    clobetasol 0.05% (TEMOVATE) 0.05 % Oint       Endocrine    Type 2 diabetes  mellitus without complication, without long-term current use of insulin - Primary    Overview     Chronic, Stable, cont metformin         Relevant Orders    Hemoglobin A1C    Lipid Panel    Microalbumin/Creatinine Ratio, Urine    Comprehensive Metabolic Panel    Magnesium       GI    Gastroesophageal reflux disease    Overview     Chronic, Stable, cont protonix            Other    Bilateral edema of lower extremity    Overview     Chronic, Stable, cont lasix, wear compression stockings         Leg cramps    Current Assessment & Plan     Drink Tonic Water         Relevant Orders    Magnesium

## 2023-09-25 ENCOUNTER — OFFICE VISIT (OUTPATIENT)
Dept: FAMILY MEDICINE | Facility: HOSPITAL | Age: 78
End: 2023-09-25
Payer: MEDICARE

## 2023-09-25 VITALS
BODY MASS INDEX: 30.68 KG/M2 | OXYGEN SATURATION: 100 % | SYSTOLIC BLOOD PRESSURE: 122 MMHG | HEART RATE: 93 BPM | DIASTOLIC BLOOD PRESSURE: 74 MMHG | WEIGHT: 179.69 LBS | HEIGHT: 64 IN

## 2023-09-25 DIAGNOSIS — R53.83 FATIGUE, UNSPECIFIED TYPE: Primary | ICD-10-CM

## 2023-09-25 DIAGNOSIS — R53.81 PHYSICAL DECONDITIONING: ICD-10-CM

## 2023-09-25 DIAGNOSIS — R60.0 BILATERAL LOWER EXTREMITY EDEMA: ICD-10-CM

## 2023-09-25 DIAGNOSIS — K21.9 GASTROESOPHAGEAL REFLUX DISEASE, UNSPECIFIED WHETHER ESOPHAGITIS PRESENT: ICD-10-CM

## 2023-09-25 PROCEDURE — 99214 OFFICE O/P EST MOD 30 MIN: CPT

## 2023-09-25 RX ORDER — PANTOPRAZOLE SODIUM 40 MG/1
40 TABLET, DELAYED RELEASE ORAL DAILY
Qty: 30 TABLET | Refills: 5 | Status: SHIPPED | OUTPATIENT
Start: 2023-09-25 | End: 2024-03-26 | Stop reason: SDUPTHER

## 2023-09-25 NOTE — PROGRESS NOTES
History & Physical  [facility]      SUBJECTIVE:     History of Present Illness:  Patient is a 78 y.o. female with T2DM and mild MR who presents to clinic to establish care. Was previously seeing PCP in [place] Dr. Rivas but states its far since she lives in [place] and prefers to come here for her care.     C/o SOB and generalized fatigue, nausea x 1 day. Denies any fever/chills, palpitations, vision changes, dizziness/lightheadedness. Denies any recent falls. Denies any sick contacts. States she lives alone. Has compression socks and states she elevates her legs. Feels like her swelling in her legs is worse. Takes Lasix 20 mg daily for it.   Has ECHO 03/2023 and DVT US which were unremarkable. Atypical chest pain lasting a few seconds - stress test 08/2022 was negative for ischemia. Sees Cardiology 10/4 but patient states she cannot make it to that location and would to see Cardiology here in [place] since it will be easier for her.   Has hx of heart burn - but states she has not been taking it, unclear why  GAD7-11; PHQ9- 3    T2DM - Last A1c 6.3. Takes Metformin      Last pap smear- denies any hx of abnormal pap smears  Last mamogram- does not know when her last one was  Immunizations UTD  Last colonoscopy- states had one many years ago, but unsure when  Last HgbA1C- 6.3 9/1/23  Last STI testing- unsure  Last lipid panel- 9/1/23 - LDL74  DEXA Scan- 8/8/22 Osteopenia, not taking Vit D or Calcium    Smoker- 1-2 cigs per day in 1980s. Previous smoker.  EtOH use- denies any  Drug use- denies any   Diet- eats beans, fruits, salad - mostly home cooked meals. Mostly water  Exercise- not currently doing anything  Sleep- no issues  Stress- states her twin sister has not been talking to her and no family visits her  Social history - retired from [employer] for 25 years; does arts and crafts; lives alone but has children and grandchildren    Review of patient's allergies indicates:   Allergen  "Reactions    Decadron [dexamethasone] Other (See Comments)     Syncope (causes spike in blood glucose with previous bout of syncope)    Celestone [betamethasone sodium phosphate]     Celestone [betamethasone] Other (See Comments)     Syncope (causes spike in blood glucose with previous bout of syncope)    Decadron-la        Past Medical History:   Diagnosis Date    Arthritis of both knees 2023    Cystitis 2021    Diabetes mellitus     Gastroesophageal reflux disease 2023    Type 2 diabetes mellitus without complication 2021     Past Surgical History:   Procedure Laterality Date     SECTION      CHOLECYSTECTOMY      CHOLECYSTECTOMY       Family History   Problem Relation Age of Onset    Diabetes Mother     No Known Problems Father      Social History     Tobacco Use    Smoking status: Former     Current packs/day: 0.00     Types: Cigarettes     Quit date: 6/10/2014     Years since quittin.2    Smokeless tobacco: Never   Substance Use Topics    Alcohol use: No    Drug use: No        OBJECTIVE:     Vital Signs (Most Recent)  Vitals:    23 1314   BP: 122/74   Pulse: 93   SpO2: 100%   Weight: 81.5 kg (179 lb 10.8 oz)   Height: 5' 4" (1.626 m)     Body mass index is 30.84 kg/m².      Physical Exam  Vitals reviewed.   Constitutional:       General: She is not in acute distress.     Appearance: She is not toxic-appearing.   Eyes:      Extraocular Movements: Extraocular movements intact.   Cardiovascular:      Rate and Rhythm: Normal rate and regular rhythm.      Heart sounds: Normal heart sounds. No murmur heard.  Pulmonary:      Effort: Pulmonary effort is normal. No respiratory distress.      Breath sounds: Normal breath sounds. No wheezing, rhonchi or rales.   Abdominal:      General: Abdomen is flat. There is no distension.      Palpations: Abdomen is soft.      Tenderness: There is no abdominal tenderness. There is no guarding or rebound.   Musculoskeletal:         General: Normal " range of motion.      Right lower leg: Edema present.      Left lower leg: Edema present.   Skin:     General: Skin is warm.      Capillary Refill: Capillary refill takes 2 to 3 seconds.   Neurological:      Mental Status: She is alert and oriented to person, place, and time.   Psychiatric:         Mood and Affect: Mood normal.         Behavior: Behavior normal.         Thought Content: Thought content normal.         ASSESSMENT/PLAN:   78 y.o.female with T2DM who presents to clinic to establish care. Here with c/o generalized fatigue and SOB. Lung and cardiac physical exam normal. Does have BLE which she takes Lasix for, but feels like it is getting worse. Atypical chest pain lasting a few seconds, likely GERD as she is off her protonix - unlikely cardiac given her past records of normal ECHO and stress test within the past year. EKGs have been normal as well. Has also had labs earlier this month which were unremarkable.     Gastroesophageal reflux disease  - Re-start pantoprazole (PROTONIX) 40 MG to see if that helps with her atypical chest pain    Fatigue  Physical deconditioning  - Ambulatory referral/consult to Physical/Occupational Therapy for aquatic therapy  -Counseled patient on importance of exercising at least 150-300 minutes per week (i.e. at least 30 minutes, 3 days a week) of moderate physical activity. Encouraged slowly increasing exercise as toleratee    Bilateral lower extremity edema  Atypical chest pain  - Ambulatory referral/consult to Cardiology to transition to [place] as patient can no longer go to [place]  - Continue PO Lasix    Follow-up: 1 month    A total of 40 minutes was spent on patient care during this encounter which included chart review, examining the patient, formulating a treatment plan and documentation.     Medical decision making straight forward and not complex during this visit.     Case discussed with Dr. Sundeep Dooley MD  [facility],  PGY-2  09/25/2023

## 2023-10-05 ENCOUNTER — HOSPITAL ENCOUNTER (EMERGENCY)
Facility: HOSPITAL | Age: 78
Discharge: HOME | End: 2023-10-05
Attending: EMERGENCY MEDICINE
Payer: MEDICARE

## 2023-10-05 ENCOUNTER — NURSE TRIAGE (OUTPATIENT)
Dept: ADMINISTRATIVE | Facility: CLINIC | Age: 78
End: 2023-10-05
Payer: MEDICARE

## 2023-10-05 VITALS
RESPIRATION RATE: 23 BRPM | DIASTOLIC BLOOD PRESSURE: 58 MMHG | SYSTOLIC BLOOD PRESSURE: 127 MMHG | HEART RATE: 71 BPM | TEMPERATURE: 98 F | OXYGEN SATURATION: 99 %

## 2023-10-05 DIAGNOSIS — F32.A DEPRESSION, UNSPECIFIED DEPRESSION TYPE: ICD-10-CM

## 2023-10-05 DIAGNOSIS — R53.1 WEAKNESS: Primary | ICD-10-CM

## 2023-10-05 LAB
ALBUMIN SERPL BCP-MCNC: 3.8 G/DL (ref 3.5–5.2)
ALP SERPL-CCNC: 118 U/L (ref 55–135)
ALT SERPL W/O P-5'-P-CCNC: 11 U/L (ref 10–44)
ANION GAP SERPL CALC-SCNC: 10 MMOL/L (ref 8–16)
AST SERPL-CCNC: 16 U/L (ref 10–40)
BACTERIA #/AREA URNS HPF: NORMAL /HPF
BASOPHILS # BLD AUTO: 0.02 K/UL (ref 0–0.2)
BASOPHILS NFR BLD: 0.3 % (ref 0–1.9)
BILIRUB SERPL-MCNC: 0.3 MG/DL (ref 0.1–1)
BILIRUB UR QL STRIP: NEGATIVE
BNP SERPL-MCNC: 16 PG/ML (ref 0–99)
BUN SERPL-MCNC: 14 MG/DL (ref 8–23)
CALCIUM SERPL-MCNC: 9.3 MG/DL (ref 8.7–10.5)
CHLORIDE SERPL-SCNC: 106 MMOL/L (ref 95–110)
CLARITY UR: CLEAR
CO2 SERPL-SCNC: 25 MMOL/L (ref 23–29)
COLOR UR: YELLOW
CREAT SERPL-MCNC: 1 MG/DL (ref 0.5–1.4)
D DIMER PPP IA.FEU-MCNC: 0.66 MG/L FEU
DIFFERENTIAL METHOD: ABNORMAL
EOSINOPHIL # BLD AUTO: 0.1 K/UL (ref 0–0.5)
EOSINOPHIL NFR BLD: 1.3 % (ref 0–8)
ERYTHROCYTE [DISTWIDTH] IN BLOOD BY AUTOMATED COUNT: 13.5 % (ref 11.5–14.5)
EST. GFR  (NO RACE VARIABLE): 58 ML/MIN/1.73 M^2
GLUCOSE SERPL-MCNC: 86 MG/DL (ref 70–110)
GLUCOSE UR QL STRIP: NEGATIVE
HCT VFR BLD AUTO: 38.7 % (ref 37–48.5)
HGB BLD-MCNC: 12.7 G/DL (ref 12–16)
HGB UR QL STRIP: NEGATIVE
IMM GRANULOCYTES # BLD AUTO: 0.02 K/UL (ref 0–0.04)
IMM GRANULOCYTES NFR BLD AUTO: 0.3 % (ref 0–0.5)
KETONES UR QL STRIP: NEGATIVE
LEUKOCYTE ESTERASE UR QL STRIP: ABNORMAL
LYMPHOCYTES # BLD AUTO: 2.8 K/UL (ref 1–4.8)
LYMPHOCYTES NFR BLD: 37.1 % (ref 18–48)
MCH RBC QN AUTO: 30.1 PG (ref 27–31)
MCHC RBC AUTO-ENTMCNC: 32.8 G/DL (ref 32–36)
MCV RBC AUTO: 92 FL (ref 82–98)
MICROSCOPIC COMMENT: NORMAL
MONOCYTES # BLD AUTO: 0.7 K/UL (ref 0.3–1)
MONOCYTES NFR BLD: 9.1 % (ref 4–15)
NEUTROPHILS # BLD AUTO: 3.9 K/UL (ref 1.8–7.7)
NEUTROPHILS NFR BLD: 51.9 % (ref 38–73)
NITRITE UR QL STRIP: NEGATIVE
NRBC BLD-RTO: 0 /100 WBC
PH UR STRIP: 6 [PH] (ref 5–8)
PLATELET # BLD AUTO: 262 K/UL (ref 150–450)
PMV BLD AUTO: 8.6 FL (ref 9.2–12.9)
POTASSIUM SERPL-SCNC: 4.3 MMOL/L (ref 3.5–5.1)
PROT SERPL-MCNC: 7.2 G/DL (ref 6–8.4)
PROT UR QL STRIP: ABNORMAL
RBC # BLD AUTO: 4.22 M/UL (ref 4–5.4)
RBC #/AREA URNS HPF: 0 /HPF (ref 0–4)
SODIUM SERPL-SCNC: 141 MMOL/L (ref 136–145)
SP GR UR STRIP: 1.03 (ref 1–1.03)
SQUAMOUS #/AREA URNS HPF: 2 /HPF
TROPONIN I SERPL DL<=0.01 NG/ML-MCNC: 0.02 NG/ML (ref 0–0.03)
TROPONIN I SERPL DL<=0.01 NG/ML-MCNC: <0.006 NG/ML (ref 0–0.03)
TSH SERPL DL<=0.005 MIU/L-ACNC: 0.69 UIU/ML (ref 0.4–4)
URN SPEC COLLECT METH UR: ABNORMAL
UROBILINOGEN UR STRIP-ACNC: NEGATIVE EU/DL
WBC # BLD AUTO: 7.57 K/UL (ref 3.9–12.7)
WBC #/AREA URNS HPF: 1 /HPF (ref 0–5)

## 2023-10-05 PROCEDURE — 81000 URINALYSIS NONAUTO W/SCOPE: CPT | Performed by: EMERGENCY MEDICINE

## 2023-10-05 PROCEDURE — 93005 ELECTROCARDIOGRAM TRACING: CPT

## 2023-10-05 PROCEDURE — 85379 FIBRIN DEGRADATION QUANT: CPT | Performed by: EMERGENCY MEDICINE

## 2023-10-05 PROCEDURE — 25500020 PHARM REV CODE 255: Performed by: EMERGENCY MEDICINE

## 2023-10-05 PROCEDURE — 84484 ASSAY OF TROPONIN QUANT: CPT | Performed by: EMERGENCY MEDICINE

## 2023-10-05 PROCEDURE — 84443 ASSAY THYROID STIM HORMONE: CPT | Performed by: EMERGENCY MEDICINE

## 2023-10-05 PROCEDURE — 83880 ASSAY OF NATRIURETIC PEPTIDE: CPT | Performed by: EMERGENCY MEDICINE

## 2023-10-05 PROCEDURE — 93010 EKG 12-LEAD: ICD-10-PCS | Mod: ,,, | Performed by: INTERNAL MEDICINE

## 2023-10-05 PROCEDURE — 99285 EMERGENCY DEPT VISIT HI MDM: CPT | Mod: 25

## 2023-10-05 PROCEDURE — 93010 ELECTROCARDIOGRAM REPORT: CPT | Mod: ,,, | Performed by: INTERNAL MEDICINE

## 2023-10-05 PROCEDURE — 80053 COMPREHEN METABOLIC PANEL: CPT | Performed by: EMERGENCY MEDICINE

## 2023-10-05 PROCEDURE — 85025 COMPLETE CBC W/AUTO DIFF WBC: CPT | Performed by: EMERGENCY MEDICINE

## 2023-10-05 RX ADMIN — IOHEXOL 100 ML: 350 INJECTION, SOLUTION INTRAVENOUS at 04:10

## 2023-10-05 NOTE — ED PROVIDER NOTES
Encounter Date: 10/5/2023       History     Chief Complaint   Patient presents with    Fatigue    Shortness of Breath     Pt arrived via [facility] from home with c/o SOB and weakness x's 4 weeks. EMS states pt told them that she could no longer wait on her cardiology appt.      Patient presents to the emergency department chief complaint of generalized weakness and lack of energy for over a month now.  She was given a referral to Cardiology by her primary care provider and is scheduled to see them in the next week or 2.  She states that she is concerned in his worried because she does not know what is wrong with her.  She denies any fevers/chills.  She denies any nausea/vomiting/diarrhea.  She states that she is had intermittent chest pain however this only lasts for a few seconds and is completely resolved after that time.  She does admit to some lower extremity edema, she does take furosemide for this along with wearing compression stockings and elevating her feet at night.          Review of patient's allergies indicates:   Allergen Reactions    Decadron [dexamethasone] Other (See Comments)     Syncope (causes spike in blood glucose with previous bout of syncope)    Celestone [betamethasone sodium phosphate]     Celestone [betamethasone] Other (See Comments)     Syncope (causes spike in blood glucose with previous bout of syncope)    Decadron-la      Past Medical History:   Diagnosis Date    Arthritis of both knees 2023    Cystitis 2021    Diabetes mellitus     Gastroesophageal reflux disease 2023    Type 2 diabetes mellitus without complication 2021     Past Surgical History:   Procedure Laterality Date     SECTION      CHOLECYSTECTOMY      CHOLECYSTECTOMY       Family History   Problem Relation Age of Onset    Diabetes Mother     No Known Problems Father      Social History     Tobacco Use    Smoking status: Former     Current packs/day: 0.00     Types: Cigarettes     Quit date:  6/10/2014     Years since quittin.3    Smokeless tobacco: Never   Substance Use Topics    Alcohol use: No    Drug use: No     Review of Systems   Neurological:  Positive for weakness.       Physical Exam     Initial Vitals [10/05/23 1403]   BP Pulse Resp Temp SpO2   123/62 71 17 97.8 °F (36.6 °C) 97 %      MAP       --         Physical Exam    Vitals reviewed.  Constitutional: She appears well-developed.   Cardiovascular:  Normal rate and regular rhythm.           No murmur heard.  Pulmonary/Chest: Breath sounds normal. She has no wheezes.   Abdominal: Abdomen is soft. There is no abdominal tenderness.   Musculoskeletal:         General: Normal range of motion.     Neurological: She is alert and oriented to person, place, and time.   Skin: Skin is warm and dry. No rash noted.         ED Course   Procedures  Labs Reviewed   CBC W/ AUTO DIFFERENTIAL - Abnormal; Notable for the following components:       Result Value    MPV 8.6 (*)     All other components within normal limits   COMPREHENSIVE METABOLIC PANEL - Abnormal; Notable for the following components:    eGFR 58 (*)     All other components within normal limits   D DIMER, QUANTITATIVE - Abnormal; Notable for the following components:    D-Dimer 0.66 (*)     All other components within normal limits   URINALYSIS, REFLEX TO URINE CULTURE - Abnormal; Notable for the following components:    Protein, UA Trace (*)     Leukocytes, UA 1+ (*)     All other components within normal limits    Narrative:     Specimen Source->Urine   TROPONIN I   B-TYPE NATRIURETIC PEPTIDE   TSH   TROPONIN I   URINALYSIS MICROSCOPIC    Narrative:     Specimen Source->Urine     EKG Readings: (Independently Interpreted)   Sinus rhythm rate of 70, right axis deviation, no ST or T-wave abnormalities interpreted by me     ECG Results              EKG 12-lead (In process)  Result time 10/05/23 16:01:30      In process by Interface, Lab In [facility] (10/05/23 16:01:30)                    Narrative:    Test Reason : R53.1,    Vent. Rate : 070 BPM     Atrial Rate : 070 BPM     P-R Int : 126 ms          QRS Dur : 084 ms      QT Int : 386 ms       P-R-T Axes : 045 064 030 degrees     QTc Int : 416 ms    Normal sinus rhythm  Normal ECG  When compared with ECG of 06-APR-2023 14:19,  No significant change was found    Referred By: System System           Confirmed By:                                   Imaging Results              CTA Chest Non-Coronary (PE Studies) (Final result)  Result time 10/05/23 16:42:36      Final result by Naresh Pool MD (10/05/23 16:42:36)                   Impression:      1. No evidence of pulmonary embolism  2. No acute abnormality  3. Small hiatal hernia.      Electronically signed by: Naresh Pool  Date:    10/05/2023  Time:    16:42               Narrative:    EXAMINATION:  CTA CHEST NON CORONARY (PE STUDIES)    CLINICAL HISTORY:  Pulmonary embolism (PE) suspected, positive D-dimer;    TECHNIQUE:  Low dose axial images, sagittal and coronal reformations were obtained from the thoracic inlet to the lung bases following the IV administration of 100 mL of Omnipaque 350.  Contrast timing was optimized to evaluate the pulmonary arteries.  MIP images were performed.    COMPARISON:  None    FINDINGS:  Pulmonary arteries:Pulmonary arteries are adequately enhanced.No filling defects to indicate pulmonary embolism.    Thoracic soft tissues: Small hiatal hernia.    Aorta: Left-sided aortic arch.  No aneurysm or dissection.    Heart: Normal size. No effusion.    Wilma/Mediastinum: No pathologic ziyad enlargement.    Airways: Patent.    Lungs/Pleura: Clear lungs. No pleural effusion or thickening.    Esophagus: Unremarkable.    Upper Abdomen: No abnormality of the partially imaged upper abdomen.    Bones: No acute fracture. No suspicious lytic or sclerotic lesions.                                       X-Ray Chest AP Portable (Final result)  Result time 10/05/23 15:15:09      Final  result by Mitzy Puri MD (10/05/23 15:15:09)                   Impression:      No acute intrathoracic process seen      Electronically signed by: Mitzy Puri MD  Date:    10/05/2023  Time:    15:15               Narrative:    EXAMINATION:  XR CHEST AP PORTABLE    CLINICAL HISTORY:  weakness;    TECHNIQUE:  Single frontal view of the chest was performed.    COMPARISON:  04/06/2023 chest radiograph    FINDINGS:  The cardiac silhouette is normal in size.  The pulmonary vascularity is normal.  The lungs are clear.  No pleural effusion.  No pneumothorax.    Atherosclerotic plaque of the aorta.    The osseous structures appear normal.                                       Medications   iohexoL (OMNIPAQUE 350) injection 100 mL (100 mLs Intravenous Given 10/5/23 1608)     Medical Decision Making  There is family at bedside on final re-evaluation.  Her workup is unremarkable.  After asking about social situations the pt states that she has had a falling out with her identical twin sister and this has affected her.  She is tearful in the room talking about it.  She denies SI.  I will place a psychiatry referral for pt.  Family is in agreement with this and pt is willing.  Instructed pt to keep her cardiology appt without fail and to return immediately for any new or worsening sxs and both pt and family verbalized understanding.    Amount and/or Complexity of Data Reviewed  Labs: ordered. Decision-making details documented in ED Course.  Radiology: ordered. Decision-making details documented in ED Course.    Risk  Prescription drug management.               ED Course as of 10/07/23 1405   Thu Oct 05, 2023   1523 CBC auto differential(!)  nonspecific [CD]   1523 Comprehensive metabolic panel(!)  nonspecific [CD]   1524 X-Ray Chest AP Portable  No acute process [CD]   1544 TSH  wnl [CD]   1544 Troponin I #1  wnl [CD]   1544 BNP  wnl [CD]   1544 D dimer, quantitative(!)  elevated [CD]   1648 Urinalysis, Reflex  to Urine Culture Urine, Clean Catch(!)  No uti [CD]   1812 CTA Chest Non-Coronary (PE Studies)  No pe [CD]   1831 Troponin I #2  wnl [CD]      ED Course User Index  [CD] Destin Oliver DO                    Clinical Impression:   Final diagnoses:  [R53.1] Weakness (Primary)  [F32.A] Depression, unspecified depression type        ED Disposition Condition    Discharge Stable          ED Prescriptions    None       Follow-up Information       Follow up With Specialties Details Why Contact Info Additional Information    [facility] Family Medicine Schedule an appointment as soon as possible for a visit   [address], [address]  [facility] [address] [phone] Please park in [address] or [address] and use [address] moon. [facility] Medical Office Bldg. elevators.             Destin Oliver DO  10/07/23 3040

## 2023-10-05 NOTE — TELEPHONE ENCOUNTER
C/o generalized weakness, fatigue, & feeling sob, intermittent CP. When in store earlier, having to stop & break, lean on cart to catch breath & not pass out. Since being home, feeling very weak & hard to catch breath. Answers YES to severe difficulty breathing now. YES to feeling like she is struggling for each breath now.     Advised per protocol-hang up & call 911 now. Pt vu & denies needing assistance calling for ambulance.     Reason for Disposition   SEVERE difficulty breathing (e.g., struggling for each breath, speaks in single words, pulse > 120)    Protocols used: Breathing Difficulty-A-[place]

## 2023-10-05 NOTE — ED NOTES
Patient O2 sat 100% on RA. Patient awake and alert, denies SOB at this time but still reports feeling generalized weakness.  Family @ bedside.

## 2023-10-05 NOTE — ED NOTES
Discharge instructions and follow up appointments reviewed with patient. Patient verbalizes understanding.

## 2023-10-05 NOTE — ED NOTES
Dr. Oliver @ [facility]. Patient presents to ED via [facility] from home with c/o ongoing SOB and general malaise x 4 weeks. Patient states she is very active at [employer] and at home and has not felt like herself in the past month. Patient states she has a cardiology appt coming up, but states she feels like she can not wait that long. Patient also reports intermittent leg swelling and off and on chest pain. Patient was 96% on RA with tachypena in the 20s; received 2L O2 via NC with EMS. Patient RR now even and unlabored and O2 sat is 100% on RA.

## 2023-10-15 NOTE — PROGRESS NOTES
"[facility]     SUBJECTIVE:     History of Present Illness:  Patient is a 78 y.o. female presents to establish cardiac care. Was being seen a cardiologist in [facility].     Primary Diagnosis:   Hypertension: none  DM: positive  Smoker: quit over > 10 years  Family history of early CAD  Heart disease : none  ROS  No chest pains  About 2 weeks ago, had shortness of breath and went to [facility] ER. All workup negative. Diagnosed as having anxiety No further shortness of breath; no PND or orthopnea  No syncope  No palpitations  Activity: does house work; does walk all over in the [employer] without issues   Review of patient's allergies indicates:   Allergen Reactions    Decadron [dexamethasone] Other (See Comments)     Syncope (causes spike in blood glucose with previous bout of syncope)    Celestone [betamethasone sodium phosphate]     Celestone [betamethasone] Other (See Comments)     Syncope (causes spike in blood glucose with previous bout of syncope)    Decadron-la        Past Medical History:   Diagnosis Date    Arthritis of both knees 2023    Cystitis 2021    Diabetes mellitus     Gastroesophageal reflux disease 2023    Type 2 diabetes mellitus without complication 2021       Past Surgical History:   Procedure Laterality Date     SECTION      CHOLECYSTECTOMY      CHOLECYSTECTOMY             Past Hospitalization:     ER visit 10/23: shortness of breath and weakness; CT negative for PE     Cardiac meds:  Lasix 20 mg as needed  Metformin 500 mg BID        OBJECTIVE:     Vital Signs (Most Recent)  Vitals:    10/17/23 1542   BP: 129/76   Pulse: 92   SpO2: 97%   Weight: 82 kg (180 lb 12.4 oz)   Height: 5' 4" (1.626 m)         Physical Exam:  Neck: normal carotids, no bruits; normal JVP  Lungs :clear  Heart: RR, normal S1,S2, no murmurs, no gallops  Abd: no masses; no bruits;   Exts: normal DP and PT pulses bilaterally, normal radials; no edema noted               Labs    10/23: BNP normal; " troponin negative; TSH normal; CBC normal; GFR 58; LDL 74, A1c 6.3  Diagnostic Results:    1.EKG: 10/23: sinus; nonspecific T; otherwise normal   2.Echo: 3/23: normal EF; normal diastology   3. Stress test: nuclear stress: 10/22: negative for ischemia; normal EF   4. cath  5. Venous US:3/23 no DVT   Chart review:        ASSESSMENT/PLAN:     Blood pressures normal on no medications  Lipids great on no medications  No symptoms of angina or failure - all previous cardiac workup negative   DM: A1c 6.3    Plan: reassurance  No need for any further cardiac testing   Return as needed    Robert Gamble MD

## 2023-10-17 ENCOUNTER — OFFICE VISIT (OUTPATIENT)
Dept: CARDIOLOGY | Facility: CLINIC | Age: 78
End: 2023-10-17
Payer: MEDICARE

## 2023-10-17 VITALS
WEIGHT: 180.75 LBS | SYSTOLIC BLOOD PRESSURE: 129 MMHG | BODY MASS INDEX: 30.86 KG/M2 | DIASTOLIC BLOOD PRESSURE: 76 MMHG | OXYGEN SATURATION: 97 % | HEIGHT: 64 IN | HEART RATE: 92 BPM

## 2023-10-17 DIAGNOSIS — R60.0 BILATERAL LOWER EXTREMITY EDEMA: ICD-10-CM

## 2023-10-17 DIAGNOSIS — R60.0 BILATERAL EDEMA OF LOWER EXTREMITY: Primary | ICD-10-CM

## 2023-10-17 DIAGNOSIS — R06.02 SHORTNESS OF BREATH: ICD-10-CM

## 2023-10-17 PROCEDURE — 99999 PR PBB SHADOW E&M-EST. PATIENT-LVL III: CPT | Mod: PBBFAC,,, | Performed by: INTERNAL MEDICINE

## 2023-10-17 PROCEDURE — 99204 PR OFFICE/OUTPT VISIT, NEW, LEVL IV, 45-59 MIN: ICD-10-PCS | Mod: S$GLB,,, | Performed by: INTERNAL MEDICINE

## 2023-10-17 PROCEDURE — 3072F LOW RISK FOR RETINOPATHY: CPT | Mod: CPTII,S$GLB,, | Performed by: INTERNAL MEDICINE

## 2023-10-17 PROCEDURE — 1159F PR MEDICATION LIST DOCUMENTED IN MEDICAL RECORD: ICD-10-PCS | Mod: CPTII,S$GLB,, | Performed by: INTERNAL MEDICINE

## 2023-10-17 PROCEDURE — 3078F DIAST BP <80 MM HG: CPT | Mod: CPTII,S$GLB,, | Performed by: INTERNAL MEDICINE

## 2023-10-17 PROCEDURE — 1126F PR PAIN SEVERITY QUANTIFIED, NO PAIN PRESENT: ICD-10-PCS | Mod: CPTII,S$GLB,, | Performed by: INTERNAL MEDICINE

## 2023-10-17 PROCEDURE — 1160F RVW MEDS BY RX/DR IN RCRD: CPT | Mod: CPTII,S$GLB,, | Performed by: INTERNAL MEDICINE

## 2023-10-17 PROCEDURE — 3288F PR FALLS RISK ASSESSMENT DOCUMENTED: ICD-10-PCS | Mod: CPTII,S$GLB,, | Performed by: INTERNAL MEDICINE

## 2023-10-17 PROCEDURE — 99999 PR PBB SHADOW E&M-EST. PATIENT-LVL III: ICD-10-PCS | Mod: PBBFAC,,, | Performed by: INTERNAL MEDICINE

## 2023-10-17 PROCEDURE — 3074F SYST BP LT 130 MM HG: CPT | Mod: CPTII,S$GLB,, | Performed by: INTERNAL MEDICINE

## 2023-10-17 PROCEDURE — 3074F PR MOST RECENT SYSTOLIC BLOOD PRESSURE < 130 MM HG: ICD-10-PCS | Mod: CPTII,S$GLB,, | Performed by: INTERNAL MEDICINE

## 2023-10-17 PROCEDURE — 3288F FALL RISK ASSESSMENT DOCD: CPT | Mod: CPTII,S$GLB,, | Performed by: INTERNAL MEDICINE

## 2023-10-17 PROCEDURE — 3078F PR MOST RECENT DIASTOLIC BLOOD PRESSURE < 80 MM HG: ICD-10-PCS | Mod: CPTII,S$GLB,, | Performed by: INTERNAL MEDICINE

## 2023-10-17 PROCEDURE — 1159F MED LIST DOCD IN RCRD: CPT | Mod: CPTII,S$GLB,, | Performed by: INTERNAL MEDICINE

## 2023-10-17 PROCEDURE — 1101F PT FALLS ASSESS-DOCD LE1/YR: CPT | Mod: CPTII,S$GLB,, | Performed by: INTERNAL MEDICINE

## 2023-10-17 PROCEDURE — 99204 OFFICE O/P NEW MOD 45 MIN: CPT | Mod: S$GLB,,, | Performed by: INTERNAL MEDICINE

## 2023-10-17 PROCEDURE — 1126F AMNT PAIN NOTED NONE PRSNT: CPT | Mod: CPTII,S$GLB,, | Performed by: INTERNAL MEDICINE

## 2023-10-17 PROCEDURE — 3072F PR LOW RISK FOR RETINOPATHY: ICD-10-PCS | Mod: CPTII,S$GLB,, | Performed by: INTERNAL MEDICINE

## 2023-10-17 PROCEDURE — 1160F PR REVIEW ALL MEDS BY PRESCRIBER/CLIN PHARMACIST DOCUMENTED: ICD-10-PCS | Mod: CPTII,S$GLB,, | Performed by: INTERNAL MEDICINE

## 2023-10-17 PROCEDURE — 1101F PR PT FALLS ASSESS DOC 0-1 FALLS W/OUT INJ PAST YR: ICD-10-PCS | Mod: CPTII,S$GLB,, | Performed by: INTERNAL MEDICINE

## 2024-01-05 ENCOUNTER — HOSPITAL ENCOUNTER (EMERGENCY)
Facility: HOSPITAL | Age: 79
Discharge: HOME | End: 2024-01-05
Attending: EMERGENCY MEDICINE
Payer: MEDICARE

## 2024-01-05 ENCOUNTER — TELEPHONE (OUTPATIENT)
Dept: FAMILY MEDICINE | Facility: HOSPITAL | Age: 79
End: 2024-01-05
Payer: MEDICARE

## 2024-01-05 VITALS
OXYGEN SATURATION: 95 % | HEIGHT: 64 IN | DIASTOLIC BLOOD PRESSURE: 59 MMHG | TEMPERATURE: 98 F | BODY MASS INDEX: 29.02 KG/M2 | SYSTOLIC BLOOD PRESSURE: 134 MMHG | HEART RATE: 79 BPM | RESPIRATION RATE: 18 BRPM | WEIGHT: 170 LBS

## 2024-01-05 DIAGNOSIS — R53.1 WEAKNESS: ICD-10-CM

## 2024-01-05 DIAGNOSIS — U07.1 COVID-19 VIRUS DETECTED: ICD-10-CM

## 2024-01-05 DIAGNOSIS — R05.9 COUGH: ICD-10-CM

## 2024-01-05 DIAGNOSIS — U07.1 COVID-19 VIRUS INFECTION: Primary | ICD-10-CM

## 2024-01-05 LAB
ALBUMIN SERPL BCP-MCNC: 3.9 G/DL (ref 3.5–5.2)
ALP SERPL-CCNC: 127 U/L (ref 55–135)
ALT SERPL W/O P-5'-P-CCNC: 16 U/L (ref 10–44)
ANION GAP SERPL CALC-SCNC: 13 MMOL/L (ref 8–16)
AST SERPL-CCNC: 22 U/L (ref 10–40)
BASOPHILS # BLD AUTO: 0.02 K/UL (ref 0–0.2)
BASOPHILS NFR BLD: 0.2 % (ref 0–1.9)
BILIRUB SERPL-MCNC: 0.4 MG/DL (ref 0.1–1)
BNP SERPL-MCNC: 30 PG/ML (ref 0–99)
BUN SERPL-MCNC: 10 MG/DL (ref 8–23)
CALCIUM SERPL-MCNC: 9.3 MG/DL (ref 8.7–10.5)
CHLORIDE SERPL-SCNC: 105 MMOL/L (ref 95–110)
CO2 SERPL-SCNC: 22 MMOL/L (ref 23–29)
CREAT SERPL-MCNC: 1 MG/DL (ref 0.5–1.4)
DIFFERENTIAL METHOD BLD: ABNORMAL
EOSINOPHIL # BLD AUTO: 0.3 K/UL (ref 0–0.5)
EOSINOPHIL NFR BLD: 3.1 % (ref 0–8)
ERYTHROCYTE [DISTWIDTH] IN BLOOD BY AUTOMATED COUNT: 13.7 % (ref 11.5–14.5)
EST. GFR  (NO RACE VARIABLE): 58 ML/MIN/1.73 M^2
GLUCOSE SERPL-MCNC: 158 MG/DL (ref 70–110)
HCT VFR BLD AUTO: 40.6 % (ref 37–48.5)
HGB BLD-MCNC: 13.5 G/DL (ref 12–16)
IMM GRANULOCYTES # BLD AUTO: 0.03 K/UL (ref 0–0.04)
IMM GRANULOCYTES NFR BLD AUTO: 0.3 % (ref 0–0.5)
INFLUENZA A, MOLECULAR: NEGATIVE
INFLUENZA B, MOLECULAR: NEGATIVE
LYMPHOCYTES # BLD AUTO: 2.4 K/UL (ref 1–4.8)
LYMPHOCYTES NFR BLD: 27.8 % (ref 18–48)
MCH RBC QN AUTO: 30.6 PG (ref 27–31)
MCHC RBC AUTO-ENTMCNC: 33.3 G/DL (ref 32–36)
MCV RBC AUTO: 92 FL (ref 82–98)
MONOCYTES # BLD AUTO: 0.7 K/UL (ref 0.3–1)
MONOCYTES NFR BLD: 7.6 % (ref 4–15)
NEUTROPHILS # BLD AUTO: 5.3 K/UL (ref 1.8–7.7)
NEUTROPHILS NFR BLD: 61 % (ref 38–73)
NRBC BLD-RTO: 0 /100 WBC
PLATELET # BLD AUTO: 263 K/UL (ref 150–450)
PMV BLD AUTO: 9.1 FL (ref 9.2–12.9)
POTASSIUM SERPL-SCNC: 4.2 MMOL/L (ref 3.5–5.1)
PROT SERPL-MCNC: 7.8 G/DL (ref 6–8.4)
RBC # BLD AUTO: 4.41 M/UL (ref 4–5.4)
SARS-COV-2 RDRP RESP QL NAA+PROBE: POSITIVE
SODIUM SERPL-SCNC: 140 MMOL/L (ref 136–145)
SPECIMEN SOURCE: NORMAL
TROPONIN I SERPL DL<=0.01 NG/ML-MCNC: <0.006 NG/ML (ref 0–0.03)
WBC # BLD AUTO: 8.63 K/UL (ref 3.9–12.7)

## 2024-01-05 PROCEDURE — 85025 COMPLETE CBC W/AUTO DIFF WBC: CPT | Performed by: NURSE PRACTITIONER

## 2024-01-05 PROCEDURE — 93010 ELECTROCARDIOGRAM REPORT: CPT | Mod: ,,, | Performed by: INTERNAL MEDICINE

## 2024-01-05 PROCEDURE — 83880 ASSAY OF NATRIURETIC PEPTIDE: CPT | Performed by: NURSE PRACTITIONER

## 2024-01-05 PROCEDURE — 84484 ASSAY OF TROPONIN QUANT: CPT | Performed by: NURSE PRACTITIONER

## 2024-01-05 PROCEDURE — U0002 COVID-19 LAB TEST NON-CDC: HCPCS | Performed by: NURSE PRACTITIONER

## 2024-01-05 PROCEDURE — 80053 COMPREHEN METABOLIC PANEL: CPT | Performed by: NURSE PRACTITIONER

## 2024-01-05 PROCEDURE — 99285 EMERGENCY DEPT VISIT HI MDM: CPT | Mod: 25

## 2024-01-05 PROCEDURE — 93005 ELECTROCARDIOGRAM TRACING: CPT

## 2024-01-05 PROCEDURE — 87502 INFLUENZA DNA AMP PROBE: CPT | Performed by: NURSE PRACTITIONER

## 2024-01-05 NOTE — ED PROVIDER NOTES
Encounter Date: 2024       History     Chief Complaint   Patient presents with    generalized weakness      Pt bib [facility] from home with c/o generalized weakness x 3 days with congestion, denies CP SOB or cough      Patient is a 78 year old female who complains of generalized weakness and fatigue.  She has also had mild congestion.  No vomiting or diarrhea.  No fever.      Review of patient's allergies indicates:   Allergen Reactions    Decadron [dexamethasone] Other (See Comments)     Syncope (causes spike in blood glucose with previous bout of syncope)    Celestone [betamethasone sodium phosphate]     Celestone [betamethasone] Other (See Comments)     Syncope (causes spike in blood glucose with previous bout of syncope)    Decadron-la      Past Medical History:   Diagnosis Date    Arthritis of both knees 2023    Cystitis 2021    Diabetes mellitus     Gastroesophageal reflux disease 2023    Type 2 diabetes mellitus without complication 2021     Past Surgical History:   Procedure Laterality Date     SECTION      CHOLECYSTECTOMY      CHOLECYSTECTOMY       Family History   Problem Relation Age of Onset    Diabetes Mother     No Known Problems Father      Social History     Tobacco Use    Smoking status: Former     Current packs/day: 0.00     Types: Cigarettes     Quit date: 6/10/2014     Years since quittin.5    Smokeless tobacco: Never   Substance Use Topics    Alcohol use: No    Drug use: No     Review of Systems   Constitutional:  Positive for fatigue. Negative for fever.   Respiratory:  Negative for shortness of breath.    Neurological:  Positive for weakness.       Physical Exam     Initial Vitals [24 1053]   BP Pulse Resp Temp SpO2   (!) 142/61 78 18 98.3 °F (36.8 °C) 97 %      MAP       --         Physical Exam    Nursing note and vitals reviewed.  Constitutional: No distress.   HENT:   Head: Atraumatic.   Eyes: EOM are normal.   Cardiovascular:  Normal rate, regular  rhythm and normal heart sounds.           Pulmonary/Chest: Breath sounds normal.   Abdominal: Abdomen is soft. There is no abdominal tenderness.   Musculoskeletal:         General: No edema. Normal range of motion.     Neurological: She is alert and oriented to person, place, and time.   Skin: Skin is warm and dry.   Psychiatric: Thought content normal.         ED Course   Procedures  Labs Reviewed   CBC W/ AUTO DIFFERENTIAL - Abnormal; Notable for the following components:       Result Value    MPV 9.1 (*)     All other components within normal limits   COMPREHENSIVE METABOLIC PANEL - Abnormal; Notable for the following components:    CO2 22 (*)     Glucose 158 (*)     eGFR 58 (*)     All other components within normal limits   SARS-COV-2 RNA AMPLIFICATION, QUAL - Abnormal; Notable for the following components:    SARS-CoV-2 RNA, Amplification, Qual Positive (*)     All other components within normal limits   INFLUENZA A & B BY MOLECULAR   B-TYPE NATRIURETIC PEPTIDE   TROPONIN I        ECG Results              EKG 12-lead (In process)  Result time 01/05/24 14:33:52      In process by Interface, Lab In [facility] (01/05/24 14:33:52)                   Narrative:    Test Reason : R53.1,    Vent. Rate : 076 BPM     Atrial Rate : 076 BPM     P-R Int : 126 ms          QRS Dur : 072 ms      QT Int : 394 ms       P-R-T Axes : 050 060 031 degrees     QTc Int : 443 ms    Normal sinus rhythm  Normal ECG  When compared with ECG of 05-OCT-2023 14:21,  No significant change was found    Referred By: [email or web address]   SELF           Confirmed By:                                   Imaging Results              X-Ray Chest 1 View (Final result)  Result time 01/05/24 15:47:04      Final result by Berhane Acosta DO (01/05/24 15:47:04)                   Impression:      Please see above      Electronically signed by: Berhane Acosta DO  Date:    01/05/2024  Time:    15:47               Narrative:    EXAMINATION:  XR CHEST 1 VIEW    CLINICAL  HISTORY:  COVID-19 infection;    TECHNIQUE:  Single frontal view of the chest was performed.    COMPARISON:  10/05/2023    FINDINGS:  No significant change from prior.  No new lung opacity.  No lung consolidation.  Continued atherosclerotic aorta.  No large pleural effusion or pneumothorax.  Continued nonspecific elevation right lung base.  Degenerative change visualized shoulder joints.  Further evaluation as warranted clinically.                                       Medications - No data to display  Medical Decision Making  DDx  :  Including but not limited to :  Dehydration, electrolyte abnormality, influenza, COVID-19 infection, pneumonia.    Emergent evaluation of a 78-year-old female with generalized weakness and fatigue.  She has had mild congestion.  Her COVID-19 swab is positive.  Chest x-ray shows no infiltrates, her lungs are clear to auscultation and oxygen saturations are 97% on room air.  Due to the patient's age and medical conditions I will place her on Paxlovid.  She should follow up with the primary physician for recheck when able but most importantly return to the ED for any developing shortness of breath or any other urgent concerns.    Amount and/or Complexity of Data Reviewed  Labs: ordered.     Details: CBC is unremarkable.  CMP with a glucose of 158.  COVID-19 swab is positive.    Radiology: ordered.     Details: Chest x-ray shows no changes from a prior film with no new lung opacities.    Risk  Prescription drug management.                                      Clinical Impression:  Final diagnoses:  [R53.1] Weakness  [R05.9] Cough  [U07.1] COVID-19 virus infection (Primary)          ED Disposition Condition    Discharge Stable          ED Prescriptions       Medication Sig Dispense Start Date End Date Auth. Provider    nirmatrelvir-ritonavir 300 mg (150 mg x 2)-100 mg copackaged tablets (EUA) Take 3 tablets by mouth 2 (two) times daily for 5 days. Each dose contains 2 nirmatrelvir (pink  tablets) and 1 ritonavir (white tablet). Take all 3 tablets together 30 tablet 1/5/2024 1/10/2024 Jareth Tomlinson MD          Follow-up Information       Follow up With Specialties Details Why Contact Info    Sabrina Dooley MD Family Medicine Schedule an appointment as soon as possible for a visit   [address] [address] [phone]      [facility] - [facility] Dept Emergency Medicine  If symptoms worsen [address]  [facility] [address] [phone]             Jareth Tomlinson MD  01/06/24 0604

## 2024-01-05 NOTE — TELEPHONE ENCOUNTER
Msg left for this patient.   Pt was seen in ED today and is now + for Covid.   ----- Message from Nyasia Persaud sent at 1/4/2024  8:31 AM CST -----  Regarding: appointment  Type:  Appointment    Who Called: pt  Would the patient rather a call back or a response via [facility]? Call  Best Call Back Number: [phone]  Additional Information: pt stated she is feeling really bad, would like to be seen and would like a call back she stated  is on her card but I saw  as the PCP.

## 2024-01-13 ENCOUNTER — HOSPITAL ENCOUNTER (EMERGENCY)
Facility: HOSPITAL | Age: 79
Discharge: HOME | End: 2024-01-13
Attending: EMERGENCY MEDICINE
Payer: MEDICARE

## 2024-01-13 ENCOUNTER — NURSE TRIAGE (OUTPATIENT)
Dept: ADMINISTRATIVE | Facility: CLINIC | Age: 79
End: 2024-01-13
Payer: MEDICARE

## 2024-01-13 VITALS
RESPIRATION RATE: 17 BRPM | HEIGHT: 64 IN | OXYGEN SATURATION: 98 % | DIASTOLIC BLOOD PRESSURE: 88 MMHG | BODY MASS INDEX: 27.31 KG/M2 | HEART RATE: 76 BPM | SYSTOLIC BLOOD PRESSURE: 143 MMHG | WEIGHT: 160 LBS | TEMPERATURE: 98 F

## 2024-01-13 DIAGNOSIS — R53.1 GENERALIZED WEAKNESS: ICD-10-CM

## 2024-01-13 DIAGNOSIS — R07.9 CHEST PAIN, UNSPECIFIED TYPE: Primary | ICD-10-CM

## 2024-01-13 DIAGNOSIS — R07.9 CHEST PAIN: ICD-10-CM

## 2024-01-13 LAB
ALBUMIN SERPL BCP-MCNC: 4.2 G/DL (ref 3.5–5.2)
ALP SERPL-CCNC: 117 U/L (ref 38–126)
ALT SERPL W/O P-5'-P-CCNC: 17 U/L (ref 10–44)
ANION GAP SERPL CALC-SCNC: 10 MMOL/L (ref 8–16)
AST SERPL-CCNC: 24 U/L (ref 15–46)
BASOPHILS # BLD AUTO: 0.03 K/UL (ref 0–0.2)
BASOPHILS NFR BLD: 0.3 % (ref 0–1.9)
BILIRUB SERPL-MCNC: 0.2 MG/DL (ref 0.1–1)
CALCIUM SERPL-MCNC: 9.2 MG/DL (ref 8.7–10.5)
CHLORIDE SERPL-SCNC: 107 MMOL/L (ref 95–110)
CO2 SERPL-SCNC: 25 MMOL/L (ref 23–29)
CREAT SERPL-MCNC: 0.99 MG/DL (ref 0.5–1.4)
DIFFERENTIAL METHOD BLD: ABNORMAL
EOSINOPHIL # BLD AUTO: 0.2 K/UL (ref 0–0.5)
EOSINOPHIL NFR BLD: 2.2 % (ref 0–8)
ERYTHROCYTE [DISTWIDTH] IN BLOOD BY AUTOMATED COUNT: 13.3 % (ref 11.5–14.5)
EST. GFR  (NO RACE VARIABLE): 58.4 ML/MIN/1.73 M^2
GLUCOSE SERPL-MCNC: 121 MG/DL (ref 70–110)
HCT VFR BLD AUTO: 39.5 % (ref 37–48.5)
HGB BLD-MCNC: 12.9 G/DL (ref 12–16)
IMM GRANULOCYTES # BLD AUTO: 0.03 K/UL (ref 0–0.04)
IMM GRANULOCYTES NFR BLD AUTO: 0.3 % (ref 0–0.5)
LYMPHOCYTES # BLD AUTO: 3.5 K/UL (ref 1–4.8)
LYMPHOCYTES NFR BLD: 38.9 % (ref 18–48)
MCH RBC QN AUTO: 30.1 PG (ref 27–31)
MCHC RBC AUTO-ENTMCNC: 32.7 G/DL (ref 32–36)
MCV RBC AUTO: 92 FL (ref 82–98)
MONOCYTES # BLD AUTO: 0.7 K/UL (ref 0.3–1)
MONOCYTES NFR BLD: 7.5 % (ref 4–15)
NEUTROPHILS # BLD AUTO: 4.6 K/UL (ref 1.8–7.7)
NEUTROPHILS NFR BLD: 50.8 % (ref 38–73)
NRBC BLD-RTO: 0 /100 WBC
PLATELET # BLD AUTO: 349 K/UL (ref 150–450)
PMV BLD AUTO: 9.1 FL (ref 9.2–12.9)
POTASSIUM SERPL-SCNC: 3.9 MMOL/L (ref 3.5–5.1)
PROT SERPL-MCNC: 7.7 G/DL (ref 6–8.4)
RBC # BLD AUTO: 4.28 M/UL (ref 4–5.4)
SODIUM SERPL-SCNC: 142 MMOL/L (ref 136–145)
TROPONIN I SERPL-MCNC: 0.01 NG/ML (ref 0.01–0.03)
UUN UR-MCNC: 15 MG/DL (ref 7–17)
WBC # BLD AUTO: 9.02 K/UL (ref 3.9–12.7)

## 2024-01-13 PROCEDURE — 25500020 PHARM REV CODE 255: Mod: ER | Performed by: EMERGENCY MEDICINE

## 2024-01-13 PROCEDURE — 25000003 PHARM REV CODE 250: Mod: ER | Performed by: EMERGENCY MEDICINE

## 2024-01-13 PROCEDURE — 93010 ELECTROCARDIOGRAM REPORT: CPT | Mod: ,,, | Performed by: INTERNAL MEDICINE

## 2024-01-13 PROCEDURE — 85025 COMPLETE CBC W/AUTO DIFF WBC: CPT | Mod: ER | Performed by: EMERGENCY MEDICINE

## 2024-01-13 PROCEDURE — 93005 ELECTROCARDIOGRAM TRACING: CPT | Mod: ER

## 2024-01-13 PROCEDURE — 84484 ASSAY OF TROPONIN QUANT: CPT | Mod: ER | Performed by: EMERGENCY MEDICINE

## 2024-01-13 PROCEDURE — 99285 EMERGENCY DEPT VISIT HI MDM: CPT | Mod: 25,ER

## 2024-01-13 PROCEDURE — 99900035 HC TECH TIME PER 15 MIN (STAT): Mod: ER

## 2024-01-13 PROCEDURE — 80053 COMPREHEN METABOLIC PANEL: CPT | Mod: ER | Performed by: EMERGENCY MEDICINE

## 2024-01-13 RX ORDER — NAPROXEN SODIUM 220 MG/1
324 TABLET, FILM COATED ORAL
Status: COMPLETED | OUTPATIENT
Start: 2024-01-13 | End: 2024-01-13

## 2024-01-13 RX ADMIN — IOHEXOL 100 ML: 350 INJECTION, SOLUTION INTRAVENOUS at 08:01

## 2024-01-13 RX ADMIN — ASPIRIN 324 MG: 81 TABLET, CHEWABLE ORAL at 07:01

## 2024-01-13 NOTE — TELEPHONE ENCOUNTER
[place]    PCP:  Dr. Sabrina Dooley/She reports Dr. Elisha Piper    She reports had Covid + on 1/5.  She completed meds on Friday.  C/O generalized weakness, moderate SOB, productive cough with white phlegm, and chest tightness.  H/O fall.  Denies fever.  Per protocol, care advised is go to ED now.  Pt VU.  Advised to call for worsening/questions/concerns.  VU.    Reason for Disposition   [1] MODERATE difficulty breathing (e.g., speaks in phrases, SOB even at rest, pulse 100-120) AND [2] new-onset or WORSE   Difficulty breathing    Additional Information   Negative: SEVERE difficulty breathing (e.g., struggling for each breath, speaks in single words)   Negative: [1] SEVERE weakness (e.g., can't stand or can barely walk) AND [2] new-onset or WORSE   Negative: Difficult to awaken or acting confused (e.g., disoriented, slurred speech)   Negative: Bluish (or gray) lips or face now   Negative: Sounds like a life-threatening emergency to the triager   Negative: SEVERE difficulty breathing (e.g., struggling for each breath, speaks in single words)   Negative: Shock suspected (e.g., cold/pale/clammy skin, too weak to stand, low BP, rapid pulse)   Negative: Difficult to awaken or acting confused (e.g., disoriented, slurred speech)   Negative: [1] Fainted > 15 minutes ago AND [2] still feels too weak or dizzy to stand   Negative: [1] SEVERE weakness (i.e., unable to walk or barely able to walk, requires support) AND [2] new-onset or worsening   Negative: Sounds like a life-threatening emergency to the triager    Protocols used: Coronavirus (COVID-19) Persisting Symptoms Follow-up Call-A-AH, Weakness (Generalized) and Fatigue-A-AH

## 2024-01-14 NOTE — ED NOTES
Discharge education provided. Pt verbalized understanding. No questions or concerns at this time. AVS provided. IV removed. Pt discharged.   
yes

## 2024-01-14 NOTE — ED PROVIDER NOTES
ED Provider Note - 2024    History     Chief Complaint   Patient presents with    Chest Pain     Chest pain x2 days. States throbbing pain. Pt states she completed her medication for covid. Dx on . States she cannot catch her breath.      Patient currently presents with chief complaint of chest pain and generalized weakness.  This began 2-3 days ago.  This is localized to the left region of the chest near the shoulder.  Patient reports  SOB associated with this process and denies associated nausea, palpitations, and dizziness.  Patient's pain does not radiate.  Patient denies aspirin use in the last 24 hours. Risk factors for CAD include DM.  Endorses no risk factors for VTE.  Patient denies leg swelling, history of VTE, immobilization, malignancy, prolonged travel, recent surgery, and recent trauma      Review of patient's allergies indicates:   Allergen Reactions    Decadron [dexamethasone] Other (See Comments)     Syncope (causes spike in blood glucose with previous bout of syncope)    Celestone [betamethasone sodium phosphate]     Celestone [betamethasone] Other (See Comments)     Syncope (causes spike in blood glucose with previous bout of syncope)    Decadron-la      Past Medical History:   Diagnosis Date    Arthritis of both knees 2023    Cystitis 2021    Diabetes mellitus     Gastroesophageal reflux disease 2023    Type 2 diabetes mellitus without complication 2021     Past Surgical History:   Procedure Laterality Date     SECTION      CHOLECYSTECTOMY      CHOLECYSTECTOMY       Family History   Problem Relation Age of Onset    Diabetes Mother     No Known Problems Father      Social History     Tobacco Use    Smoking status: Former     Current packs/day: 0.00     Types: Cigarettes     Quit date: 6/10/2014     Years since quittin.6    Smokeless tobacco: Never   Substance Use Topics    Alcohol use: No    Drug use: No     Review of Systems   Constitutional:  Negative for  chills and fever.   HENT:  Negative for congestion and rhinorrhea.    Respiratory:  Negative for cough and shortness of breath.    Cardiovascular:  Positive for chest pain. Negative for palpitations and leg swelling.   Gastrointestinal:  Negative for abdominal pain, diarrhea and vomiting.   Genitourinary:  Negative for difficulty urinating and dysuria.   Skin:  Negative for color change and rash.   Neurological:  Negative for dizziness and light-headedness.   Hematological:  Negative for adenopathy. Does not bruise/bleed easily.       Physical Exam     Initial Vitals [01/13/24 1859]   BP Pulse Resp Temp SpO2   129/69 90 18 98.2 °F (36.8 °C) 99 %      MAP       --           Physical Exam    Nursing note and vitals reviewed.  Constitutional: She appears well-developed and well-nourished. She is not diaphoretic. No distress.   HENT:   Head: Normocephalic and atraumatic.   Nose: Nose normal.   Mouth/Throat: Oropharynx is clear and moist.   Eyes: Conjunctivae and EOM are normal. Pupils are equal, round, and reactive to light. No scleral icterus.   Cardiovascular:  Normal rate, regular rhythm, normal heart sounds and intact distal pulses.           Pulmonary/Chest: Breath sounds normal. No respiratory distress.   Abdominal: Abdomen is soft. She exhibits no distension. There is no abdominal tenderness.   Musculoskeletal:         General: No edema. Normal range of motion.     Neurological: She is alert and oriented to person, place, and time. She has normal strength.   Skin: Skin is warm and dry.         ED Course   Procedures                   MDM  Differential Diagnoses   Based on available history, the working differential diagnoses considered during this evaluation include but are not limited to acute coronary syndrome, pulmonary embolus, esophageal perforation, aortic dissection, pneumonia, and pneumothorax as well as musculoskeletal sources including chest wall strain and costochondritis.      LABS     Labs Reviewed    CBC W/ AUTO DIFFERENTIAL - Abnormal; Notable for the following components:       Result Value    MPV 9.1 (*)     All other components within normal limits   COMPREHENSIVE METABOLIC PANEL - Abnormal; Notable for the following components:    Glucose 121 (*)     eGFR 58.4 (*)     All other components within normal limits   TROPONIN I           All available results from the labs ordered were independently reviewed. with findings as follows:  CBC and CMP unremarkable.  Troponin level negative.     Imaging     Imaging Results              CTA Chest Non-Coronary (PE Studies) (Final result)  Result time 01/13/24 21:40:12      Final result by Rosario Steve MD (01/13/24 21:40:12)                   Impression:      Negative for PE      Electronically signed by: Roasrio Steve  Date:    01/13/2024  Time:    21:40               Narrative:    EXAMINATION:  CTA CHEST NON CORONARY (PE STUDIES)    CLINICAL HISTORY:  Pulmonary embolism (PE) suspected, unknown D-dimer;    TECHNIQUE:  Angiographic technique PE protocol with MIPS and post processing volumetric    Iterative technique with low-dose parameters for diminishing radiation dose as reasonably achievable    COMPARISON:  Radiographic correlation and prior CT    FINDINGS:  No pulmonary embolus seen.  No pleural effusion    Bilateral atelectasis inferior thorax.    Hiatal hernia                                       X-Ray Chest AP Portable (Final result)  Result time 01/13/24 19:50:18      Final result by Angelo Foote MD (01/13/24 19:50:18)                   Impression:      No acute abnormality.      Electronically signed by: Angelo Foote  Date:    01/13/2024  Time:    19:50               Narrative:    EXAMINATION:  XR CHEST AP PORTABLE    CLINICAL HISTORY:  Chest Pain;    TECHNIQUE:  Single frontal view of the chest was performed.    COMPARISON:  Multiple priors.    FINDINGS:  The lungs are clear, with normal appearance of pulmonary vasculature and no pleural  effusion or pneumothorax.    The cardiac silhouette is normal in size. The hilar and mediastinal contours are unremarkable.    Bones are intact.                                       X-Rays:   Independently Interpreted Readings:   Chest X-Ray: Normal heart size.  No infiltrates.  No acute abnormalities.        EKG   EKG Readings: (Independently Interpreted)   Initial Reading: No STEMI. Rhythm: Normal Sinus Rhythm. Heart Rate: 91. Ectopy: No Ectopy. Conduction: Normal. Axis: Normal.       ED Management/Discussion     Medications   aspirin chewable tablet 324 mg (324 mg Oral Given 1/13/24 1942)   iohexoL (OMNIPAQUE 350) injection 100 mL (100 mLs Intravenous Given 1/13/24 2050)                   The patient's list of active medical problems, social history, medications, and allergies as documented per RN staff has been reviewed.             Given the extended period of sustained symptoms prior to ED presentation, a satisfactory EKG, unremarkable cardiac monitoring, and single troponin level have been used to rule out ACS.  Additionally, I have no considerable suspicion for aortic dissection/aneurysm, esophageal perforation, pneumothorax or acute pulmonary complications based on the presentation, exam, lab results, and imaging.    On final assessment, the patient appears suitable for discharge.  I see no indication of an emergent process beyond that addressed during our encounter but have duly counseled the patient/family regarding the need for prompt follow-up as well as the indications that should prompt immediate return to the emergency room.  The patient/family has been provided with language -specific verbal and printed direction regarding our final diagnosis(es) as well as instructions regarding use of OTC and/or Rx medications intended to manage the patient's aforementioned conditions including:  ED Prescriptions    None           Patient has been advised of the following recommended follow-up  instructions:  Follow-up Information       Follow up With Specialties Details Why Contact Info    Sabrina Dooley MD Family Medicine Schedule an appointment as soon as possible for a visit  for reassessment [address] [address] [phone]      [facility] - Emergency Dept Emergency Medicine Go to  As needed, If symptoms worsen [address] [facility] [address] [phone]          The patient/family communicates understanding of all this information and all remaining questions and concerns were addressed at this time.      Referrals:  No orders of the defined types were placed in this encounter.      CLINICAL IMPRESSION    ICD-10-CM ICD-9-CM   1. Chest pain, unspecified type  R07.9 786.50   2. Generalized weakness  R53.1 780.79          ED Disposition Condition    Discharge Stable                 Jaylon Taylor MD  01/13/24 5313

## 2024-01-17 ENCOUNTER — NURSE TRIAGE (OUTPATIENT)
Dept: ADMINISTRATIVE | Facility: CLINIC | Age: 79
End: 2024-01-17
Payer: MEDICARE

## 2024-01-17 NOTE — TELEPHONE ENCOUNTER
Pt currently has covid. Has been to the ED twice. Pt is concerned she is not feeling better. Pt is still feeling very weak, requires a lot of rest periods between small amounts of activity. Pt asking if there are any vitamins she can/should be taking to help her feel better and back to normal as she states she is usually very active.    Dispo- home care. Informed pt I will send message to pcp's office requesting call back with info on appropriate vitamins to take post covid. Pt verbalizes understanding.   Reason for Disposition   COVID-19 Disease, questions about    Additional Information   Negative: SEVERE difficulty breathing (e.g., struggling for each breath, speaks in single words)   Negative: Difficult to awaken or acting confused (e.g., disoriented, slurred speech)   Negative: Bluish (or gray) lips or face now   Negative: Shock suspected (e.g., cold/pale/clammy skin, too weak to stand, low BP, rapid pulse)   Negative: Sounds like a life-threatening emergency to the triager   Negative: SEVERE or constant chest pain or pressure  (Exception: Mild central chest pain, present only when coughing.)   Negative: MODERATE difficulty breathing (e.g., speaks in phrases, SOB even at rest, pulse 100-120)   Negative: Headache and stiff neck (can't touch chin to chest)   Negative: Oxygen level (e.g., pulse oximetry) 90% or lower   Negative: Chest pain or pressure  (Exception: MILD central chest pain, present only when coughing.)   Negative: Drinking very little and dehydration suspected (e.g., no urine > 12 hours, very dry mouth, very lightheaded)   Negative: Patient sounds very sick or weak to the triager   Negative: MILD difficulty breathing (e.g., minimal/no SOB at rest, SOB with walking, pulse <100)   Negative: Fever > 103 F (39.4 C)   Negative: Fever > 101 F (38.3 C) and over 60 years of age   Negative: Fever > 100.0 F (37.8 C) and bedridden (e.g., CVA, chronic illness, recovering from surgery)   Negative: HIGH RISK  patient (e.g., weak immune system, age > 64 years, obesity with BMI of 30 or higher, pregnant, chronic lung disease or other chronic medical condition) and COVID symptoms (e.g., cough, fever)  (Exceptions: Already seen by doctor or NP/PA and no new or worsening symptoms.)   Negative: HIGH RISK patient and influenza is widespread in the community and ONE OR MORE respiratory symptoms: cough, sore throat, runny or stuffy nose   Negative: HIGH RISK patient and influenza exposure within the last 7 days and ONE OR MORE respiratory symptoms: cough, sore throat, runny or stuffy nose   Negative: Oxygen level (e.g., pulse oximetry) 91 to 94%   Negative: COVID-19 infection suspected by caller or triager and mild symptoms (cough, fever, or others) and negative COVID-19 rapid test   Negative: Fever present > 3 days (72 hours)   Negative: Fever returns after gone for over 24 hours and symptoms worse or not improved   Negative: Continuous (nonstop) coughing interferes with work or school and no improvement using cough treatment per Care Advice   Negative: Cough present > 3 weeks    Protocols used: Coronavirus (COVID-19) Diagnosed or [id number]

## 2024-02-05 ENCOUNTER — OFFICE VISIT (OUTPATIENT)
Dept: FAMILY MEDICINE | Facility: HOSPITAL | Age: 79
End: 2024-02-05
Payer: MEDICARE

## 2024-02-05 VITALS
OXYGEN SATURATION: 99 % | HEIGHT: 64 IN | SYSTOLIC BLOOD PRESSURE: 106 MMHG | DIASTOLIC BLOOD PRESSURE: 60 MMHG | BODY MASS INDEX: 30.75 KG/M2 | WEIGHT: 180.13 LBS | HEART RATE: 79 BPM

## 2024-02-05 DIAGNOSIS — R53.83 FATIGUE, UNSPECIFIED TYPE: ICD-10-CM

## 2024-02-05 DIAGNOSIS — R06.02 SHORTNESS OF BREATH: Primary | ICD-10-CM

## 2024-02-05 PROCEDURE — 99214 OFFICE O/P EST MOD 30 MIN: CPT

## 2024-02-05 NOTE — PROGRESS NOTES
[facility]    Subjective:     Aretha Holt is a 78 y.o. year old female with PMHx of T2DM and MR  who presents to clinic for fatigue and shortness of breath.    Fatigue and shortness of breath:   The patient states that she was diagnosed with COVID-19 on January 4, 2024 and has been experiencing weakness and shortness of breath since.  She endorsed a fall at that time, however I have not seen any documentation about this in my chart review so far.  She was brought to the emergency department and subsequently treated with Paxlovid for 5 days which she endorses compliance.  However, she states that her shortness of breath and fatigue have not resolved.  She endorses that she also has decreased interest in doing hobbies like art work and going out to [place].  During the interview, she also endorsed that she would wake up and lay in bed longer than usual.  She states that she is still able to maintain her household and cook her meals. She expressed concerns about replacing a roof on her house, being debt free, finances, and falling.    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    RUQ pain 03/05/2023    Intermittent left-sided chest pain 03/05/2023    Arthritis of both knees 02/27/2023    Gastroesophageal reflux disease 02/27/2023    Routine eye exam 02/25/2023    Delayed immunizations 02/25/2023    Postmenopausal 02/04/2022    Mitral valve insufficiency 02/04/2022    Pain of foot 02/04/2022    Debility 06/13/2021    Anxiety 06/13/2021    Type 2 diabetes mellitus without complication, without long-term current use of insulin 06/13/2021    Left shoulder pain 06/12/2021    Bilateral edema of lower extremity 05/21/2017        Review of patient's allergies indicates:   Allergen Reactions    Decadron [dexamethasone] Other (See Comments)     Syncope (causes spike in blood glucose with  "previous bout of syncope)    Celestone [betamethasone sodium phosphate]     Celestone [betamethasone] Other (See Comments)     Syncope (causes spike in blood glucose with previous bout of syncope)    Decadron-la         Past Medical History:   Diagnosis Date    Arthritis of both knees 2023    Cystitis 2021    Diabetes mellitus     Gastroesophageal reflux disease 2023    Type 2 diabetes mellitus without complication 2021      Past Surgical History:   Procedure Laterality Date     SECTION      CHOLECYSTECTOMY      CHOLECYSTECTOMY        Family History   Problem Relation Age of Onset    Diabetes Mother     No Known Problems Father       Social History     Tobacco Use    Smoking status: Former     Current packs/day: 0.00     Types: Cigarettes     Quit date: 6/10/2014     Years since quittin.6    Smokeless tobacco: Never   Substance Use Topics    Alcohol use: No      Review of Systems   Constitutional:  Negative for chills and fever.   Respiratory:  Positive for shortness of breath.    Cardiovascular:  Negative for chest pain and leg swelling.   Gastrointestinal:  Negative for nausea and vomiting.     Objective:     Vitals:    24 1448   BP: 106/60   Pulse: 79   SpO2: 99%  Comment: on room air   Weight: 81.7 kg (180 lb 1.9 oz)   Height: 5' 4" (1.626 m)     Physical Exam  Constitutional:       General: She is not in acute distress.  HENT:      Head: Normocephalic.      Right Ear: External ear normal.      Left Ear: External ear normal.   Eyes:      Extraocular Movements: Extraocular movements intact.   Cardiovascular:      Heart sounds: Normal heart sounds.   Pulmonary:      Effort: Pulmonary effort is normal. No respiratory distress.      Breath sounds: Normal breath sounds. No stridor. No wheezing, rhonchi or rales.   Musculoskeletal:         General: Normal range of motion.      Cervical back: Normal range of motion.   Skin:     General: Skin is warm and dry.   Neurological:      " "Mental Status: She is alert.   Psychiatric:         Mood and Affect: Mood normal.       Assessment/Plan:     Aretha Holt is a 78 y.o. year old female who presents to clinic for shortness of breath and fatigue.    1. Shortness of breath  The patient appears to have presented with similar symptoms predating her Covid diagnosis. Her cardiac workup was unremarkable. Her SpO2 in clinic was 99% and she did NOT appear in respiratory distress. The shortness of breath could be secondary to a pulmonary etiology, especially given the patient's smoking history.   Plan:  Will get pulmonary function tests to assess for possible obstructive or restrictive pattern.  - Complete PFT w/ bronchodilator; Future    2. Fatigue, unspecified type  Prolonged recovery from Covid may be the etiology, however, as with the shortness of breath, the patient appears to have presented with similar symptoms prior to her Covid diagnosis. The patient was noted to have a GAD7 of 3 and PHQ9 of 7, however, she endorse several findings suggestive of a depressive syndrome including, decreased energy, loss of interest in hobbies, change in appetite. She also started to become tearful when discussing some of her concerns as stated in the HPI. The patient was offered antidepressants at his visit, but declined stating that she "did not want to be on more medications".  Plan:  Will refer the patient to psychology, as she was amenable to talk therapy if indicated  Will continue to monitor  - Ambulatory referral/consult to Psychology; Future  .    Follow-up:1 month    Case discussed with staff: Dr. Castorena    ________________________  Anish Amor Jr, MD  [facility] PGY-1        This note was partially created using [employer] Voice Recognition software. Typographical and content errors may occur with this process. While efforts are made to detect and correct such errors, in some cases errors will persist. For this reason, wording in this document " should be considered in the proper context and not strictly verbatim.

## 2024-02-28 ENCOUNTER — OFFICE VISIT (OUTPATIENT)
Dept: FAMILY MEDICINE | Facility: HOSPITAL | Age: 79
End: 2024-02-28
Payer: MEDICARE

## 2024-02-28 DIAGNOSIS — R06.02 SHORTNESS OF BREATH: Primary | ICD-10-CM

## 2024-02-28 DIAGNOSIS — R53.83 FATIGUE, UNSPECIFIED TYPE: ICD-10-CM

## 2024-02-28 PROCEDURE — 99213 OFFICE O/P EST LOW 20 MIN: CPT

## 2024-03-03 VITALS
SYSTOLIC BLOOD PRESSURE: 112 MMHG | DIASTOLIC BLOOD PRESSURE: 72 MMHG | WEIGHT: 178.81 LBS | HEIGHT: 64 IN | OXYGEN SATURATION: 98 % | HEART RATE: 78 BPM | BODY MASS INDEX: 30.53 KG/M2

## 2024-03-04 NOTE — PROGRESS NOTES
[facility] FAMILY PRACTICE CLINIC NOTE  Follow-up Visit      SUBJECTIVE:     Patient: Aretha Holt is a 78 y.o. female.    Chief Compliant:   Chief Complaint   Patient presents with    Shortness of Breath    Fatigue       History of Present Illness:  78 y.o. year old female with PMHx of T2DM and MR  who presents to clinic for fatigue and shortness of breath.     Per previous visit with Dr. Amor, patient was ordered PFT for SOB to further assess etiology in the setting of adequate oxygen saturation and normal CXR and CT PE from previous ED visit. Patient endorses she has not been contacted by anyone for PT. She expresses the same concerns citing that she is not back at her baseline ever since getting COVID back in the January of this year. She reports getting short of breath during activities she would normally do at home. Her dyspnea on exertion has been constant ever since COVID. O2 saturation done in clinic noted to be 98%.     Patient was additional given referral to psychology in the setting of fatigue related to psychosocial stressors with depressive symptoms including decreased energy, loss of interest, and changes in appetite dating back prior to COVID. Patient was additionally offered antidepressants at the time, however refused. Patient was also instructed of psychology referral, however, denies wanting to see psychologist due to not wanting to be on medications for potential depression. Patient was instructed that therapy (as opposed to medication) is offered via psychology, however, did not appear amenable due to Cheondoism reasons.        Review of Systems   Constitutional:  Positive for malaise/fatigue. Negative for chills and fever.   HENT:  Negative for congestion and sore throat.    Eyes:  Negative for blurred vision and pain.   Respiratory:  Positive for shortness of breath. Negative for cough.    Cardiovascular:  Negative for chest pain and palpitations.   Gastrointestinal:  Negative for  "abdominal pain, constipation, diarrhea and nausea.   Genitourinary:  Negative for dysuria and urgency.   Musculoskeletal:  Negative for back pain and myalgias.   Neurological:  Positive for weakness. Negative for dizziness and headaches.   Psychiatric/Behavioral:  Negative for depression. The patient is not nervous/anxious.      A 10+ review of systems was performed with pertinent positives and negatives noted above in the history of present illness. Other systems were negative unless otherwise specified.    OBJECTIVE:     Vital Signs (Most Recent)  Vitals:    02/28/24 1503   BP: 112/72   Pulse: 78   SpO2: 98%   Weight: 81.1 kg (178 lb 12.7 oz)   Height: 5' 4" (1.626 m)     BMI: Body mass index is 30.69 kg/m².     Physical Exam:  Physical Exam  Constitutional:       General: She is not in acute distress.  HENT:      Head: Normocephalic.      Right Ear: External ear normal.      Left Ear: External ear normal.   Eyes:      Extraocular Movements: Extraocular movements intact.   Cardiovascular:      Heart sounds: Normal heart sounds.   Pulmonary:      Effort: Pulmonary effort is normal. No respiratory distress.      Breath sounds: Normal breath sounds. No stridor. No wheezing, rhonchi or rales.   Musculoskeletal:         General: Normal range of motion.      Cervical back: Normal range of motion.   Skin:     General: Skin is warm and dry.   Neurological:      Mental Status: She is alert.   Psychiatric:         Mood and Affect: Mood normal.          ASSESSMENT:   Aretha Holt is a 78 y.o. female who presents to clinic for shortness of breath and fatigue.     1. Shortness of breath    2. Fatigue, unspecified type         PLAN:     Shortness of breath  Patient with prior clinic visit for SOB which appears unclear in etiology. PFT ordered at the time of initial evaluation earlier in the month, however, patient has not been notified or contacted to have this done or scheduled. Will get in touch with  " to have PFT facilitated.   -     Complete PFT w/ bronchodilator; Future    Fatigue, unspecified type  Patient encouraged to visit psychologist for potential underlying depressive symptoms causing fatigue.      Provided patient with anticipatory guidance and return precautions. Treatment plan discussed with patient, all questions answered, and patient acknowledged understanding and verbal agreement.      Follow-up in: 1 months; or sooner PRN if acute concerns arise.      Case discussed with Dr. JASMINE Freitas Do, MD  [facility] Medicine PGY-1

## 2024-03-12 NOTE — PROGRESS NOTES
I assume primary medical responsibility for this patient. I have reviewed the history, physical, and assessement & treatment plan with the resident and agree that the care is reasonable and necessary. This service has been performed by a resident without the presence of a teaching physician under the primary care exception. If necessary, an addendum of additional findings or evaluation beyond the resident documentation will be noted below.      Elisha Piper MD    [facility]

## 2024-03-20 ENCOUNTER — HOSPITAL ENCOUNTER (EMERGENCY)
Facility: HOSPITAL | Age: 79
Discharge: HOME | End: 2024-03-21
Attending: STUDENT IN AN ORGANIZED HEALTH CARE EDUCATION/TRAINING PROGRAM
Payer: MEDICARE

## 2024-03-20 ENCOUNTER — NURSE TRIAGE (OUTPATIENT)
Dept: ADMINISTRATIVE | Facility: CLINIC | Age: 79
End: 2024-03-20
Payer: MEDICARE

## 2024-03-20 DIAGNOSIS — R06.09 DYSPNEA ON EXERTION: Primary | ICD-10-CM

## 2024-03-20 DIAGNOSIS — R53.1 GENERALIZED WEAKNESS: ICD-10-CM

## 2024-03-20 DIAGNOSIS — R07.9 CHEST PAIN: ICD-10-CM

## 2024-03-20 LAB
ALBUMIN SERPL BCP-MCNC: 4 G/DL (ref 3.5–5.2)
ALP SERPL-CCNC: 124 U/L (ref 38–126)
ALT SERPL W/O P-5'-P-CCNC: 11 U/L (ref 10–44)
ANION GAP SERPL CALC-SCNC: 8 MMOL/L (ref 8–16)
AST SERPL-CCNC: 20 U/L (ref 15–46)
BASOPHILS # BLD AUTO: 0.02 K/UL (ref 0–0.2)
BASOPHILS NFR BLD: 0.3 % (ref 0–1.9)
BILIRUB SERPL-MCNC: 0.5 MG/DL (ref 0.1–1)
BILIRUB UR QL STRIP: NEGATIVE
CALCIUM SERPL-MCNC: 9.2 MG/DL (ref 8.7–10.5)
CHLORIDE SERPL-SCNC: 107 MMOL/L (ref 95–110)
CLARITY UR REFRACT.AUTO: CLEAR
CO2 SERPL-SCNC: 27 MMOL/L (ref 23–29)
COLOR UR AUTO: YELLOW
CREAT SERPL-MCNC: 0.93 MG/DL (ref 0.5–1.4)
DIFFERENTIAL METHOD BLD: ABNORMAL
EOSINOPHIL # BLD AUTO: 0.2 K/UL (ref 0–0.5)
EOSINOPHIL NFR BLD: 2.6 % (ref 0–8)
ERYTHROCYTE [DISTWIDTH] IN BLOOD BY AUTOMATED COUNT: 13.4 % (ref 11.5–14.5)
EST. GFR  (NO RACE VARIABLE): >60 ML/MIN/1.73 M^2
GLUCOSE SERPL-MCNC: 106 MG/DL (ref 70–110)
GLUCOSE UR QL STRIP: NEGATIVE
HCT VFR BLD AUTO: 37.3 % (ref 37–48.5)
HGB BLD-MCNC: 12.4 G/DL (ref 12–16)
HGB UR QL STRIP: NEGATIVE
IMM GRANULOCYTES # BLD AUTO: 0.01 K/UL (ref 0–0.04)
IMM GRANULOCYTES NFR BLD AUTO: 0.1 % (ref 0–0.5)
KETONES UR QL STRIP: NEGATIVE
LEUKOCYTE ESTERASE UR QL STRIP: NEGATIVE
LYMPHOCYTES # BLD AUTO: 3.2 K/UL (ref 1–4.8)
LYMPHOCYTES NFR BLD: 42.3 % (ref 18–48)
MAGNESIUM SERPL-MCNC: 2.1 MG/DL (ref 1.6–2.6)
MCH RBC QN AUTO: 30.8 PG (ref 27–31)
MCHC RBC AUTO-ENTMCNC: 33.2 G/DL (ref 32–36)
MCV RBC AUTO: 93 FL (ref 82–98)
MONOCYTES # BLD AUTO: 0.6 K/UL (ref 0.3–1)
MONOCYTES NFR BLD: 7.8 % (ref 4–15)
NEUTROPHILS # BLD AUTO: 3.5 K/UL (ref 1.8–7.7)
NEUTROPHILS NFR BLD: 46.9 % (ref 38–73)
NITRITE UR QL STRIP: NEGATIVE
NRBC BLD-RTO: 0 /100 WBC
NT-PROBNP SERPL-MCNC: 48 PG/ML (ref 5–1800)
PH UR STRIP: 6 [PH] (ref 5–8)
PLATELET # BLD AUTO: 274 K/UL (ref 150–450)
PMV BLD AUTO: 8.8 FL (ref 9.2–12.9)
POTASSIUM SERPL-SCNC: 4 MMOL/L (ref 3.5–5.1)
PROT SERPL-MCNC: 7.4 G/DL (ref 6–8.4)
PROT UR QL STRIP: NEGATIVE
RBC # BLD AUTO: 4.02 M/UL (ref 4–5.4)
SODIUM SERPL-SCNC: 142 MMOL/L (ref 136–145)
SP GR UR STRIP: 1.02 (ref 1–1.03)
TROPONIN I SERPL-MCNC: <0.012 NG/ML (ref 0.01–0.03)
TROPONIN I SERPL-MCNC: <0.012 NG/ML (ref 0.01–0.03)
URN SPEC COLLECT METH UR: NORMAL
UROBILINOGEN UR STRIP-ACNC: NEGATIVE EU/DL
UUN UR-MCNC: 10 MG/DL (ref 7–17)
WBC # BLD AUTO: 7.45 K/UL (ref 3.9–12.7)

## 2024-03-20 PROCEDURE — 99285 EMERGENCY DEPT VISIT HI MDM: CPT | Mod: 25,ER

## 2024-03-20 PROCEDURE — 83735 ASSAY OF MAGNESIUM: CPT | Mod: ER | Performed by: STUDENT IN AN ORGANIZED HEALTH CARE EDUCATION/TRAINING PROGRAM

## 2024-03-20 PROCEDURE — 93010 ELECTROCARDIOGRAM REPORT: CPT | Mod: ,,, | Performed by: INTERNAL MEDICINE

## 2024-03-20 PROCEDURE — 81003 URINALYSIS AUTO W/O SCOPE: CPT | Mod: ER | Performed by: STUDENT IN AN ORGANIZED HEALTH CARE EDUCATION/TRAINING PROGRAM

## 2024-03-20 PROCEDURE — 93005 ELECTROCARDIOGRAM TRACING: CPT | Mod: ER

## 2024-03-20 PROCEDURE — 94760 N-INVAS EAR/PLS OXIMETRY 1: CPT | Mod: ER

## 2024-03-20 PROCEDURE — 84484 ASSAY OF TROPONIN QUANT: CPT | Mod: 91,ER | Performed by: STUDENT IN AN ORGANIZED HEALTH CARE EDUCATION/TRAINING PROGRAM

## 2024-03-20 PROCEDURE — 83880 ASSAY OF NATRIURETIC PEPTIDE: CPT | Mod: ER | Performed by: STUDENT IN AN ORGANIZED HEALTH CARE EDUCATION/TRAINING PROGRAM

## 2024-03-20 PROCEDURE — 80053 COMPREHEN METABOLIC PANEL: CPT | Mod: ER | Performed by: STUDENT IN AN ORGANIZED HEALTH CARE EDUCATION/TRAINING PROGRAM

## 2024-03-20 PROCEDURE — 85025 COMPLETE CBC W/AUTO DIFF WBC: CPT | Mod: ER | Performed by: STUDENT IN AN ORGANIZED HEALTH CARE EDUCATION/TRAINING PROGRAM

## 2024-03-20 NOTE — TELEPHONE ENCOUNTER
[place]    PCP:  Dr. Sabrina Dooley    [facility]  She reports had Covid on 1/3.  C/O weakness, dizziness, and moderate SOB.  Per protocol, care advised is go to ED now.  Pt VU and states will have to wait until her Dtr gets home in an hour but will go then.  Pt refuses to call EMS.  Advised pt to go asap.  Advised to call for worsening/questions/concerns.  VU.    Reason for Disposition   [1] MODERATE difficulty breathing (e.g., speaks in phrases, SOB even at rest, pulse 100-120) AND [2] new-onset or WORSE    Additional Information   Negative: SEVERE difficulty breathing (e.g., struggling for each breath, speaks in single words)   Negative: [1] SEVERE weakness (e.g., can't stand or can barely walk) AND [2] new-onset or WORSE   Negative: Difficult to awaken or acting confused (e.g., disoriented, slurred speech)   Negative: Bluish (or gray) lips or face now   Negative: Sounds like a life-threatening emergency to the triager    Protocols used: Coronavirus (COVID-19) Persisting Symptoms Follow-up Call-A-

## 2024-03-21 VITALS
OXYGEN SATURATION: 99 % | HEIGHT: 64 IN | BODY MASS INDEX: 29.88 KG/M2 | DIASTOLIC BLOOD PRESSURE: 73 MMHG | HEART RATE: 64 BPM | TEMPERATURE: 98 F | WEIGHT: 175 LBS | RESPIRATION RATE: 16 BRPM | SYSTOLIC BLOOD PRESSURE: 161 MMHG

## 2024-03-21 LAB
OHS QRS DURATION: 80 MS
OHS QTC CALCULATION: 422 MS

## 2024-03-21 NOTE — ED PROVIDER NOTES
NAME:  Aretha Holt  CSN:     [id number]  MRN:    [id number]  ADMIT DATE: 3/20/2024        eMERGENCY dEPARTMENT eNCOUnter    CHIEF COMPLAINT    Chief Complaint   Patient presents with    Weakness     Weakness x 2 days, sob with exertion, covid + in         HPI    Aretha Holt is a 78 y.o. female with a past medical history of  has a past medical history of Arthritis of both knees (2023), Cystitis (2021), Diabetes mellitus, Gastroesophageal reflux disease (2023), and Type 2 diabetes mellitus without complication (2021).     she presents to the ED due to worsening dyspnea on exertion since having COVID in January.  Does not feel that it is improving.  She notes that today she has had intermittent left-sided chest pain described as sharp as nature.  Intermittent.  Can not say specifically when it last occurred but believes it occurred shortly prior to arrival.  Denies pain currently.  Pain was sharp and nonradiating.  Has not had pain like this previously.  States that she does feel winded and fatigued on exertion.  States that in the past she was told this was secondary to her COVID infection that she had in January.  She states she was told to follow up outpatient to get pulmonary function test, however, these have not yet been done and she states nobody has contacted her to schedule this.  Notes history of diabetes, diet denies history of hypertension, hyperlipidemia previous heart attack in the past.  No fevers.  Denies any cough sputum production.    HPI       PAST MEDICAL HISTORY  Past Medical History:   Diagnosis Date    Arthritis of both knees 2023    Cystitis 2021    Diabetes mellitus     Gastroesophageal reflux disease 2023    Type 2 diabetes mellitus without complication 2021       SURGICAL HISTORY    Past Surgical History:   Procedure Laterality Date     SECTION      CHOLECYSTECTOMY      CHOLECYSTECTOMY         FAMILY HISTORY    Family History  "  Problem Relation Age of Onset    Diabetes Mother     No Known Problems Father        SOCIAL HISTORY    Social History     Socioeconomic History    Marital status:    Tobacco Use    Smoking status: Former     Current packs/day: 0.00     Types: Cigarettes     Quit date: 6/10/2014     Years since quittin.7    Smokeless tobacco: Never   Substance and Sexual Activity    Alcohol use: No    Drug use: No    Sexual activity: Not Currently       MEDICATIONS  Current Outpatient Medications   Medication Instructions    acetaminophen (TYLENOL) 500 mg, Oral, Every 6 hours PRN    clobetasoL (TEMOVATE) 0.05 % cream Topical (Top), 2 times daily    clobetasol 0.05% (TEMOVATE) 0.05 % Oint Topical (Top), 2 times daily    diclofenac sodium (VOLTAREN) 2 g, Topical (Top), 4 times daily PRN, For muscular pain    furosemide (LASIX) 20 mg, Oral, Daily PRN    metFORMIN (GLUCOPHAGE) 500 mg, Oral, 2 times daily with meals    pantoprazole (PROTONIX) 40 mg, Oral, Daily       ALLERGIES    Review of patient's allergies indicates:   Allergen Reactions    Decadron [dexamethasone] Other (See Comments)     Syncope (causes spike in blood glucose with previous bout of syncope)    Celestone [betamethasone sodium phosphate]     Celestone [betamethasone] Other (See Comments)     Syncope (causes spike in blood glucose with previous bout of syncope)    Decadron-la          REVIEW OF SYSTEMS   Review of Systems       PHYSICAL EXAM    Reviewed Triage Note    VITAL SIGNS:   ED Triage Vitals [24 1855]   Enc Vitals Group      BP (!) 148/72      Pulse 75      Resp 18      Temp 98.6 °F (37 °C)      Temp src       SpO2 100 %      Weight 175 lb      Height 5' 4"      Head Circumference       Peak Flow       Pain Score       Pain Loc       Pain Edu?       Excl. in GC?        Patient Vitals for the past 24 hrs:   BP Temp Temp src Pulse Resp SpO2 Height Weight   24 0013 (!) 161/73 98.1 °F (36.7 °C) Oral 64 16 99 % -- --   24 -- -- -- " "-- 18 -- -- --   03/20/24 2106 (!) 173/74 -- -- -- -- -- -- --   03/20/24 2040 -- -- -- 63 -- -- -- --   03/20/24 1932 -- -- -- 63 20 -- -- --   03/20/24 1855 (!) 148/72 98.6 °F (37 °C) -- 75 18 100 % 5' 4" (1.626 m) 79.4 kg (175 lb)       Physical Exam    Nursing note and vitals reviewed.  Constitutional: She appears well-developed and well-nourished.   HENT:   Head: Normocephalic and atraumatic.   Eyes: EOM are normal. Pupils are equal, round, and reactive to light.   Neck: Neck supple.   Normal range of motion.  Cardiovascular:  Normal rate, regular rhythm and normal heart sounds.           Pulmonary/Chest: Breath sounds normal. No respiratory distress.   Abdominal: Abdomen is soft. There is no abdominal tenderness.   Musculoskeletal:         General: No edema. Normal range of motion.      Cervical back: Normal range of motion and neck supple.     Neurological: She is alert and oriented to person, place, and time.   Skin: Skin is warm and dry.   Psychiatric: She has a normal mood and affect.          EKG     Interpreted by EM physician if performed:   See below            LABS  Pertinent labs reviewed. (See chart for details)   Labs Reviewed   CBC W/ AUTO DIFFERENTIAL - Abnormal; Notable for the following components:       Result Value    MPV 8.8 (*)     All other components within normal limits   COMPREHENSIVE METABOLIC PANEL   URINALYSIS, REFLEX TO URINE CULTURE    Narrative:     Preferred Collection Type->Urine, Clean Catch  Specimen Source->Urine   MAGNESIUM   NT-PRO NATRIURETIC PEPTIDE   TROPONIN I   TROPONIN I   TROPONIN I         RADIOLOGY          Imaging Results              X-Ray Chest PA And Lateral (Final result)  Result time 03/20/24 21:35:59      Final result by Rosario Steve MD (03/20/24 21:35:59)                   Impression:      No active or adverse finding      Electronically signed by: Rosario Steve  Date:    03/20/2024  Time:    21:35               Narrative:    EXAMINATION:  XR " CHEST PA AND LATERAL    CLINICAL HISTORY:  Chest pain, unspecified    TECHNIQUE:  PA and lateral views of the chest were performed.    COMPARISON:  January 2024    FINDINGS:  Lungs well aerated unchanged.  No pleural effusion or convincing pneumothorax.  Mediastinal contour stable                                        PROCEDURES    Procedures      ED COURSE & MEDICAL DECISION MAKING    Pertinent Labs & Imaging studies reviewed. (See chart for details and specific orders.)          Summary of review of records:   Nuclear stress test done in August of 2022 without any evidence of ischemia at that time.  EF was 83 at rest and 76 post stress test.  She did have an echo in March of 2023 with normal systolic and diastolic function at time.    Medical Decision Making  Amount and/or Complexity of Data Reviewed  Labs: ordered. Decision-making details documented in ED Course.  Radiology: ordered. Decision-making details documented in ED Course.  ECG/medicine tests:  Decision-making details documented in ED Course.      Aretha Holt is a 78 y.o. female worsening dyspnea on exertion, generalized fatigue and intermittent sharp pain to the left chest over the last week.  States she did have an episode shortly prior to arrival but is not able to elaborate further.    Differential includes but is not limited to pneumonia, ACS, clinically low suspicion for PE given reassuring CTA of the chest in January this year.      Additional MDM:   Heart Score:    History:          Slightly suspicious.  ECG:             Normal  Age:               >65 years  Risk factors: 1-2 risk factors  Troponin:       Less than or equal to normal limit  Heart Score = 3           Medications - No data to display    ED Course as of 03/21/24 0226   Wed Mar 20, 2024   1942 EKG 12-lead  Independently interpreted by me.  Sinus rhythm at a rate of 60.  No ST elevation or depression noted.  Normal intervals.  No evidence of ischemia. [HL]   1953 CBC auto  differential(!)  No acute abnormalities  [HL]   2009 Comprehensive metabolic panel  within normal limits  [HL]   2016 NT-proBNP: 48  within normal limits  [HL]   2017 Magnesium : 2.1  within normal limits  [HL]   2034 Troponin I: <0.012  Undetectable. Will repeat given episodic nature and reported sharp L chest pain shortly prior to arrival.  [HL]   2229 X-Ray Chest PA And Lateral  No active or adverse finding [HL]   2314 Troponin I: <0.012 [HL]   2314 Urinalysis, Reflex to Urine Culture Urine, Clean Catch  No e/o infection [HL]      ED Course User Index  [HL] Flores Rojas DO     Patient remained asymptomatic during her time in the emergency department.  Delta troponin was negative.  Referral placed to Cardiology and encouraged to follow up with primary care closely.  Did discuss contacting them in order to determine how to set up PFT testing that they ordered previously.  Reasons to return discussed and all questions addressed.        FINAL IMPRESSION    Final diagnoses:  [R53.1] Generalized weakness  [R07.9] Chest pain  [R06.09] Dyspnea on exertion (Primary)       DISPOSITION  Patient discharge in stable condition        ED Prescriptions    None       Follow-up Information       Follow up With Specialties Details Why Contact Info    Sabrina Dooley MD Family Medicine In 2 days  [address]  [facility] [address] [phone]      Robert Gamble MD Cardiology, Interventional Cardiology Schedule an appointment as soon as possible for a visit   [address]  [address]  [facility] [id number] [phone]      [facility] - Emergency Dept Emergency Medicine  As needed, If symptoms worsen [address] [facility] [facility] [address] [phone]              DISCLAIMER: This note was prepared with [employer] voice recognition transcription software. Garbled syntax, mangled pronouns, and other bizarre constructions may be attributed to that software system.             Flores Rojas  ANA PAULA,   03/21/24 0227

## 2024-03-26 ENCOUNTER — OFFICE VISIT (OUTPATIENT)
Dept: CARDIOLOGY | Facility: CLINIC | Age: 79
End: 2024-03-26
Payer: MEDICARE

## 2024-03-26 VITALS
HEIGHT: 64 IN | WEIGHT: 180 LBS | DIASTOLIC BLOOD PRESSURE: 74 MMHG | SYSTOLIC BLOOD PRESSURE: 127 MMHG | HEART RATE: 82 BPM | BODY MASS INDEX: 30.73 KG/M2 | OXYGEN SATURATION: 96 %

## 2024-03-26 DIAGNOSIS — R07.9 CHEST PAIN: ICD-10-CM

## 2024-03-26 DIAGNOSIS — R53.1 WEAKNESS: ICD-10-CM

## 2024-03-26 DIAGNOSIS — R55 NEAR SYNCOPE: ICD-10-CM

## 2024-03-26 DIAGNOSIS — R07.9 CHEST PAIN OF UNCERTAIN ETIOLOGY: Primary | ICD-10-CM

## 2024-03-26 DIAGNOSIS — K21.9 GASTROESOPHAGEAL REFLUX DISEASE, UNSPECIFIED WHETHER ESOPHAGITIS PRESENT: ICD-10-CM

## 2024-03-26 DIAGNOSIS — R07.2 PRECORDIAL PAIN: ICD-10-CM

## 2024-03-26 DIAGNOSIS — E11.9 TYPE 2 DIABETES MELLITUS WITHOUT COMPLICATION, WITHOUT LONG-TERM CURRENT USE OF INSULIN: ICD-10-CM

## 2024-03-26 PROCEDURE — 99999 PR PBB SHADOW E&M-EST. PATIENT-LVL III: CPT | Mod: PBBFAC,,, | Performed by: INTERNAL MEDICINE

## 2024-03-26 PROCEDURE — 99214 OFFICE O/P EST MOD 30 MIN: CPT | Mod: S$GLB,,, | Performed by: INTERNAL MEDICINE

## 2024-03-26 RX ORDER — PANTOPRAZOLE SODIUM 40 MG/1
40 TABLET, DELAYED RELEASE ORAL DAILY
Qty: 30 TABLET | Refills: 5 | Status: SHIPPED | OUTPATIENT
Start: 2024-03-26 | End: 2024-09-22

## 2024-03-26 NOTE — PROGRESS NOTES
"  Subjective:      Patient ID: Aretha Holt is a 78 y.o. female.    Chief Complaint: Shortness of Breath (LOV 10/2023 w/Mavis) and Chest Pain    HPI: Pt had covid in January.    Pt felt weak for months afterwards.    Pt felt weak and fell to the floor 1/3/24 at home and went to the hospital and was diagnosed with Covid.    Pt cooks at home.    Pt occasionally goes shopping.    Pt does light housework.    Review of Systems   Cardiovascular:  Positive for chest pain (Pt had "light chest pains" last week.), dyspnea on exertion (Sometiomes when cooking) and near-syncope (Sometimes while cooking). Negative for claudication, irregular heartbeat, leg swelling (No swelling of legs over the past 3 months.  Pt has not taken any furosemide for months), orthopnea, palpitations and syncope (Last fainted (without LOC) 1/3/24 when dx wiht Covid).      Pt has frequent weak spells and has to go lie down to feel better.    Pt felt discomfort in the chest last week when worried about paying bills.  The pain was anterior chest in location and lasted 5 minutes.     Pt has had chest pains in the past which were felt to be non-cardiac in etiology    Pt went to the ER 3/20/24 with shortness of breath and weakness and mild chest pains    Pt sleeps well at night.    Pt encouraged to record blood pressure and heart rate when feeling weak.  Past Medical History:   Diagnosis Date    Arthritis of both knees 2023    Cystitis 2021    Diabetes mellitus     Gastroesophageal reflux disease 2023    Type 2 diabetes mellitus without complication 2021        Past Surgical History:   Procedure Laterality Date     SECTION      CHOLECYSTECTOMY      CHOLECYSTECTOMY         Family History   Problem Relation Age of Onset    Diabetes Mother     No Known Problems Father        Social History     Socioeconomic History    Marital status:    Tobacco Use    Smoking status: Former     Current packs/day: 0.00     Types: " "Cigarettes     Quit date: 6/10/2014     Years since quittin.8    Smokeless tobacco: Never   Substance and Sexual Activity    Alcohol use: No    Drug use: No    Sexual activity: Not Currently       Current Outpatient Medications on File Prior to Visit   Medication Sig Dispense Refill    acetaminophen (TYLENOL) 500 MG tablet Take 500 mg by mouth every 6 (six) hours as needed for Pain.      clobetasoL (TEMOVATE) 0.05 % cream Apply topically 2 (two) times daily.      clobetasol 0.05% (TEMOVATE) 0.05 % Oint Apply topically 2 (two) times daily. 60 g 5    diclofenac sodium (VOLTAREN) 1 % Gel Apply 2 g topically 4 (four) times daily as needed (pain). For muscular pain 100 g 1    furosemide (LASIX) 20 MG tablet Take 1 tablet (20 mg total) by mouth daily as needed (edema, swelling, fluid). 90 tablet 1    metFORMIN (GLUCOPHAGE) 500 MG tablet TAKE 1 TABLET BY MOUTH TWICE A DAY WITH MEALS 180 tablet 1    [DISCONTINUED] pantoprazole (PROTONIX) 40 MG tablet Take 1 tablet (40 mg total) by mouth once daily. 30 tablet 5     No current facility-administered medications on file prior to visit.       Review of patient's allergies indicates:   Allergen Reactions    Decadron [dexamethasone] Other (See Comments)     Syncope (causes spike in blood glucose with previous bout of syncope)    Celestone [betamethasone sodium phosphate]     Celestone [betamethasone] Other (See Comments)     Syncope (causes spike in blood glucose with previous bout of syncope)    Decadron-la      Objective:     Vitals:    24 1415   BP: 127/74   BP Location: Left arm   Patient Position: Sitting   BP Method: Large (Automatic)   Pulse: 82   SpO2: 96%   Weight: 81.7 kg (180 lb 0.4 oz)   Height: 5' 4" (1.626 m)        Physical Exam  Constitutional:       Appearance: She is well-developed.   Eyes:      General: No scleral icterus.  Neck:      Vascular: No carotid bruit or JVD.   Cardiovascular:      Rate and Rhythm: Normal rate and regular rhythm.      Heart " sounds: No murmur heard.     No gallop.   Pulmonary:      Breath sounds: Normal breath sounds.   Musculoskeletal:      Right lower leg: No edema.      Left lower leg: No edema.   Skin:     General: Skin is warm and dry.   Neurological:      Mental Status: She is alert and oriented to person, place, and time.   Psychiatric:         Behavior: Behavior normal.         Thought Content: Thought content normal.         Judgment: Judgment normal.                ECG 3/20/24: NSR, WNL      Admission on 03/20/2024, Discharged on 03/21/2024   Component Date Value Ref Range Status    QRS Duration 03/20/2024 80  ms Final    [facility] QTC Calculation 03/20/2024 422  ms Final    WBC 03/20/2024 7.45  3.90 - 12.70 K/uL Final    RBC 03/20/2024 4.02  4.00 - 5.40 M/uL Final    Hemoglobin 03/20/2024 12.4  12.0 - 16.0 g/dL Final    Hematocrit 03/20/2024 37.3  37.0 - 48.5 % Final    MCV 03/20/2024 93  82 - 98 fL Final    MCH 03/20/2024 30.8  27.0 - 31.0 pg Final    MCHC 03/20/2024 33.2  32.0 - 36.0 g/dL Final    RDW 03/20/2024 13.4  11.5 - 14.5 % Final    Platelets 03/20/2024 274  150 - 450 K/uL Final    MPV 03/20/2024 8.8 (L)  9.2 - 12.9 fL Final    Immature Granulocytes 03/20/2024 0.1  0.0 - 0.5 % Final    Gran # (ANC) 03/20/2024 3.5  1.8 - 7.7 K/uL Final    Immature Grans (Abs) 03/20/2024 0.01  0.00 - 0.04 K/uL Final    Lymph # 03/20/2024 3.2  1.0 - 4.8 K/uL Final    Mono # 03/20/2024 0.6  0.3 - 1.0 K/uL Final    Eos # 03/20/2024 0.2  0.0 - 0.5 K/uL Final    Baso # 03/20/2024 0.02  0.00 - 0.20 K/uL Final    nRBC 03/20/2024 0  0 /100 WBC Final    Gran % 03/20/2024 46.9  38.0 - 73.0 % Final    Lymph % 03/20/2024 42.3  18.0 - 48.0 % Final    Mono % 03/20/2024 7.8  4.0 - 15.0 % Final    Eosinophil % 03/20/2024 2.6  0.0 - 8.0 % Final    Basophil % 03/20/2024 0.3  0.0 - 1.9 % Final    Differential Method 03/20/2024 Automated   Final    Sodium 03/20/2024 142  136 - 145 mmol/L Final    Potassium 03/20/2024 4.0  3.5 - 5.1 mmol/L Final    Chloride  03/20/2024 107  95 - 110 mmol/L Final    CO2 03/20/2024 27  23 - 29 mmol/L Final    Glucose 03/20/2024 106  70 - 110 mg/dL Final    BUN 03/20/2024 10  7 - 17 mg/dL Final    Creatinine 03/20/2024 0.93  0.50 - 1.40 mg/dL Final    Calcium 03/20/2024 9.2  8.7 - 10.5 mg/dL Final    Total Protein 03/20/2024 7.4  6.0 - 8.4 g/dL Final    Albumin 03/20/2024 4.0  3.5 - 5.2 g/dL Final    Total Bilirubin 03/20/2024 0.5  0.1 - 1.0 mg/dL Final    Alkaline Phosphatase 03/20/2024 124  38 - 126 U/L Final    AST 03/20/2024 20  15 - 46 U/L Final    ALT 03/20/2024 11  10 - 44 U/L Final    Anion Gap 03/20/2024 8  8 - 16 mmol/L Final    eGFR 03/20/2024 >60.0  >60 mL/min/1.73 m^2 Final    Specimen UA 03/20/2024 Urine, Clean Catch   Final    Color, UA 03/20/2024 Yellow  Yellow, Straw, Zeina Final    Appearance, UA 03/20/2024 Clear  Clear Final    pH, UA 03/20/2024 6.0  5.0 - 8.0 Final    Specific Gravity, UA 03/20/2024 1.025  1.005 - 1.030 Final    Protein, UA 03/20/2024 Negative  Negative Final    Glucose, UA 03/20/2024 Negative  Negative Final    Ketones, UA 03/20/2024 Negative  Negative Final    Bilirubin (UA) 03/20/2024 Negative  Negative Final    Occult Blood UA 03/20/2024 Negative  Negative Final    Nitrite, UA 03/20/2024 Negative  Negative Final    Urobilinogen, UA 03/20/2024 Negative  <2.0 EU/dL Final    Leukocytes, UA 03/20/2024 Negative  Negative Final    Magnesium 03/20/2024 2.1  1.6 - 2.6 mg/dL Final    NT-proBNP 03/20/2024 48  5 - 1800 pg/mL Final    Troponin I 03/20/2024 <0.012  0.012 - 0.034 ng/mL Final    Troponin I 03/20/2024 <0.012  0.012 - 0.034 ng/mL Final   Admission on 01/13/2024, Discharged on 01/13/2024   Component Date Value Ref Range Status    WBC 01/13/2024 9.02  3.90 - 12.70 K/uL Final    RBC 01/13/2024 4.28  4.00 - 5.40 M/uL Final    Hemoglobin 01/13/2024 12.9  12.0 - 16.0 g/dL Final    Hematocrit 01/13/2024 39.5  37.0 - 48.5 % Final    MCV 01/13/2024 92  82 - 98 fL Final    MCH 01/13/2024 30.1  27.0 - 31.0 pg  Final    MCHC 01/13/2024 32.7  32.0 - 36.0 g/dL Final    RDW 01/13/2024 13.3  11.5 - 14.5 % Final    Platelets 01/13/2024 349  150 - 450 K/uL Final    MPV 01/13/2024 9.1 (L)  9.2 - 12.9 fL Final    Immature Granulocytes 01/13/2024 0.3  0.0 - 0.5 % Final    Gran # (ANC) 01/13/2024 4.6  1.8 - 7.7 K/uL Final    Immature Grans (Abs) 01/13/2024 0.03  0.00 - 0.04 K/uL Final    Lymph # 01/13/2024 3.5  1.0 - 4.8 K/uL Final    Mono # 01/13/2024 0.7  0.3 - 1.0 K/uL Final    Eos # 01/13/2024 0.2  0.0 - 0.5 K/uL Final    Baso # 01/13/2024 0.03  0.00 - 0.20 K/uL Final    nRBC 01/13/2024 0  0 /100 WBC Final    Gran % 01/13/2024 50.8  38.0 - 73.0 % Final    Lymph % 01/13/2024 38.9  18.0 - 48.0 % Final    Mono % 01/13/2024 7.5  4.0 - 15.0 % Final    Eosinophil % 01/13/2024 2.2  0.0 - 8.0 % Final    Basophil % 01/13/2024 0.3  0.0 - 1.9 % Final    Differential Method 01/13/2024 Automated   Final    Sodium 01/13/2024 142  136 - 145 mmol/L Final    Potassium 01/13/2024 3.9  3.5 - 5.1 mmol/L Final    Chloride 01/13/2024 107  95 - 110 mmol/L Final    CO2 01/13/2024 25  23 - 29 mmol/L Final    Glucose 01/13/2024 121 (H)  70 - 110 mg/dL Final    BUN 01/13/2024 15  7 - 17 mg/dL Final    Creatinine 01/13/2024 0.99  0.50 - 1.40 mg/dL Final    Calcium 01/13/2024 9.2  8.7 - 10.5 mg/dL Final    Total Protein 01/13/2024 7.7  6.0 - 8.4 g/dL Final    Albumin 01/13/2024 4.2  3.5 - 5.2 g/dL Final    Total Bilirubin 01/13/2024 0.2  0.1 - 1.0 mg/dL Final    Alkaline Phosphatase 01/13/2024 117  38 - 126 U/L Final    AST 01/13/2024 24  15 - 46 U/L Final    ALT 01/13/2024 17  10 - 44 U/L Final    Anion Gap 01/13/2024 10  8 - 16 mmol/L Final    eGFR 01/13/2024 58.4 (A)  >60 mL/min/1.73 m^2 Final    Troponin I 01/13/2024 0.014  0.012 - 0.034 ng/mL Final   Admission on 01/05/2024, Discharged on 01/05/2024   Component Date Value Ref Range Status    WBC 01/05/2024 8.63  3.90 - 12.70 K/uL Final    RBC 01/05/2024 4.41  4.00 - 5.40 M/uL Final    Hemoglobin  01/05/2024 13.5  12.0 - 16.0 g/dL Final    Hematocrit 01/05/2024 40.6  37.0 - 48.5 % Final    MCV 01/05/2024 92  82 - 98 fL Final    MCH 01/05/2024 30.6  27.0 - 31.0 pg Final    MCHC 01/05/2024 33.3  32.0 - 36.0 g/dL Final    RDW 01/05/2024 13.7  11.5 - 14.5 % Final    Platelets 01/05/2024 263  150 - 450 K/uL Final    MPV 01/05/2024 9.1 (L)  9.2 - 12.9 fL Final    Immature Granulocytes 01/05/2024 0.3  0.0 - 0.5 % Final    Gran # (ANC) 01/05/2024 5.3  1.8 - 7.7 K/uL Final    Immature Grans (Abs) 01/05/2024 0.03  0.00 - 0.04 K/uL Final    Lymph # 01/05/2024 2.4  1.0 - 4.8 K/uL Final    Mono # 01/05/2024 0.7  0.3 - 1.0 K/uL Final    Eos # 01/05/2024 0.3  0.0 - 0.5 K/uL Final    Baso # 01/05/2024 0.02  0.00 - 0.20 K/uL Final    nRBC 01/05/2024 0  0 /100 WBC Final    Gran % 01/05/2024 61.0  38.0 - 73.0 % Final    Lymph % 01/05/2024 27.8  18.0 - 48.0 % Final    Mono % 01/05/2024 7.6  4.0 - 15.0 % Final    Eosinophil % 01/05/2024 3.1  0.0 - 8.0 % Final    Basophil % 01/05/2024 0.2  0.0 - 1.9 % Final    Differential Method 01/05/2024 Automated   Final    Sodium 01/05/2024 140  136 - 145 mmol/L Final    Potassium 01/05/2024 4.2  3.5 - 5.1 mmol/L Final    Chloride 01/05/2024 105  95 - 110 mmol/L Final    CO2 01/05/2024 22 (L)  23 - 29 mmol/L Final    Glucose 01/05/2024 158 (H)  70 - 110 mg/dL Final    BUN 01/05/2024 10  8 - 23 mg/dL Final    Creatinine 01/05/2024 1.0  0.5 - 1.4 mg/dL Final    Calcium 01/05/2024 9.3  8.7 - 10.5 mg/dL Final    Total Protein 01/05/2024 7.8  6.0 - 8.4 g/dL Final    Albumin 01/05/2024 3.9  3.5 - 5.2 g/dL Final    Total Bilirubin 01/05/2024 0.4  0.1 - 1.0 mg/dL Final    Alkaline Phosphatase 01/05/2024 127  55 - 135 U/L Final    AST 01/05/2024 22  10 - 40 U/L Final    ALT 01/05/2024 16  10 - 44 U/L Final    eGFR 01/05/2024 58 (A)  >60 mL/min/1.73 m^2 Final    Anion Gap 01/05/2024 13  8 - 16 mmol/L Final    BNP 01/05/2024 30  0 - 99 pg/mL Final    Troponin I 01/05/2024 <0.006  0.000 - 0.026 ng/mL  Final    Influenza A, Molecular 01/05/2024 Negative  Negative Final    Influenza B, Molecular 01/05/2024 Negative  Negative Final    Flu A & B Source 01/05/2024 Nasal swab   Final    SARS-CoV-2 RNA, Amplification, Qual 01/05/2024 Positive (A)  Negative Final   Admission on 10/05/2023, Discharged on 10/05/2023   Component Date Value Ref Range Status    WBC 10/05/2023 7.57  3.90 - 12.70 K/uL Final    RBC 10/05/2023 4.22  4.00 - 5.40 M/uL Final    Hemoglobin 10/05/2023 12.7  12.0 - 16.0 g/dL Final    Hematocrit 10/05/2023 38.7  37.0 - 48.5 % Final    MCV 10/05/2023 92  82 - 98 fL Final    MCH 10/05/2023 30.1  27.0 - 31.0 pg Final    MCHC 10/05/2023 32.8  32.0 - 36.0 g/dL Final    RDW 10/05/2023 13.5  11.5 - 14.5 % Final    Platelets 10/05/2023 262  150 - 450 K/uL Final    MPV 10/05/2023 8.6 (L)  9.2 - 12.9 fL Final    Immature Granulocytes 10/05/2023 0.3  0.0 - 0.5 % Final    Gran # (ANC) 10/05/2023 3.9  1.8 - 7.7 K/uL Final    Immature Grans (Abs) 10/05/2023 0.02  0.00 - 0.04 K/uL Final    Lymph # 10/05/2023 2.8  1.0 - 4.8 K/uL Final    Mono # 10/05/2023 0.7  0.3 - 1.0 K/uL Final    Eos # 10/05/2023 0.1  0.0 - 0.5 K/uL Final    Baso # 10/05/2023 0.02  0.00 - 0.20 K/uL Final    nRBC 10/05/2023 0  0 /100 WBC Final    Gran % 10/05/2023 51.9  38.0 - 73.0 % Final    Lymph % 10/05/2023 37.1  18.0 - 48.0 % Final    Mono % 10/05/2023 9.1  4.0 - 15.0 % Final    Eosinophil % 10/05/2023 1.3  0.0 - 8.0 % Final    Basophil % 10/05/2023 0.3  0.0 - 1.9 % Final    Differential Method 10/05/2023 Automated   Final    Sodium 10/05/2023 141  136 - 145 mmol/L Final    Potassium 10/05/2023 4.3  3.5 - 5.1 mmol/L Final    Chloride 10/05/2023 106  95 - 110 mmol/L Final    CO2 10/05/2023 25  23 - 29 mmol/L Final    Glucose 10/05/2023 86  70 - 110 mg/dL Final    BUN 10/05/2023 14  8 - 23 mg/dL Final    Creatinine 10/05/2023 1.0  0.5 - 1.4 mg/dL Final    Calcium 10/05/2023 9.3  8.7 - 10.5 mg/dL Final    Total Protein 10/05/2023 7.2  6.0 - 8.4  g/dL Final    Albumin 10/05/2023 3.8  3.5 - 5.2 g/dL Final    Total Bilirubin 10/05/2023 0.3  0.1 - 1.0 mg/dL Final    Alkaline Phosphatase 10/05/2023 118  55 - 135 U/L Final    AST 10/05/2023 16  10 - 40 U/L Final    ALT 10/05/2023 11  10 - 44 U/L Final    eGFR 10/05/2023 58 (A)  >60 mL/min/1.73 m^2 Final    Anion Gap 10/05/2023 10  8 - 16 mmol/L Final    Troponin I 10/05/2023 <0.006  0.000 - 0.026 ng/mL Final    BNP 10/05/2023 16  0 - 99 pg/mL Final    D-Dimer 10/05/2023 0.66 (H)  <0.50 mg/L FEU Final    TSH 10/05/2023 0.695  0.400 - 4.000 uIU/mL Final    Specimen UA 10/05/2023 Urine, Clean Catch   Final    Color, UA 10/05/2023 Yellow  Yellow, Straw, Zeina Final    Appearance, UA 10/05/2023 Clear  Clear Final    pH, UA 10/05/2023 6.0  5.0 - 8.0 Final    Specific Gravity, UA 10/05/2023 1.030  1.005 - 1.030 Final    Protein, UA 10/05/2023 Trace (A)  Negative Final    Glucose, UA 10/05/2023 Negative  Negative Final    Ketones, UA 10/05/2023 Negative  Negative Final    Bilirubin (UA) 10/05/2023 Negative  Negative Final    Occult Blood UA 10/05/2023 Negative  Negative Final    Nitrite, UA 10/05/2023 Negative  Negative Final    Urobilinogen, UA 10/05/2023 Negative  <2.0 EU/dL Final    Leukocytes, UA 10/05/2023 1+ (A)  Negative Final    Troponin I 10/05/2023 0.017  0.000 - 0.026 ng/mL Final    RBC, UA 10/05/2023 0  0 - 4 /hpf Final    WBC, UA 10/05/2023 1  0 - 5 /hpf Final    Bacteria 10/05/2023 Rare  None-Occ /hpf Final    Squam Epithel, UA 10/05/2023 2  /hpf Final    Microscopic Comment 10/05/2023 SEE COMMENT   Final   (Accession #: [id number]  Regadenoson stress test with myocardial perfusion SPECT (multi study)    Height:  Not recorded   Weight:  Not recorded   Blood Pressure:  Not recorded    Date of Study:  10/3/22   Ordering Provider:  Joe Pierre MD   Clinical Indications:  Mitral valve insufficiency, unspecified etiology [I34.0 (ICD-10-CM)], Anxiety [F41.9 (ICD-10-CM)], Prediabetes [R73.03 (ICD-10-CM)], BMI  30.0-30.9,adult [Z68.30 (ICD-10-CM)], Gastroesophageal reflux disease, unspecified whether esophagitis present [K21.9 (ICD-10-CM)], BMI 29.0-29.9,adult [Z68.29 (ICD-10-CM)], Other chest pain [R07.89 (ICD-10-CM)]       Interpreting Physicians  Performing Staff   Héctor Novoa MD Tech:  Jovana Douglas RT        Reason for Exam  Priority: Routine  Dx: Mitral valve insufficiency, unspecified etiology [I34.0 (ICD-10-CM)]; Anxiety [F41.9 (ICD-10-CM)]; Prediabetes [R73.03 (ICD-10-CM)]; BMI 30.0-30.9,adult [Z68.30 (ICD-10-CM)]; Gastroesophageal reflux disease, unspecified whether esophagitis present [K21.9 (ICD-10-CM)]; BMI 29.0-29.9,adult [Z68.29 (ICD-10-CM)]; Other chest pain [R07.89 (ICD-10-CM)]     Conclusion         Normal myocardial perfusion scan. There is no evidence of myocardial ischemia or infarction.    The gated perfusion images showed an ejection fraction of 83% at rest. The gated perfusion images showed an ejection fraction of 76% post stress.    The EKG portion of this study is negative for ischemia.    The patient reported no chest pain during the stress test.    There were no arrhythmias during stress.     Performing Clinician    Jovana Douglas RT   Accession #: [id number]  Dobutamine Stress Test w/ Color Flow    Height:  Not recorded   Weight:  Not recorded   Blood Pressure:  Not recorded    Date of Study:  05/22/2017   Ordering Provider:  Pawan Orellana MD   Clinical Indications:  Syncope, unspecified syncope type [R55 (ICD-10-CM)]       Interpreting Physicians  Performing Staff   Result is not signed. No performing staff assigned to study.     Reason for Exam  Priority: Routine  Dx: Syncope, unspecified syncope type [R55 (ICD-10-CM)]     Dobutamine Stress Test w/ Color Flow  Order: [id number]  Status: Final result     Visible to patient: Yes (not seen)     Next appt: 04/01/2024 at 11:15 AM in Podiatry (Morelia Oliva DPM)     Dx: Syncope, unspecified syncope type     0 Result Notes       Component Ref Range &  Units 6 yr ago   EF + QEF 55 - 65 65    Mitral Valve Regurgitation  MILD    Diastolic Dysfunction  No    Est. PA Systolic Pressure  21.15    Tricuspid Valve Regurgitation  TRIVIAL TO MILD    Resulting Agency  [facility]              Narrative  Performed by: [facility]  Date of Procedure: 05/22/2017     PRE-TEST DATA   EKG: Resting electrocardiogram reveals sinus rhythm at a rate of 68 bpm.     TEST DESCRIPTION   The patient received a graduated infusion of Dobutamine beginning at 10.00 mcg/Kg/min to a peak dose of 30 mcg/Kg/min, achieving a peak heart rate of 133 bpm, which is 93% of the age predicted maximum heart rate. .     EKG Conclusions:     1. The EKG portion of this study is suspicious for ischemia at a peak heart rate of 133 bpm (93% of predicted).   2. Blood pressure remained stable throughout the protocol  (Presenting BP: 135/64 Peak BP: 170/59).   3. No significant arrhythmias were present.   4. There were no symptoms of chest discomfort or significant dyspnea throughout the protocol.     Echocardiographic Description:   Technical Quality: This is a technically adequate study.     Aorta: The aortic root is normal in size, measuring 3.0 cm at sinotubular junction.     Left Atrium: The left atrial volume index is mildly enlarged, measuring 36.29 cc/m2.     Left Ventricle: The left ventricle is normal in size, with an end-diastolic diameter of 4.0 cm, and an end-systolic diameter of 2.7 cm. LV wall thickness is normal, with the septum and the posterior wall each measuring 0.9 cm across. Relative wall   thickness was increased at 0.45, and the LV mass index was 65.3 g/m2 consistent with concentric remodeling. There are no regional wall motion abnormalities. Left ventricular systolic function appears normal. Visually estimated ejection fraction is   60-65%. The LV Doppler derived stroke volume equals 100.0 ccs.     Diastolic indices: E wave velocity 0.9 m/s, E/A ratio 1.4,  msec., E/e' ratio(lat) 9. Diastolic  function is normal.     Right Atrium: The right atrium is normal in size, measuring 3.9 cm in length in the apical view.     Right Ventricle: The right ventricle is normal in size measuring 2.9 cm at the base in the apical right ventricle-focused view. Global right ventricular systolic function appears normal. The estimated PA systolic pressure is 21 mmHg.     Aortic Valve:  Aortic valve is normal in structure with normal leaflet mobility.     Mitral Valve:  Mitral valve is normal in structure with normal leaflet mobility. The pressure half time is 75 msec. The calculated mitral valve area is 2.93 cm2. There is mild mitral regurgitation. There is mitral annular calcification.     Tricuspid Valve:  Tricuspid valve is normal in structure with normal leaflet mobility. There is trivial to mild tricuspid regurgitation.     Pulmonary Valve:  Pulmonary valve is normal in structure with normal leaflet mobility.     IVC: IVC is normal in size and collapses > 50% with a sniff, suggesting normal right atrial pressure of 3 mmHg.     Intracavitary: There is no evidence of pericardial effusion, intracavity mass, thrombi, or vegetation.     Peak Imaging:     Images taken at peak infusion showed left ventricular function augmenting.     No dobutamine induced wall motion abnormalities were identified.       CONCLUSIONS     1 - Mild left atrial enlargement.     2 - Concentric remodeling.     3 - No wall motion abnormalities.     4 - Normal left ventricular systolic function (EF 60-65%).     5 - Normal left ventricular diastolic function.     6 - Normal right ventricular systolic function .     7 - The estimated PA systolic pressure is 21 mmHg.     8 - Mild mitral regurgitation.     9 - Trivial to mild tricuspid regurgitation.     10 - Negative dobutamine stress echocardiogram for ischemia .     No evidence of stress induced myocardial ischemia.         This document has been electronically    SIGNED BY: Robert Gamble MD On:  05/22/2017 13:55   This result has not been signed. Information might be incomplete.      Specimen Collected: 05/22/17 12:53 CDT           CTA Chest Non-Coronary (PE Studies)  Status: Final result     MyChart Results Release    MyChart Status: Active  Results Release     PACS Images for [employer] Viewer     Show images for CTA Chest Non-Coronary (PE Studies)  CTA Chest Non-Coronary (PE Studies)  Order: [id number]  Status: Final result     Visible to patient: Yes (not seen)     Next appt: 04/01/2024 at 11:15 AM in Podiatry (Morelia Oliva DPM)     0 Result Notes  Details    Reading Physician Reading Date Result Priority   Rosario Steve MD  [phone] 1/13/2024 STAT     Narrative & Impression  EXAMINATION:  CTA CHEST NON CORONARY (PE STUDIES)     CLINICAL HISTORY:  Pulmonary embolism (PE) suspected, unknown D-dimer;     TECHNIQUE:  Angiographic technique PE protocol with MIPS and post processing volumetric     Iterative technique with low-dose parameters for diminishing radiation dose as reasonably achievable     COMPARISON:  Radiographic correlation and prior CT     FINDINGS:  No pulmonary embolus seen.  No pleural effusion     Bilateral atelectasis inferior thorax.     Hiatal hernia     Impression:     Negative for PE        Electronically signed by: Rosario Steve  Date:                                            01/13/2024  Time:                                           21:40           Exam Ended: 01/13/24 21:08 CST Last Resulted: 01/13/24 21:40 CST       Order Details     View Encounter     Lab and Collection Details     Routing     Result History              X-Ray Chest PA And Lateral  Status: Final result     MyChart Results Release    [facility] Status: Active  Results Release     PACS Images for [employer] Viewer     Show images for X-Ray Chest PA And Lateral  X-Ray Chest PA And Lateral  Order: [id number]  Status: Final result     Visible to patient: Yes (not seen)     Next appt:  "04/01/2024 at 11:15 AM in Podiatry (Morelia Oliva DPM)     Dx: Chest pain     0 Result Notes  Details    Reading Physician Reading Date Result Priority   Rosario Steve MD  [phone] 3/20/2024 STAT     Narrative & Impression  EXAMINATION:  XR CHEST PA AND LATERAL     CLINICAL HISTORY:  Chest pain, unspecified     TECHNIQUE:  PA and lateral views of the chest were performed.     COMPARISON:  January 2024     FINDINGS:  Lungs well aerated unchanged.  No pleural effusion or convincing pneumothorax.  Mediastinal contour stable     Impression:     No active or adverse finding        Electronically signed by: Rosario Steve  Date:                                            03/20/2024  Time:                                           21:35           Exam Ended: 03/20/24 20:44 CDT Last Resulted: 03/20/24 21:35 CDT               Accession #: [id number]  CV Ultrasound doppler venous legs bilat    Height:  5' 4" (1.626 m)   Weight:  82.1 kg (181 lb)   Blood Pressure:  Not recorded    Date of Study:  3/16/23   Ordering Provider:  Joe Pierre MD   Clinical Indications:  Bilateral edema of lower extremity [R60.0 (ICD-10-CM)]       Interpreting Physicians  Performing Staff   Olayinka Rodriguez MD Tech:  Carmita Molina        Reason for Exam  Priority: Routine  Dx: Bilateral edema of lower extremity [R60.0 (ICD-10-CM)]     Conclusion    There is no evidence of a right lower extremity DVT.  There is no evidence of a left lower extremity DVT.     Performing Clinician    Carmita Molina       Accession #: [id number]  Transthoracic echo (TTE) complete    Height:  5' 4" (1.626 m)   Weight:  79.4 kg (175 lb)   Blood Pressure:  124/72    Date of Study:  2/18/22   Ordering Provider:  Joe Pierre MD   Clinical Indications:  Mitral valve insufficiency, unspecified etiology [I34.0 (ICD-10-CM)], Anxiety [F41.9 (ICD-10-CM)], Bilateral edema of lower extremity [R60.0 (ICD-10-CM)], Prediabetes [R73.03 (ICD-10-CM)]       Interpreting " Physicians  Performing Staff   Freddy Granados MD Tech:  Jessenia Bar, RCS, RVT        Reason for Exam  Priority: Routine  Dx: Mitral valve insufficiency, unspecified etiology [I34.0 (ICD-10-CM)]; Anxiety [F41.9 (ICD-10-CM)]; Bilateral edema of lower extremity [R60.0 (ICD-10-CM)]; Prediabetes [R73.03 (ICD-10-CM)]     Result Image Hyperlink     Show images for Echo  Summary    Concentric remodeling and normal systolic function.  The estimated ejection fraction is 60%.  Normal left ventricular diastolic function.  With normal right ventricular systolic function.  Mild mitral regurgitation.     Vitals        Assessment:     1. Chest pain of uncertain etiology    2. Chest pain    3. Gastroesophageal reflux disease, unspecified whether esophagitis present    4. Type 2 diabetes mellitus without complication, without long-term current use of insulin    5. Weakness    6. Near syncope    7. Precordial pain      Plan:   Aretha was seen today for shortness of breath and chest pain.    Diagnoses and all orders for this visit:    Chest pain of uncertain etiology  -     Nuclear Stress Test; Future  -     Holter monitor - 48 hour; Future    Chest pain  -     Ambulatory referral/consult to Cardiology  -     Nuclear Stress Test; Future  -     Holter monitor - 48 hour; Future    Gastroesophageal reflux disease, unspecified whether esophagitis present  -     pantoprazole (PROTONIX) 40 MG tablet; Take 1 tablet (40 mg total) by mouth once daily.  -     Nuclear Stress Test; Future  -     Holter monitor - 48 hour; Future    Type 2 diabetes mellitus without complication, without long-term current use of insulin  -     Nuclear Stress Test; Future  -     Holter monitor - 48 hour; Future    Weakness  -     Nuclear Stress Test; Future  -     Holter monitor - 48 hour; Future    Near syncope  -     Nuclear Stress Test; Future  -     Holter monitor - 48 hour; Future    Precordial pain  -     NM Myocardial Perfusion Spect Multi  Pharmacologic; Future  -     Nuclear Stress Test; Future  -     Holter monitor - 48 hour; Future    Is uncertain what is causing pt to feel weak at times.  It is unclear why pt feels short of breath.  The mild, non-exertional chest pains are not typical of anggina.    Some of pt's symptoms may be due to lingering effects of Covid    Will obtain a holter monitor    Pt is scheduled for a PFT test    Repeat Lexiscan Cardiolite stress test.    Pt advised to take the pantoprazole daily for the next 6 weeks    Tylenol prn    Follow up in about 3 months (around 6/26/2024).

## 2024-04-01 ENCOUNTER — TELEPHONE (OUTPATIENT)
Dept: PODIATRY | Facility: CLINIC | Age: 79
End: 2024-04-01
Payer: MEDICARE

## 2024-04-01 ENCOUNTER — OFFICE VISIT (OUTPATIENT)
Dept: PODIATRY | Facility: CLINIC | Age: 79
End: 2024-04-01
Payer: MEDICARE

## 2024-04-01 VITALS — BODY MASS INDEX: 30.9 KG/M2 | HEIGHT: 64 IN

## 2024-04-01 DIAGNOSIS — M21.619 BUNION: ICD-10-CM

## 2024-04-01 DIAGNOSIS — E11.9 ENCOUNTER FOR DIABETIC FOOT EXAM: Primary | ICD-10-CM

## 2024-04-01 DIAGNOSIS — L60.9 DISEASE OF NAIL: ICD-10-CM

## 2024-04-01 DIAGNOSIS — E11.42 DIABETIC POLYNEUROPATHY ASSOCIATED WITH TYPE 2 DIABETES MELLITUS: ICD-10-CM

## 2024-04-01 PROCEDURE — 99213 OFFICE O/P EST LOW 20 MIN: CPT | Mod: 25,S$GLB,, | Performed by: STUDENT IN AN ORGANIZED HEALTH CARE EDUCATION/TRAINING PROGRAM

## 2024-04-01 PROCEDURE — 11721 DEBRIDE NAIL 6 OR MORE: CPT | Mod: Q9,S$GLB,, | Performed by: STUDENT IN AN ORGANIZED HEALTH CARE EDUCATION/TRAINING PROGRAM

## 2024-04-01 PROCEDURE — 99999 PR PBB SHADOW E&M-EST. PATIENT-LVL III: CPT | Mod: PBBFAC,,, | Performed by: STUDENT IN AN ORGANIZED HEALTH CARE EDUCATION/TRAINING PROGRAM

## 2024-04-01 PROCEDURE — 1126F AMNT PAIN NOTED NONE PRSNT: CPT | Mod: CPTII,S$GLB,, | Performed by: STUDENT IN AN ORGANIZED HEALTH CARE EDUCATION/TRAINING PROGRAM

## 2024-04-01 PROCEDURE — 1159F MED LIST DOCD IN RCRD: CPT | Mod: CPTII,S$GLB,, | Performed by: STUDENT IN AN ORGANIZED HEALTH CARE EDUCATION/TRAINING PROGRAM

## 2024-04-01 NOTE — PROGRESS NOTES
Subjective:     Patient ID: Aretha Holt is a 78 y.o. female.    Chief Complaint: Diabetes Mellitus (PCP Dr. London, 2/28/24), Diabetic Foot Exam, and Routine Foot Care    Aretha is a 78 y.o. female who presents to the clinic upon referral from Dr. Thomas  for evaluation and treatment of diabetic feet. Aretha has a past medical history of Arthritis of both knees (2/27/2023), Cystitis (6/13/2021), Diabetes mellitus, Gastroesophageal reflux disease (2/27/2023), and Type 2 diabetes mellitus without complication (6/13/2021). Patient relates no major problem with feet. Only complaints today consist of here for diabetic foot exam.    PCP: Sabrina Dooley MD    Date Last Seen by PCP: per above    Current shoe gear: Casual shoes    Hemoglobin A1C   Date Value Ref Range Status   09/01/2023 6.3 (H) 4.0 - 5.6 % Final     Comment:     ADA Screening Guidelines:  5.7-6.4%  Consistent with prediabetes  >or=6.5%  Consistent with diabetes    High levels of fetal hemoglobin interfere with the HbA1C  assay. Heterozygous hemoglobin variants (HbS, HgC, etc)do  not significantly interfere with this assay.   However, presence of multiple variants may affect accuracy.     08/05/2022 5.9 (H) 4.0 - 5.6 % Final     Comment:     ADA Screening Guidelines:  5.7-6.4%  Consistent with prediabetes  >or=6.5%  Consistent with diabetes    High levels of fetal hemoglobin interfere with the HbA1C  assay. Heterozygous hemoglobin variants (HbS, HgC, etc)do  not significantly interfere with this assay.   However, presence of multiple variants may affect accuracy.     02/04/2022 6.3 (H) 4.0 - 5.6 % Final     Comment:     ADA Screening Guidelines:  5.7-6.4%  Consistent with prediabetes  >or=6.5%  Consistent with diabetes    High levels of fetal hemoglobin interfere with the HbA1C  assay. Heterozygous hemoglobin variants (HbS, HgC, etc)do  not significantly interfere with this assay.   However, presence of multiple variants may affect accuracy.            Review of Systems   Constitutional: Negative for chills, decreased appetite, diaphoresis and fever.   HENT:  Negative for congestion and hearing loss.    Cardiovascular:  Negative for chest pain, claudication, leg swelling and syncope.   Respiratory:  Negative for cough and shortness of breath.    Skin:  Positive for dry skin and nail changes. Negative for color change, flushing, itching, poor wound healing and rash.   Musculoskeletal:  Positive for arthritis and joint pain. Negative for joint swelling.   Gastrointestinal:  Negative for nausea and vomiting.   Neurological:  Positive for numbness and paresthesias. Negative for focal weakness and weakness.   Psychiatric/Behavioral:  Negative for altered mental status. The patient is not nervous/anxious.         Objective:     Physical Exam  Constitutional:       General: She is not in acute distress.     Appearance: She is well-developed. She is not diaphoretic.   Cardiovascular:      Comments: Dorsalis pedis and posterior tibial pulses are within normal limits. Skin temperature is within normal limits. Toes are cool to touch and feet are warm proximally. Hair growth is within normal limits. Skin is normotrophic and without hyperpigmentation. No edema noted. No spider veins or varicosities noted, bilaterally.       Musculoskeletal:         General: No tenderness.      Comments: Adequate joint range of motion without pain, limitation, nor crepitation to bilateral feet and ankle joints. Muscle strength is 5/5 in all groups bilaterally.  HAV deformity, bilaterally      Lymphadenopathy:      Comments: Negative lymphangitic streaking    Skin:     General: Skin is warm and dry.      Findings: No lesion.      Comments: Skin is warm and dry, no acute signs of infection noted. No open wounds, macerations or hyperkeratotic lesions, bilaterally.     Toenails are thickened by 2-4 mm's, dystrophic, and are darkened in coloration with subungual fungal debris, bilaterally.   Skin is very dry, bilaterally.      Neurological:      Mental Status: She is alert and oriented to person, place, and time.      Sensory: Sensory deficit present.      Motor: No abnormal muscle tone.      Comments: Light touch within normal limits.Westfall-Pritesh 5.07 monofilamant testing is diminished. Vibratory sensation  is diminished, bilaterally    Psychiatric:         Behavior: Behavior normal.         Thought Content: Thought content normal.         Judgment: Judgment normal.           Assessment:      Encounter Diagnoses   Name Primary?    Bunion Yes    Diabetic polyneuropathy associated with type 2 diabetes mellitus     Disease of nail      Plan:     Aretha was seen today for diabetes mellitus, diabetic foot exam and routine foot care.    Diagnoses and all orders for this visit:    Bunion  -     DIABETIC SHOES FOR HOME USE    Diabetic polyneuropathy associated with type 2 diabetes mellitus  -     DIABETIC SHOES FOR HOME USE    Disease of nail  -     DIABETIC SHOES FOR HOME USE      I counseled the patient on her conditions, their implications and medical management.  Routine foot care per attached note  Shoe inspection. Diabetic Foot Education. Patient reminded of the importance of good nutrition and blood sugar control to help prevent podiatric complications of diabetes. Patient instructed on proper foot hygeine. We discussed wearing proper shoe gear, daily foot inspections, never walking without protective shoe gear, never putting sharp instruments to feet, routine podiatric nail visits    Return to clinic in 1 year, sooner PRN

## 2024-04-01 NOTE — TELEPHONE ENCOUNTER
----- Message from Sheila Mcfarlane sent at 4/1/2024 11:28 AM CDT -----  Type:  Patient Returning Call    Who Called:pt   Who Left Message for Patient:  Does the patient know what this is regarding?:late pt  Would the patient rather a call back or a response via [facility]? call  Best Call Back Number:[phone]  Additional Information: states she is 3 mins away and would like to be seen

## 2024-04-02 NOTE — PROCEDURES
"Routine Foot Care    Date/Time: 4/1/2024 11:15 AM    Performed by: Morelia Oliva DPM  Authorized by: Morelia Oliva DPM    Time out: Immediately prior to procedure a "time out" was called to verify the correct patient, procedure, equipment, support staff and site/side marked as required.    Consent Done?:  Yes (Verbal)  Hyperkeratotic Skin Lesions?: No      Nail Care Type:  Debride  Location(s): All  (Left 1st Toe, Left 3rd Toe, Left 2nd Toe, Left 4th Toe, Left 5th Toe, Right 1st Toe, Right 2nd Toe, Right 3rd Toe, Right 4th Toe and Right 5th Toe)  Patient tolerance:  Patient tolerated the procedure well with no immediate complications    "

## 2024-04-05 ENCOUNTER — HOSPITAL ENCOUNTER (OUTPATIENT)
Dept: PULMONOLOGY | Facility: HOSPITAL | Age: 79
Discharge: HOME | End: 2024-04-05
Attending: STUDENT IN AN ORGANIZED HEALTH CARE EDUCATION/TRAINING PROGRAM
Payer: MEDICARE

## 2024-04-05 DIAGNOSIS — R07.9 CHEST PAIN: ICD-10-CM

## 2024-04-05 PROCEDURE — 94010 BREATHING CAPACITY TEST: CPT

## 2024-04-16 LAB
BRPFT: NORMAL
FEF 25 75 LLN: 0.47
FEF 25 75 PRE REF: 69.1 %
FEF 25 75 REF: 1.41
FEV1 FVC LLN: 63
FEV1 FVC PRE REF: 96.6 %
FEV1 FVC REF: 78
FEV1 LLN: 1.14
FEV1 PRE REF: 73.4 %
FEV1 REF: 1.7
FVC LLN: 1.51
FVC PRE REF: 75.2 %
FVC REF: 2.22
PEF LLN: 2.13
PEF PRE REF: 126.2 %
PEF REF: 4.15
PRE FEF 25 75: 0.97 L/S (ref 0.47–2.34)
PRE FET 100: 7.34 SEC
PRE FEV1 FVC: 74.87 % (ref 63.43–91.6)
PRE FEV1: 1.25 L (ref 1.14–2.26)
PRE FVC: 1.67 L (ref 1.51–2.93)
PRE PEF: 5.24 L/S (ref 2.13–6.17)

## 2024-04-16 PROCEDURE — 94010 BREATHING CAPACITY TEST: CPT | Mod: 26,,, | Performed by: INTERNAL MEDICINE

## 2024-04-24 ENCOUNTER — TELEPHONE (OUTPATIENT)
Dept: PODIATRY | Facility: CLINIC | Age: 79
End: 2024-04-24
Payer: MEDICARE

## 2024-04-24 NOTE — TELEPHONE ENCOUNTER
----- Message from Nancy Martinez sent at 4/24/2024  1:41 PM CDT -----  Type:  Needs Medical Advice    Who Called: Rebeca with [facility]   Would the patient rather a call back or a response via [facility]? fax  Best Call Back Number: [phone]  Additional Information: please refax order or script

## 2024-04-24 NOTE — TELEPHONE ENCOUNTER
After Visit Summary   10/8/2018    Lolis Villarreal    MRN: [id number]           Patient Information     Date Of Birth          1951        Visit Information        Provider Department      10/8/2018 9:20 AM Sidra Bernardo MD [facility] [place]        Today's Diagnoses     Routine history and physical examination of adult    -  1    Internal hemorrhoids        Asymptomatic postmenopausal status        Need for hepatitis C screening test        Need for prophylactic vaccination and inoculation against influenza        Need for Tdap vaccination        Screening for diabetes mellitus        Encounter for screening for HIV        Screening cholesterol level          Care Instructions      Preventive Health Recommendations  Female Ages 65 +    Yearly exam:     See your health care provider every year in order to  o Review health changes.   o Discuss preventive care.    o Review your medicines if your doctor has prescribed any.      You no longer need a yearly Pap test unless you've had an abnormal Pap test in the past 10 years. If you have vaginal symptoms, such as bleeding or discharge, be sure to talk with your provider about a Pap test.      Every 1 to 2 years, have a mammogram.  If you are over 69, talk with your health care provider about whether or not you want to continue having screening mammograms.      Every 10 years, have a colonoscopy. Or, have a yearly FIT test (stool test). These exams will check for colon cancer.       Have a cholesterol test every 5 years, or more often if your doctor advises it.       Have a diabetes test (fasting glucose) every three years. If you are at risk for diabetes, you should have this test more often.       At age 65, have a bone density scan (DEXA) to check for osteoporosis (brittle bone disease).    Shots:    Get a flu shot each year.    Get a tetanus shot every 10 years.    Talk to your doctor about your pneumonia vaccines. There are now two  Per [facility] request faxed/routed the RX for diabetic shoes over to the store   you should receive - Pneumovax (PPSV 23) and Prevnar (PCV 13).    Talk to your pharmacist about the shingles vaccine.    Talk to your doctor about the hepatitis B vaccine.    Nutrition:     Eat at least 5 servings of fruits and vegetables each day.      Eat whole-grain bread, whole-wheat pasta and brown rice instead of white grains and rice.      Get adequate about Calcium and Vitamin D.     Lifestyle    Exercise at least 150 minutes a week (30 minutes a day, 5 days a week). This will help you control your weight and prevent disease.      Limit alcohol to one drink per day.      No smoking.       Wear sunscreen to prevent skin cancer.       See your dentist twice a year for an exam and cleaning.      See your eye doctor every 1 to 2 years to screen for conditions such as glaucoma, macular degeneration, cataracts, etc     --------------------  1. Vaccines today: flu shot, tetanus  2. Labs today: cholesterol, diabetes screen, liver function, kidney functio, thyroid function, Hep C screening and HIV screening  3. Schedule bone density scan downstairs  4. Schedule with GI to discuss hemorrhoids          Follow-ups after your visit        Additional Services     GASTROENTEROLOGY ADULT REF CONSULT ONLY       Preferred Location: [facility] [phone]      Please be aware that coverage of these services is subject to the terms and limitations of your health insurance plan.  Call member services at your health plan with any benefit or coverage questions.  Any procedures must be performed at a [facility] facility OR coordinated by your clinic's referral office.    Please bring the following with you to your appointment:    (1) Any X-Rays, CTs or MRIs which have been performed.  Contact the facility where they were done to arrange for  prior to your scheduled appointment.    (2) List of current medications   (3) This referral request   (4) Any documents/labs given to you for this referral                  Follow-up notes  from your care team     Return in about 1 year (around 10/8/2019).      Your next 10 appointments already scheduled     Oct 08, 2018 10:15 AM CDT   MA SCREENING DIGITAL BILATERAL with EAMA1   [facility] ([facility])    [address] ,[address]  [place] [address] [phone]           How do I prepare for my exam? (Food and drink instructions) No Food and Drink Restrictions.  How do I prepare for my exam? (Other instructions) Do not use any powder, lotion or deodorant under your arms or on your breast. If you do, we will ask you to remove it before your exam.  What should I wear: Wear comfortable, two-piece clothing.  How long does the exam take: Most scans will take 15 minutes.  What should I bring: Bring any previous mammograms from other facilities or have them mailed to the breast center.  Do I need a :  No  is needed.  What do I need to tell my doctor: If you have any allergies, tell your care team.  What should I do after the exam: No restrictions, You may resume normal activities.  What is this test: This test is an x-ray of the breast to look for breast disease. The breast is pressed between two plates to flatten and spread the tissue. An X-ray is taken of the breast from different angles.  Who should I call with questions: If you have any questions, please call the Imaging Department where you will have your exam. Directions, parking instructions, and other information is available on our website, [email or web address].  Other information about my exam Three-dimensional (3D) mammograms are available at [facility] locations in [facility], [facility], [facility], [place], [facility] and [place]. [facility] locations include [place] and the Clinics and Surgery Center in [place].  Benefits of 3D mammograms include * Improved rate of cancer detection * Decreases your chance of having to go back for more tests, which means fewer: *  "\"False-positive\" results (This means that there is an abnormal area but it isn't cancer.) * Invasive testing procedures, such as a biopsy or surgery * Can provide clearer images of the breast if you have dense breast tissue.  *3D mammography is an optional exam that anyone can have with a 2D mammogram. It doesn't replace or take the place of a 2D mammogram. 2D mammograms remain an effective screening test for all women.  Not all insurance companies cover the cost of a 3D mammogram. Check with your insurance. Three-dimensional (3D) mammograms are available at [facility] locations in [facility], [facility], and [place]. [facility] locations include [place] and [facility] in [place]. Benefits of 3D mammograms include: - Improved rate of cancer detection - Decreases your chance of having to go back for more tests, which means fewer: - \"False-positive\" results (This means that there is an abnormal area but it isn't cancer.) - Invasive testing procedures, such as a biopsy or surgery - Can provide clearer images of the breast if you have dense breast tissue. 3D mammography is an optional exam that anyone can have with a 2D mammogram. It doesn't replace or take the place of a 2D mammogram. 2D mammograms remain an effective screening test for all women.  Not all insurance companies cover the cost of a 3D mammogram. Check with your insurance.            Sep 23, 2019  9:30 AM CDT   Return Sleep Patient with Chele Umanzor MD   [facility] ([facility])    [address]  [facility] [place] [address]   [phone]              Future tests that were ordered for you today     Open Future Orders        Priority Expected Expires Ordered    DEXA HIP/PELVIS/SPINE - Future Routine  10/8/2019 10/8/2018            Who to contact     If you have questions or need follow up information about today's clinic " "visit or your schedule please contact [facility] directly at [phone].  Normal or non-critical lab and imaging results will be communicated to you by MyChart, letter or phone within 4 business days after the clinic has received the results. If you do not hear from us within 7 days, please contact the clinic through [facility] or phone. If you have a critical or abnormal lab result, we will notify you by phone as soon as possible.  Submit refill requests through [facility] or call your pharmacy and they will forward the refill request to us. Please allow 3 business days for your refill to be completed.          Additional Information About Your Visit        [facility] Information     [facility] gives you secure access to your electronic health record. If you see a primary care provider, you can also send messages to your care team and make appointments. If you have questions, please call your primary care clinic.  If you do not have a primary care provider, please call [phone] and they will assist you.        Care EveryWhere ID     This is your Care EveryWhere ID. This could be used by other organizations to access your [facility] medical records  [id number]        Your Vitals Were     Pulse Temperature Height Pulse Oximetry BMI (Body Mass Index)       80 98.6  F (37  C) (Tympanic) 5' 2.25\" (1.581 m) 99% 31.37 kg/m2        Blood Pressure from Last 3 Encounters:   10/08/18 140/70   09/19/18 128/71   04/11/18 128/72    Weight from Last 3 Encounters:   10/08/18 172 lb 14.4 oz (78.4 kg)   09/19/18 170 lb 6.4 oz (77.3 kg)   04/11/18 175 lb 6.4 oz (79.6 kg)              We Performed the Following          ADMIN VACCINE, FIRST [93738]     CBC with platelets     Comprehensive metabolic panel     GASTROENTEROLOGY ADULT REF CONSULT ONLY     HC FLU VAC PRESRV FREE QUAD SPLIT VIR 3+YRS IM  [14505]     Hepatitis C Screen Reflex to HCV RNA Quant and Genotype     HIV Antigen Antibody Combo     Lipid panel reflex to direct " LDL Fasting     TDAP VACCINE (ADACEL)     TSH with free T4 reflex        Primary Care Provider Office Phone # Fax #    Manuela Sepulveda [phone]       [address] [facility] [address]  [facility] [address]        Equal Access to Services     ERENDIRAHA LUCIA : Hadii aad ku hadasho Soomaali, waaxda luqadaha, qaybta kaalmada adeegyada, waxay idiin hayaan adeeg khkoreysh laedelmira . So [facility] [phone].    ATENCIÓN: Si habla español, tiene a vuong disposición servicios gratuitos de asistencia lingüística. Llame al [phone].    We comply with applicable federal civil rights laws and [place] laws. We do not discriminate on the basis of race, color, national origin, age, disability, sex, sexual orientation, or gender identity.            Thank you!     Thank you for choosing [facility]  for your care. Our goal is always to provide you with excellent care. Hearing back from our patients is one way we can continue to improve our services. Please take a few minutes to complete the written survey that you may receive in the mail after your visit with us. Thank you!             Your Updated Medication List - Protect others around you: Learn how to safely use, store and throw away your medicines at www.disposemymeds.org.          This list is accurate as of 10/8/18 10:07 AM.  Always use your most recent med list.                   Brand Name Dispense Instructions for use Diagnosis    cetirizine 10 MG tablet    zyrTEC    30 tablet    Take 1 tablet (10 mg) by mouth every evening        CINNAMON PO      Take 1,200 mg by mouth 2 times daily        CoQ10 50 MG Caps           Krill Oil 1000 MG Caps           Misc Intestinal Lesa Regulat Tabs      Take  by mouth.    Routine general medical examination at a health care facility       Multi-vitamin Tabs tablet      Take 1 tablet by mouth daily        order for DME      Uses C-pap.        Turmeric 500 MG Caps           UNABLE TO FIND      MEDICATION NAME:  Cognifen        vitamin B complex with vitamin C Tabs tablet      Take 1 tablet by mouth daily        Vitamin D (Ergocalciferol) 2000 units Caps

## 2024-05-06 ENCOUNTER — HOSPITAL ENCOUNTER (EMERGENCY)
Facility: HOSPITAL | Age: 79
Discharge: HOME | End: 2024-05-06
Attending: EMERGENCY MEDICINE
Payer: MEDICARE

## 2024-05-06 VITALS
OXYGEN SATURATION: 96 % | DIASTOLIC BLOOD PRESSURE: 60 MMHG | HEIGHT: 64 IN | HEART RATE: 95 BPM | TEMPERATURE: 99 F | SYSTOLIC BLOOD PRESSURE: 122 MMHG | RESPIRATION RATE: 19 BRPM | BODY MASS INDEX: 29.02 KG/M2 | WEIGHT: 170 LBS

## 2024-05-06 DIAGNOSIS — M25.551 RIGHT HIP PAIN: ICD-10-CM

## 2024-05-06 PROCEDURE — 99284 EMERGENCY DEPT VISIT MOD MDM: CPT | Mod: 25,ER

## 2024-05-06 PROCEDURE — 25000003 PHARM REV CODE 250: Mod: ER

## 2024-05-06 RX ORDER — LIDOCAINE 50 MG/G
1 PATCH TOPICAL
Status: DISCONTINUED | OUTPATIENT
Start: 2024-05-06 | End: 2024-05-06 | Stop reason: HOSPADM

## 2024-05-06 RX ORDER — LIDOCAINE 50 MG/G
1 PATCH TOPICAL DAILY
Qty: 7 PATCH | Refills: 0 | Status: SHIPPED | OUTPATIENT
Start: 2024-05-06 | End: 2024-05-23

## 2024-05-06 RX ORDER — NAPROXEN 250 MG/1
500 TABLET ORAL
Status: COMPLETED | OUTPATIENT
Start: 2024-05-06 | End: 2024-05-06

## 2024-05-06 RX ORDER — MELOXICAM 7.5 MG/1
7.5 TABLET ORAL DAILY
Qty: 14 TABLET | Refills: 0 | Status: SHIPPED | OUTPATIENT
Start: 2024-05-06 | End: 2024-05-20

## 2024-05-06 RX ADMIN — LIDOCAINE 1 PATCH: 50 PATCH CUTANEOUS at 04:05

## 2024-05-06 RX ADMIN — NAPROXEN 500 MG: 250 TABLET ORAL at 04:05

## 2024-05-06 NOTE — ED PROVIDER NOTES
Encounter Date: 2024       History     Chief Complaint   Patient presents with    Hip Pain     Pt C/O R hip pain X 4 days.  Pt denies injury.  Unable to assess D/T pt clothing.      Aretha Holt is a 78 y.o. female who has a past medical history of Arthritis of both knees, Cystitis, Diabetes mellitus, Gastroesophageal reflux disease, and Type 2 diabetes mellitus without complication. presenting to the Emergency Department for right hip pain.    She reports pain started 4 days ago to lateral aspect of right hip and buttock.  Pain does not radiate.  It is worse with weight-bearing and with sit to stand.  She has tried Tylenol with mild improvement. She denies any injury or trauma.  There is no paresthesias, numbness, saddle anesthesia. No radiculopathy. No urinary symptoms.  No fevers.        The history is provided by the patient.     Review of patient's allergies indicates:   Allergen Reactions    Decadron [dexamethasone] Other (See Comments)     Syncope (causes spike in blood glucose with previous bout of syncope)    Celestone [betamethasone sodium phosphate]     Celestone [betamethasone] Other (See Comments)     Syncope (causes spike in blood glucose with previous bout of syncope)    Decadron-la      Past Medical History:   Diagnosis Date    Arthritis of both knees 2023    Cystitis 2021    Diabetes mellitus     Gastroesophageal reflux disease 2023    Type 2 diabetes mellitus without complication 2021     Past Surgical History:   Procedure Laterality Date     SECTION      CHOLECYSTECTOMY      CHOLECYSTECTOMY       Family History   Problem Relation Name Age of Onset    Diabetes Mother      No Known Problems Father       Social History     Tobacco Use    Smoking status: Former     Current packs/day: 0.00     Types: Cigarettes     Quit date: 6/10/2014     Years since quittin.9    Smokeless tobacco: Never   Substance Use Topics    Alcohol use: No    Drug use: No     Review of  Systems   Constitutional:  Negative for chills and fever.   Musculoskeletal:  Positive for arthralgias (right hip).   Skin:  Negative for color change.   Psychiatric/Behavioral:  Negative for agitation and confusion.        Physical Exam     Initial Vitals [05/06/24 1526]   BP Pulse Resp Temp SpO2   122/60 95 19 98.7 °F (37.1 °C) 96 %      MAP       --         Physical Exam    Nursing note and vitals reviewed.  Constitutional: She appears well-developed and well-nourished. She is not diaphoretic.  Non-toxic appearance. No distress.   HENT:   Head: Normocephalic and atraumatic.   Right Ear: Hearing and external ear normal.   Left Ear: Hearing and external ear normal.   Eyes: EOM are normal.   Neck: Neck supple.   Normal range of motion.  Cardiovascular:  Normal rate.           Pulmonary/Chest: No respiratory distress.   Abdominal: She exhibits no distension.   Musculoskeletal:         General: Normal range of motion.      Cervical back: Normal range of motion and neck supple. Normal range of motion.        Legs:       Comments: No midline spinal tenderness. Tenderness to right SI joint, iliac crest and glute. No tenderness to GT or IT band. 5/5 strength knee flexion/extension, EHL/FHL. 2+ DP pulses. Compartments soft and compressible. No edema or calf tenderness. Negative SLR.      Neurological: She is alert and oriented to person, place, and time. GCS score is 15. GCS eye subscore is 4. GCS verbal subscore is 5. GCS motor subscore is 6.   Skin: Skin is warm and dry.   Psychiatric: She has a normal mood and affect. Her behavior is normal. Judgment and thought content normal.         ED Course   Procedures  Labs Reviewed - No data to display       Imaging Results              X-Ray Hip 2 or 3 views Right (with Pelvis when performed) (Final result)  Result time 05/06/24 15:44:36      Final result by Alexx Alva MD (05/06/24 15:44:36)                   Impression:      No acute fracture or  dislocation.      Electronically signed by: Alexx Alva MD  Date:    05/06/2024  Time:    15:44               Narrative:    EXAMINATION:  XR HIP WITH PELVIS WHEN PERFORMED, 2 OR 3  VIEWS RIGHT    CLINICAL HISTORY:  XR HIP WITH PELVIS WHEN PERFORMED, 2 OR 3  VIEWS RIGHTPain in right hip    COMPARISON:  06/12/2021    FINDINGS:  Three views of the right hip were obtained.    No evidence of acute fracture or dislocation.  Bony mineralization is normal.  Soft tissues are unremarkable.   Moderate degenerative changes lower lumbar spine.  Mild degenerative changes bilateral superior acetabulum.                                       Medications   naproxen tablet 500 mg (500 mg Oral Given 5/6/24 1644)     Medical Decision Making  77 yo female with non traumatic right hip pain for four days. Unrelieved with tylenol. NVI. Ambulatory with stable gait. Tenderness to SI joint iliac crest and glute. Will obtain XR. Creatinine 0.9 on 3/20/24, will administer dose of naproxen.     Differential Diagnosis includes, but is not limited to:  Fracture, stress fracture, bursitis, SI joint dysfunction, icy band syndrome, sacroiliitis, piriformis syndrome, hip impingement, DVT, lumbar radiculopathy, septic joint, Fracture, dislocation, compartment syndrome    XR negative for fracture. No symptoms concerning for DVT, UTI, pyelonephritis, nephrolithiasis, cauda equina, AAA, epidural abscess, osteomyelitis. She is neurologically intact. Will provide short course of NSAID therapy with meloxicam. Physical therapy and orthopedic referral. ED return precautions discussed.     Problems Addressed:  Right hip pain: acute illness or injury    Amount and/or Complexity of Data Reviewed  Radiology: ordered. Decision-making details documented in ED Course.  Discussion of management or test interpretation with external provider(s): none    Risk  Prescription drug management.  Risk Details: Comorbidities taken into consideration during the patient's  evaluation and treatment include arthritis, diabetes, GERD  Social determinants of health taken into consideration during development of our treatment plan include difficulty in obtaining follow-up and obtaining medications; health literacy                 ED Course as of 05/07/24 1154   Mon May 06, 2024   1602 X-Ray Hip 2 or 3 views Right (with Pelvis when performed)  No acute fracture or dislocation. [CS]      ED Course User Index  [CS] Carola Black PA-C                           Clinical Impression:  Final diagnoses:  [M25.551] Right hip pain          ED Disposition Condition    Discharge Stable          ED Prescriptions       Medication Sig Dispense Start Date End Date Auth. Provider    meloxicam (MOBIC) 7.5 MG tablet Take 1 tablet (7.5 mg total) by mouth once daily. for 14 days 14 tablet 5/6/2024 5/20/2024 Carola Black PA-C    LIDOcaine (LIDODERM) 5 % Place 1 patch onto the skin once daily. Remove & Discard patch within 12 hours or as directed by MD 7 patch 5/6/2024 -- Carola Black PA-C          Follow-up Information       Follow up With Specialties Details Why Contact Info    Sabrina Dooley MD Family Medicine Schedule an appointment as soon as possible for a visit in 2 days  [address] [address] [phone]               Carola Black PA-C  05/07/24 [id number]

## 2024-05-06 NOTE — DISCHARGE INSTRUCTIONS
You can take meloxicam daily to decrease inflammation and pain. This is a short term medication for pain. You can also take 1000 mg of tylenol every 8 hours for pain.     I have placed a referral for you to follow up with the orthopedic doctor. I have also placed a referral for physical therapy.

## 2024-05-22 ENCOUNTER — TELEPHONE (OUTPATIENT)
Dept: FAMILY MEDICINE | Facility: HOSPITAL | Age: 79
End: 2024-05-22
Payer: MEDICARE

## 2024-05-22 DIAGNOSIS — R53.81 PHYSICAL DECONDITIONING: Primary | ICD-10-CM

## 2024-05-22 NOTE — TELEPHONE ENCOUNTER
Called patient to discuss this. She states she almost slipped in her shower. She is independent in almost all of her ADLs. Does not want Home Health, but is worried about falling. She would like to come see me to get a handicap sticker as it becomes hard to walk from parking lot to places. Will make an appointment with me tomorrow for this as well as further evaluation for her falls to get her the DME that she needs. Unsure of what [employer] uses.     Sabrina Dooley MD  [facility] Family Medicine, PGY-2  05/22/2024      ----- Message from Cande Sanders LPN sent at 5/17/2024  2:58 PM CDT -----    ----- Message -----  From: Ann Salazar  Sent: 5/17/2024   2:56 PM CDT  To: Soumya Bennett Staff      Patient Returning Call        Who Called:pt  Does the patient know what this is regarding?:pt need a shower chair asking for a order for one to be sent over due to pt falling why standing in shower please call pt  Would the patient rather a call back or a response via [facility]? call  Best Call Back Number:[phone]  Additional Information: call back

## 2024-05-23 ENCOUNTER — OFFICE VISIT (OUTPATIENT)
Dept: FAMILY MEDICINE | Facility: HOSPITAL | Age: 79
End: 2024-05-23
Payer: MEDICARE

## 2024-05-23 VITALS
HEART RATE: 81 BPM | DIASTOLIC BLOOD PRESSURE: 66 MMHG | BODY MASS INDEX: 30.34 KG/M2 | HEIGHT: 64 IN | SYSTOLIC BLOOD PRESSURE: 104 MMHG | WEIGHT: 177.69 LBS

## 2024-05-23 DIAGNOSIS — R53.81 PHYSICAL DECONDITIONING: ICD-10-CM

## 2024-05-23 DIAGNOSIS — E11.9 TYPE 2 DIABETES MELLITUS WITHOUT COMPLICATION, WITHOUT LONG-TERM CURRENT USE OF INSULIN: Primary | ICD-10-CM

## 2024-05-23 DIAGNOSIS — R73.03 PREDIABETES: ICD-10-CM

## 2024-05-23 PROBLEM — M79.673 PAIN OF FOOT: Status: RESOLVED | Noted: 2022-02-04 | Resolved: 2024-05-23

## 2024-05-23 PROBLEM — R10.11 RUQ PAIN: Status: RESOLVED | Noted: 2023-03-05 | Resolved: 2024-05-23

## 2024-05-23 PROBLEM — Z01.00 ROUTINE EYE EXAM: Status: RESOLVED | Noted: 2023-02-25 | Resolved: 2024-05-23

## 2024-05-23 PROBLEM — R25.2 LEG CRAMPS: Status: RESOLVED | Noted: 2023-09-01 | Resolved: 2024-05-23

## 2024-05-23 PROBLEM — R07.9 CHEST PAIN OF UNCERTAIN ETIOLOGY: Status: RESOLVED | Noted: 2023-03-05 | Resolved: 2024-05-23

## 2024-05-23 PROBLEM — R55 NEAR SYNCOPE: Status: RESOLVED | Noted: 2024-03-26 | Resolved: 2024-05-23

## 2024-05-23 PROBLEM — Z28.9 DELAYED IMMUNIZATIONS: Status: RESOLVED | Noted: 2023-02-25 | Resolved: 2024-05-23

## 2024-05-23 PROCEDURE — 99214 OFFICE O/P EST MOD 30 MIN: CPT

## 2024-05-23 RX ORDER — METFORMIN HYDROCHLORIDE 500 MG/1
500 TABLET ORAL 2 TIMES DAILY WITH MEALS
Qty: 180 TABLET | Refills: 1 | Status: SHIPPED | OUTPATIENT
Start: 2024-05-23

## 2024-05-23 RX ORDER — FUROSEMIDE 20 MG/1
20 TABLET ORAL DAILY PRN
Qty: 90 TABLET | Refills: 1 | Status: SHIPPED | OUTPATIENT
Start: 2024-05-23

## 2024-05-23 NOTE — PROGRESS NOTES
[facility]    Subjective:     Aretha Holt is a 79 y.o. year old female with PMHx of T2DM, anxiety and mild MR who presents to clinic for follow up. She has no complaints today.    Requesting handicap sticker and shower chair 2/2 falls and instability at home. She states her bathroom currently is undergoing construction and she had to get her shower door replaced. Was previously glass which she reports was dangerous. Does not walk with assistance currently, but does get SOB when she walks a few blocks. Wants to try water aerobics again for physical deconditioning. She states helped a lot and made her feel great. Had more energy but was paying out of pocket.     DM: Last A1c 6.3% (8 months ago); patient does not complain of numbness/tingling in bilateral feet.  Patient denies polyuria, polydipsia, polyphagia. Patient endorses compliance with medication regimen, including Metformin 500 BID. Denies any episodes of hypoglycemia.    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    RUQ pain 03/05/2023    Chest pain of uncertain etiology 03/05/2023    Arthritis of both knees 02/27/2023    Gastroesophageal reflux disease 02/27/2023    Routine eye exam 02/25/2023    Delayed immunizations 02/25/2023    Postmenopausal 02/04/2022    Mitral valve insufficiency 02/04/2022    Pain of foot 02/04/2022    Weakness 06/13/2021    Anxiety 06/13/2021    Type 2 diabetes mellitus without complication, without long-term current use of insulin 06/13/2021    Left shoulder pain 06/12/2021    Bilateral edema of lower extremity 05/21/2017     "    Review of patient's allergies indicates:   Allergen Reactions    Decadron [dexamethasone] Other (See Comments)     Syncope (causes spike in blood glucose with previous bout of syncope)    Celestone [betamethasone sodium phosphate]     Celestone [betamethasone] Other (See Comments)     Syncope (causes spike in blood glucose with previous bout of syncope)    Decadron-la         Past Medical History:   Diagnosis Date    Arthritis of both knees 2023    Cystitis 2021    Diabetes mellitus     Gastroesophageal reflux disease 2023    Type 2 diabetes mellitus without complication 2021      Past Surgical History:   Procedure Laterality Date     SECTION      CHOLECYSTECTOMY      CHOLECYSTECTOMY        Family History   Problem Relation Name Age of Onset    Diabetes Mother      No Known Problems Father        Social History     Tobacco Use    Smoking status: Former     Current packs/day: 0.00     Types: Cigarettes     Quit date: 6/10/2014     Years since quittin.9    Smokeless tobacco: Never   Substance Use Topics    Alcohol use: No        Objective:     Vitals:    24 1420   BP: 104/66   Pulse: 81   Weight: 80.6 kg (177 lb 11.1 oz)   Height: 5' 4" (1.626 m)     Body mass index is 30.5 kg/m².    Physical Exam  Vitals reviewed.   Constitutional:       General: She is not in acute distress.     Appearance: She is not toxic-appearing.   Eyes:      Extraocular Movements: Extraocular movements intact.   Cardiovascular:      Rate and Rhythm: Normal rate and regular rhythm.      Heart sounds: Normal heart sounds.   Pulmonary:      Effort: Pulmonary effort is normal. No respiratory distress.      Breath sounds: Normal breath sounds. No wheezing, rhonchi or rales.   Musculoskeletal:      Right lower leg: No edema.      Left lower leg: No edema.      Comments: 5/5 strength in bilateral upper and lower extremities   Skin:     General: Skin is warm.   Neurological:      General: No focal deficit " present.      Mental Status: She is alert and oriented to person, place, and time. Mental status is at baseline.   Psychiatric:         Mood and Affect: Mood normal.         Behavior: Behavior normal.         Thought Content: Thought content normal.       Assessment/Plan:     Aretha Holt is a 79 y.o. year old female who presents to clinic for follow up. Healthy given her age. She denies any concerns today.    1. Type 2 diabetes mellitus without complication, without long-term current use of insulin  - Controlled with current regimen  - Continue metFORMIN (GLUCOPHAGE) 500 MG tablet; Take 1 tablet (500 mg total) by mouth 2 (two) times daily with meals.  Dispense: 180 tablet; Refill: 1  - Repeat Hemoglobin A1C; Future  - Ambulatory referral/consult to Ophthalmology for annual eye exam    2. Physical deconditioning  - Suspect 2/2 age as patient is relatively healthy and well-appearing for her age  - Handicap sticker filled out today  - Ambulatory referral/consult to Physical/Occupational Therapy for aerobic therapy for strengthening and decrease risk of falls  - BATH/SHOWER CHAIR FOR HOME USE. Order faxed to DME today    Follow-up: 3 months or sooner if needed    A total of 35 minutes was spent on patient care during this encounter which included chart review, examining the patient, formulating a treatment plan and documentation.     Medical decision making straight forward and not complex during this visit.     Case discussed with staff: Dr. Feliz Dooley MD  [facility], PGY-2  05/23/2024

## 2024-05-27 ENCOUNTER — TELEPHONE (OUTPATIENT)
Dept: FAMILY MEDICINE | Facility: HOSPITAL | Age: 79
End: 2024-05-27
Payer: MEDICARE

## 2024-05-27 NOTE — PROGRESS NOTES
I assume primary medical responsibility for this patient. I have reviewed the history, physical, and assessement & treatment plan with the resident and agree that the care is reasonable and necessary. This service has been performed by a resident without the presence of a teaching physician under the primary care exception. If necessary, an addendum of additional findings or evaluation beyond the resident documentation will be noted below.      Elisha Piper MD    [facility]

## 2024-05-27 NOTE — TELEPHONE ENCOUNTER
Msg left----- Message from Tosha Mandujano sent at 5/24/2024 11:15 AM CDT -----  Type:  Handicap Sticker    Who Called : Pt  Does the patient know what this is regarding?: pt states provider filled out her handicap paperwork, but the office stated she cannot have any scratch out on the form. Pt needs form refilled out by provider  Would the patient rather a call back or a response via [facility]? Call  Best Call Back Number: [phone]   Additional Information:  Pt states she can come to office if needed

## 2024-06-03 ENCOUNTER — LAB VISIT (OUTPATIENT)
Dept: LAB | Facility: HOSPITAL | Age: 79
End: 2024-06-03
Payer: MEDICARE

## 2024-06-03 DIAGNOSIS — E11.9 TYPE 2 DIABETES MELLITUS WITHOUT COMPLICATION, WITHOUT LONG-TERM CURRENT USE OF INSULIN: ICD-10-CM

## 2024-06-03 LAB
ESTIMATED AVG GLUCOSE: 126 MG/DL (ref 68–131)
HBA1C MFR BLD: 6 % (ref 4–5.6)

## 2024-06-03 PROCEDURE — 83036 HEMOGLOBIN GLYCOSYLATED A1C: CPT

## 2024-06-03 PROCEDURE — 36415 COLL VENOUS BLD VENIPUNCTURE: CPT | Mod: PN

## 2024-06-11 ENCOUNTER — OFFICE VISIT (OUTPATIENT)
Dept: FAMILY MEDICINE | Facility: CLINIC | Age: 79
End: 2024-06-11
Payer: MEDICARE

## 2024-06-11 VITALS
TEMPERATURE: 98 F | WEIGHT: 178.69 LBS | SYSTOLIC BLOOD PRESSURE: 110 MMHG | OXYGEN SATURATION: 95 % | HEART RATE: 89 BPM | BODY MASS INDEX: 30.51 KG/M2 | HEIGHT: 64 IN | DIASTOLIC BLOOD PRESSURE: 68 MMHG

## 2024-06-11 DIAGNOSIS — I70.0 AORTIC ATHEROSCLEROSIS: ICD-10-CM

## 2024-06-11 DIAGNOSIS — Z00.00 ENCOUNTER FOR PREVENTIVE HEALTH EXAMINATION: ICD-10-CM

## 2024-06-11 DIAGNOSIS — E66.09 CLASS 1 OBESITY DUE TO EXCESS CALORIES WITH SERIOUS COMORBIDITY AND BODY MASS INDEX (BMI) OF 30.0 TO 30.9 IN ADULT: ICD-10-CM

## 2024-06-11 DIAGNOSIS — I34.0 MITRAL VALVE INSUFFICIENCY, UNSPECIFIED ETIOLOGY: ICD-10-CM

## 2024-06-11 DIAGNOSIS — Z00.00 ENCOUNTER FOR MEDICARE ANNUAL WELLNESS EXAM: Primary | ICD-10-CM

## 2024-06-11 DIAGNOSIS — F41.9 ANXIETY: Chronic | ICD-10-CM

## 2024-06-11 DIAGNOSIS — K21.9 GASTROESOPHAGEAL REFLUX DISEASE, UNSPECIFIED WHETHER ESOPHAGITIS PRESENT: ICD-10-CM

## 2024-06-11 DIAGNOSIS — M17.0 ARTHRITIS OF BOTH KNEES: ICD-10-CM

## 2024-06-11 DIAGNOSIS — L30.9 ECZEMA, UNSPECIFIED TYPE: ICD-10-CM

## 2024-06-11 DIAGNOSIS — E11.9 TYPE 2 DIABETES MELLITUS WITHOUT COMPLICATION, WITHOUT LONG-TERM CURRENT USE OF INSULIN: ICD-10-CM

## 2024-06-11 DIAGNOSIS — R60.0 BILATERAL EDEMA OF LOWER EXTREMITY: ICD-10-CM

## 2024-06-11 PROBLEM — E66.01 MORBID (SEVERE) OBESITY DUE TO EXCESS CALORIES: Status: ACTIVE | Noted: 2024-06-11

## 2024-06-11 PROBLEM — E66.811 CLASS 1 OBESITY DUE TO EXCESS CALORIES WITH SERIOUS COMORBIDITY AND BODY MASS INDEX (BMI) OF 30.0 TO 30.9 IN ADULT: Status: ACTIVE | Noted: 2024-06-11

## 2024-06-11 PROCEDURE — G0438 PPPS, INITIAL VISIT: HCPCS | Mod: S$GLB,,, | Performed by: PHYSICIAN ASSISTANT

## 2024-06-11 PROCEDURE — 1160F RVW MEDS BY RX/DR IN RCRD: CPT | Mod: CPTII,S$GLB,, | Performed by: PHYSICIAN ASSISTANT

## 2024-06-11 PROCEDURE — 1101F PT FALLS ASSESS-DOCD LE1/YR: CPT | Mod: CPTII,S$GLB,, | Performed by: PHYSICIAN ASSISTANT

## 2024-06-11 PROCEDURE — 3288F FALL RISK ASSESSMENT DOCD: CPT | Mod: CPTII,S$GLB,, | Performed by: PHYSICIAN ASSISTANT

## 2024-06-11 PROCEDURE — 3078F DIAST BP <80 MM HG: CPT | Mod: CPTII,S$GLB,, | Performed by: PHYSICIAN ASSISTANT

## 2024-06-11 PROCEDURE — 1126F AMNT PAIN NOTED NONE PRSNT: CPT | Mod: CPTII,S$GLB,, | Performed by: PHYSICIAN ASSISTANT

## 2024-06-11 PROCEDURE — 1159F MED LIST DOCD IN RCRD: CPT | Mod: CPTII,S$GLB,, | Performed by: PHYSICIAN ASSISTANT

## 2024-06-11 PROCEDURE — 3074F SYST BP LT 130 MM HG: CPT | Mod: CPTII,S$GLB,, | Performed by: PHYSICIAN ASSISTANT

## 2024-06-11 PROCEDURE — 1158F ADVNC CARE PLAN TLK DOCD: CPT | Mod: CPTII,S$GLB,, | Performed by: PHYSICIAN ASSISTANT

## 2024-06-11 PROCEDURE — 1170F FXNL STATUS ASSESSED: CPT | Mod: CPTII,S$GLB,, | Performed by: PHYSICIAN ASSISTANT

## 2024-06-11 NOTE — PROGRESS NOTES
"  Aretha Holt presented for an initial Medicare AWV today. The following components were reviewed and updated:    Medical history  Family History  Social history  Allergies and Current Medications  Health Risk Assessment  Health Maintenance  Care Team    **See Completed Assessments for Annual Wellness visit with in the encounter summary    The following assessments were completed:  Depression Screening  Cognitive function Screening    Timed Get Up Test  Whisper Test      Opioid documentation:      Patient does not have a current opioid prescription.          Vitals:    06/11/24 1526   BP: 110/68   BP Location: Right arm   Patient Position: Sitting   Pulse: 89   Temp: 97.7 °F (36.5 °C)   SpO2: 95%   Weight: 81.1 kg (178 lb 10.9 oz)   Height: 5' 4" (1.626 m)     Body mass index is 30.67 kg/m².       Physical Exam  Constitutional:       General: She is not in acute distress.     Appearance: Normal appearance. She is obese.   HENT:      Head: Normocephalic and atraumatic.   Eyes:      General: Lids are normal. Gaze aligned appropriately.      Pupils: Pupils are equal, round, and reactive to light.   Neck:      Trachea: Trachea normal.   Cardiovascular:      Rate and Rhythm: Normal rate and regular rhythm.      Heart sounds: S1 normal and S2 normal.   Pulmonary:      Effort: Pulmonary effort is normal.      Breath sounds: Normal breath sounds.   Abdominal:      General: Bowel sounds are normal. There is no distension.      Palpations: Abdomen is soft.      Tenderness: There is no abdominal tenderness.   Musculoskeletal:      Cervical back: Neck supple.      Right lower leg: No edema.      Left lower leg: No edema.   Lymphadenopathy:      Cervical: No cervical adenopathy.   Skin:     General: Skin is cool and dry.   Neurological:      Mental Status: She is alert and oriented to person, place, and time.   Psychiatric:         Attention and Perception: Attention normal.         Mood and Affect: Mood normal.         " Behavior: Behavior is cooperative.         Thought Content: Thought content normal.         Diagnoses and health risks identified today and associated recommendations/orders:    1. Encounter for Medicare annual wellness exam  - Chart reviewed. Problem list updated. Discussed current medical diagnosis, current medications, medical/surgical/family/social history; updated provider list; documented vital signs; identified any cognitive impairment; and updated risk factor list. Addressed any outstanding health maintenance. Provided patient with personalized health advice. Continue to follow up with PCP and any specialists.     2. Encounter for preventive health examination  - Chart reviewed. Problem list updated. Discussed current medical diagnosis, current medications, medical/surgical/family/social history; updated provider list; documented vital signs; identified any cognitive impairment; and updated risk factor list. Addressed any outstanding health maintenance. Provided patient with personalized health advice. Continue to follow up with PCP and any specialists.     3. Aortic atherosclerosis  - Chronic, stable  - Continue to monitor  - Follow with PCP    4. Anxiety  - Chronic, stable  - Continue to monitor  - Follow with PCP    5. Eczema, unspecified type  - Chronic, stable  - Continue moisturizer  - Follow with PCP    6. Mitral valve insufficiency, unspecified etiology  - Chronic, stable  - Continue lasix  - Follow with cardiology    7. Type 2 diabetes mellitus without complication, without long-term current use of insulin  - Chronic, stable  - Continue metformin  - Follow with PCP  Orders  - Ambulatory referral/consult to Ophthalmology; Future    8. Gastroesophageal reflux disease, unspecified whether esophagitis present  - Chronic, stable  - Continue protonix  - Follow with PCP    9. Arthritis of both knees  - Chronic, stable  - Continue OTC ibuprofen and tylenol  - Follow with PCP    10. Bilateral edema of lower  extremity  - Chronic, stable  - Continue lasix  - Follow with PCP    11. Class 1 obesity due to excess calories with serious comorbidity and body mass index (BMI) of 30.0 to 30.9 in adult  - Body mass index is 30.67 kg/m².  - Recommendation for healthy diet and increasing exercise as tolerated with goal of 150min/week. Recommend weight loss.        Provided Aretha with a 5-10 year written screening schedule and personal prevention plan. Recommendations were developed using the USPSTF age appropriate recommendations. Education, counseling, and referrals were provided as needed.  After Visit Summary printed and given to patient which includes a list of additional screenings\tests needed.    No follow-ups on file.      Viji Sethi PA-C      Advance Care Planning   I offered to discuss advanced care planning, including how to pick a person who would make decisions for you if you were unable to make them for yourself, called a health care power of , and what kind of decisions you might make such as use of life sustaining treatments such as ventilators and tube feeding when faced with a life limiting illness recorded on a living will that they will need to know. (How you want to be cared for as you near the end of your natural life)     X Patient is interested in learning more about how to make advanced directives.  I provided them paperwork and offered to discuss this with them.

## 2024-06-11 NOTE — PATIENT INSTRUCTIONS
Counseling and Referral of Other Preventative  (Italic type indicates deductible and co-insurance are waived)    Patient Name: Aretha Holt  Today's Date: 6/11/2024    Health Maintenance       Date Due Completion Date    RSV Vaccine (Age 60+ and Pregnant patients) (1 - 1-dose 60+ series) Never done ---    Shingles Vaccine (2 of 2) 09/20/2022 7/26/2022    Pneumococcal Vaccines (Age 65+) (2 of 2 - PCV) 02/04/2023 2/4/2022    Eye Exam 02/07/2023 2/7/2022    COVID-19 Vaccine (5 - 2023-24 season) 09/01/2023 10/28/2022    Influenza Vaccine (Season Ended) 09/01/2024 1/12/2022    Diabetes Urine Screening 09/01/2024 9/1/2023    Lipid Panel 09/01/2024 9/1/2023    Hemoglobin A1c 12/03/2024 6/3/2024    DEXA Scan 08/08/2025 8/8/2022    TETANUS VACCINE 09/15/2031 9/15/2021        No orders of the defined types were placed in this encounter.    The following information is provided to all patients.  This information is to help you find resources for any of the problems found today that may be affecting your health:                  Living healthy guide: www.[facility].[place].gov      Understanding Diabetes: www.diabetes.org      Eating healthy: www.cdc.gov/healthyweight      CDC home safety checklist: www.cdc.gov/steadi/patient.html      Agency on Aging: www.goea.[place].gov      Alcoholics anonymous (AA): www.aa.org      Physical Activity: www.[place].nih.gov/ew0yxsp      Tobacco use: www.quitwithusla.org

## 2024-06-17 ENCOUNTER — HOSPITAL ENCOUNTER (OUTPATIENT)
Dept: CARDIOLOGY | Facility: HOSPITAL | Age: 79
Discharge: HOME | End: 2024-06-17
Attending: INTERNAL MEDICINE
Payer: MEDICARE

## 2024-06-17 ENCOUNTER — HOSPITAL ENCOUNTER (OUTPATIENT)
Dept: RADIOLOGY | Facility: HOSPITAL | Age: 79
Discharge: HOME | End: 2024-06-17
Attending: INTERNAL MEDICINE
Payer: MEDICARE

## 2024-06-17 DIAGNOSIS — R07.2 PRECORDIAL PAIN: ICD-10-CM

## 2024-06-17 DIAGNOSIS — R55 NEAR SYNCOPE: ICD-10-CM

## 2024-06-17 DIAGNOSIS — R53.1 WEAKNESS: ICD-10-CM

## 2024-06-17 DIAGNOSIS — R07.9 CHEST PAIN: ICD-10-CM

## 2024-06-17 DIAGNOSIS — R07.9 CHEST PAIN OF UNCERTAIN ETIOLOGY: ICD-10-CM

## 2024-06-17 DIAGNOSIS — K21.9 GASTROESOPHAGEAL REFLUX DISEASE, UNSPECIFIED WHETHER ESOPHAGITIS PRESENT: ICD-10-CM

## 2024-06-17 DIAGNOSIS — E11.9 TYPE 2 DIABETES MELLITUS WITHOUT COMPLICATION, WITHOUT LONG-TERM CURRENT USE OF INSULIN: ICD-10-CM

## 2024-06-17 LAB
CV STRESS BASE HR: 59 BPM
DIASTOLIC BLOOD PRESSURE: 55 MMHG
OHS CV CPX 85 PERCENT MAX PREDICTED HEART RATE MALE: 120
OHS CV CPX MAX PREDICTED HEART RATE: 141
OHS CV CPX PATIENT IS FEMALE: 1
OHS CV CPX PATIENT IS MALE: 0
OHS CV CPX PEAK DIASTOLIC BLOOD PRESSURE: 68 MMHG
OHS CV CPX PEAK HEAR RATE: 98 BPM
OHS CV CPX PEAK RATE PRESSURE PRODUCT: NORMAL
OHS CV CPX PEAK SYSTOLIC BLOOD PRESSURE: 123 MMHG
OHS CV CPX PERCENT MAX PREDICTED HEART RATE ACHIEVED: 72
OHS CV CPX RATE PRESSURE PRODUCT PRESENTING: 6844
SYSTOLIC BLOOD PRESSURE: 116 MMHG

## 2024-06-17 PROCEDURE — 78452 HT MUSCLE IMAGE SPECT MULT: CPT | Mod: TC,PN

## 2024-06-17 PROCEDURE — 63600175 PHARM REV CODE 636 W HCPCS: Mod: PN | Performed by: INTERNAL MEDICINE

## 2024-06-17 PROCEDURE — 93017 CV STRESS TEST TRACING ONLY: CPT | Mod: PN

## 2024-06-17 PROCEDURE — A9502 TC99M TETROFOSMIN: HCPCS | Mod: PN | Performed by: INTERNAL MEDICINE

## 2024-06-17 RX ORDER — REGADENOSON 0.08 MG/ML
0.4 INJECTION, SOLUTION INTRAVENOUS ONCE
Status: COMPLETED | OUTPATIENT
Start: 2024-06-17 | End: 2024-06-17

## 2024-06-17 RX ADMIN — REGADENOSON 0.4 MG: 0.08 INJECTION, SOLUTION INTRAVENOUS at 12:06

## 2024-06-17 RX ADMIN — TETROFOSMIN 10 MILLICURIE: 1.38 INJECTION, POWDER, LYOPHILIZED, FOR SOLUTION INTRAVENOUS at 10:06

## 2024-06-17 RX ADMIN — TETROFOSMIN 30 MILLICURIE: 1.38 INJECTION, POWDER, LYOPHILIZED, FOR SOLUTION INTRAVENOUS at 01:06

## 2024-06-25 ENCOUNTER — TELEPHONE (OUTPATIENT)
Dept: FAMILY MEDICINE | Facility: HOSPITAL | Age: 79
End: 2024-06-25
Payer: MEDICARE

## 2024-06-25 NOTE — PROGRESS NOTES
[facility] Cardiology 701     SUBJECTIVE:     History of Present Illness:  Patient is a 79 y.o. female presents to as a follow up to reviewed his imaging.    NO COMPLAINTS. FOLLOW UP    Primary Diagnosis:   Hypertension: none  DM: positive  Smoker: quit over > 10 years  Family history of early CAD  Heart disease : none  ROS  No chest pains  No con complaints  No syncope  No palpitations  Activity: does house work; does walk all over in the [place] without issues     MPI 24  Impression:     1.  Scintigraphically negative for ischemia or infarct.  2. The global left ventricular systolic function is normal with an LV ejection fraction of 83 % and no evidence of LV dilatation. Wall motion is normal.         Review of patient's allergies indicates:   Allergen Reactions    Decadron [dexamethasone] Other (See Comments)     Syncope (causes spike in blood glucose with previous bout of syncope)    Celestone [betamethasone sodium phosphate]     Celestone [betamethasone] Other (See Comments)     Syncope (causes spike in blood glucose with previous bout of syncope)    Decadron-la        Past Medical History:   Diagnosis Date    Arthritis of both knees 2023    Chest pain of uncertain etiology 2023    Cystitis 2021    Delayed immunizations 2023    Diabetes mellitus     Gastroesophageal reflux disease 2023    Leg cramps 2023    Near syncope 2024    Pain of foot 2022    RUQ pain 2023    Type 2 diabetes mellitus without complication 2021       Past Surgical History:   Procedure Laterality Date     SECTION      CHOLECYSTECTOMY      CHOLECYSTECTOMY             Past Hospitalization:     ER visit 10/23: shortness of breath and weakness; CT negative for PE     Cardiac meds:  Lasix 20 mg as needed  Metformin 500 mg BID        OBJECTIVE:     Vital Signs (Most Recent)  There were no vitals filed for this visit.        Physical Exam:  Neck: normal carotids, no bruits; normal  JVP  Lungs :clear  Heart: RR, normal S1,S2, no murmurs, no gallops  Abd: no masses; no bruits;   Exts: normal DP and PT pulses bilaterally, normal radials; no edema noted               Labs    10/23: BNP normal; troponin negative; TSH normal; CBC normal; GFR 58; LDL 74, A1c 6.3  Diagnostic Results:    1.EKG: 10/23: sinus; nonspecific T; otherwise normal   2.Echo: 3/23: normal EF; normal diastology   3. Stress test: nuclear stress: 10/22: negative for ischemia; normal EF   4. cath  5. Venous US:3/23 no DVT   Chart review:        ASSESSMENT/PLAN:     Blood pressures normal on no medications  Lipids great on no medications  No symptoms of angina or failure - all previous cardiac workup negative   DM: A1c 6.0s    Plan: reassurance  No need for any further cardiac testing   Return as needed

## 2024-06-25 NOTE — TELEPHONE ENCOUNTER
Attempted to call patient regarding this message. No response. Needs to schedule an appointment with me to discuss and see if a referral is appropriate.    Sabrina Dooley MD  [facility], PGY-2  06/25/2024      ----- Message from Cande Sanders LPN sent at 6/21/2024  5:43 PM CDT -----    ----- Message -----  From: Tosha Mandujano  Sent: 6/21/2024   1:46 PM CDT  To: Soumya Bennett Staff    Type:  Patient Requesting Referral    Who Called: Pt   Does the patient already have the specialty appointment scheduled?:No  If yes, what is the date of that appointment?: N/A  Referral to What Specialty: Dermatologist  Reason for Referral: hair loss   Does the patient want the referral with a specific physician?: Open   [facility] the [facility]  Patient Requesting a Response?: Yes  Would the patient rather a call back or a response via [facility]? Call   Best Call Back Number: [phone]  Additional Information:

## 2024-06-26 ENCOUNTER — OFFICE VISIT (OUTPATIENT)
Dept: CARDIOLOGY | Facility: CLINIC | Age: 79
End: 2024-06-26
Payer: MEDICARE

## 2024-06-26 VITALS
OXYGEN SATURATION: 98 % | WEIGHT: 177.38 LBS | BODY MASS INDEX: 30.28 KG/M2 | HEIGHT: 64 IN | DIASTOLIC BLOOD PRESSURE: 73 MMHG | SYSTOLIC BLOOD PRESSURE: 117 MMHG

## 2024-06-26 DIAGNOSIS — R07.9 CHEST PAIN, UNSPECIFIED TYPE: ICD-10-CM

## 2024-06-26 DIAGNOSIS — E11.9 TYPE 2 DIABETES MELLITUS WITHOUT COMPLICATION, WITHOUT LONG-TERM CURRENT USE OF INSULIN: ICD-10-CM

## 2024-06-26 DIAGNOSIS — R60.0 BILATERAL EDEMA OF LOWER EXTREMITY: ICD-10-CM

## 2024-06-26 DIAGNOSIS — R55 NEAR SYNCOPE: Primary | ICD-10-CM

## 2024-06-26 PROCEDURE — 99999 PR PBB SHADOW E&M-EST. PATIENT-LVL III: CPT | Mod: PBBFAC,,, | Performed by: STUDENT IN AN ORGANIZED HEALTH CARE EDUCATION/TRAINING PROGRAM

## 2024-10-12 ENCOUNTER — HOSPITAL ENCOUNTER (EMERGENCY)
Facility: HOSPITAL | Age: 79
Discharge: HOME | End: 2024-10-12
Attending: STUDENT IN AN ORGANIZED HEALTH CARE EDUCATION/TRAINING PROGRAM
Payer: MEDICARE

## 2024-10-12 VITALS
TEMPERATURE: 98 F | DIASTOLIC BLOOD PRESSURE: 61 MMHG | SYSTOLIC BLOOD PRESSURE: 132 MMHG | OXYGEN SATURATION: 100 % | RESPIRATION RATE: 18 BRPM | HEIGHT: 64 IN | BODY MASS INDEX: 29.02 KG/M2 | WEIGHT: 170 LBS | HEART RATE: 69 BPM

## 2024-10-12 DIAGNOSIS — F43.9 STRESS AT HOME: ICD-10-CM

## 2024-10-12 DIAGNOSIS — R07.9 CHEST PAIN: Primary | ICD-10-CM

## 2024-10-12 LAB
ALBUMIN SERPL BCP-MCNC: 4.1 G/DL (ref 3.5–5.2)
ALP SERPL-CCNC: 110 U/L (ref 38–126)
ALT SERPL W/O P-5'-P-CCNC: 13 U/L (ref 10–44)
ANION GAP SERPL CALC-SCNC: 7 MMOL/L (ref 8–16)
AST SERPL-CCNC: 21 U/L (ref 15–46)
BASOPHILS # BLD AUTO: 0.03 K/UL (ref 0–0.2)
BASOPHILS NFR BLD: 0.3 % (ref 0–1.9)
BILIRUB SERPL-MCNC: 0.2 MG/DL (ref 0.1–1)
CALCIUM SERPL-MCNC: 8.8 MG/DL (ref 8.7–10.5)
CHLORIDE SERPL-SCNC: 106 MMOL/L (ref 95–110)
CO2 SERPL-SCNC: 28 MMOL/L (ref 23–29)
CREAT SERPL-MCNC: 0.99 MG/DL (ref 0.5–1.4)
D DIMER PPP IA.FEU-MCNC: 0.88 MG/L FEU
DIFFERENTIAL METHOD BLD: ABNORMAL
EOSINOPHIL # BLD AUTO: 0.2 K/UL (ref 0–0.5)
EOSINOPHIL NFR BLD: 2 % (ref 0–8)
ERYTHROCYTE [DISTWIDTH] IN BLOOD BY AUTOMATED COUNT: 13.4 % (ref 11.5–14.5)
EST. GFR  (NO RACE VARIABLE): 58 ML/MIN/1.73 M^2
GLUCOSE SERPL-MCNC: 119 MG/DL (ref 70–110)
HCT VFR BLD AUTO: 37 % (ref 37–48.5)
HGB BLD-MCNC: 12.2 G/DL (ref 12–16)
IMM GRANULOCYTES # BLD AUTO: 0.03 K/UL (ref 0–0.04)
IMM GRANULOCYTES NFR BLD AUTO: 0.3 % (ref 0–0.5)
LYMPHOCYTES # BLD AUTO: 3.1 K/UL (ref 1–4.8)
LYMPHOCYTES NFR BLD: 31.9 % (ref 18–48)
MCH RBC QN AUTO: 31 PG (ref 27–31)
MCHC RBC AUTO-ENTMCNC: 33 G/DL (ref 32–36)
MCV RBC AUTO: 94 FL (ref 82–98)
MONOCYTES # BLD AUTO: 0.7 K/UL (ref 0.3–1)
MONOCYTES NFR BLD: 7.4 % (ref 4–15)
NEUTROPHILS # BLD AUTO: 5.6 K/UL (ref 1.8–7.7)
NEUTROPHILS NFR BLD: 58.1 % (ref 38–73)
NRBC BLD-RTO: 0 /100 WBC
PLATELET # BLD AUTO: 274 K/UL (ref 150–450)
PMV BLD AUTO: 9.3 FL (ref 9.2–12.9)
POTASSIUM SERPL-SCNC: 3.7 MMOL/L (ref 3.5–5.1)
PROT SERPL-MCNC: 7.1 G/DL (ref 6–8.4)
RBC # BLD AUTO: 3.94 M/UL (ref 4–5.4)
SODIUM SERPL-SCNC: 141 MMOL/L (ref 136–145)
TROPONIN I SERPL-MCNC: <0.012 NG/ML (ref 0.01–0.03)
UUN UR-MCNC: 14 MG/DL (ref 7–17)
WBC # BLD AUTO: 9.58 K/UL (ref 3.9–12.7)

## 2024-10-12 PROCEDURE — 99285 EMERGENCY DEPT VISIT HI MDM: CPT | Mod: 25,ER

## 2024-10-12 PROCEDURE — 93010 ELECTROCARDIOGRAM REPORT: CPT | Mod: ,,, | Performed by: STUDENT IN AN ORGANIZED HEALTH CARE EDUCATION/TRAINING PROGRAM

## 2024-10-12 PROCEDURE — 80053 COMPREHEN METABOLIC PANEL: CPT | Mod: ER | Performed by: STUDENT IN AN ORGANIZED HEALTH CARE EDUCATION/TRAINING PROGRAM

## 2024-10-12 PROCEDURE — 85025 COMPLETE CBC W/AUTO DIFF WBC: CPT | Mod: ER | Performed by: STUDENT IN AN ORGANIZED HEALTH CARE EDUCATION/TRAINING PROGRAM

## 2024-10-12 PROCEDURE — 93005 ELECTROCARDIOGRAM TRACING: CPT | Mod: ER

## 2024-10-12 PROCEDURE — 84484 ASSAY OF TROPONIN QUANT: CPT | Mod: ER | Performed by: STUDENT IN AN ORGANIZED HEALTH CARE EDUCATION/TRAINING PROGRAM

## 2024-10-12 PROCEDURE — 85379 FIBRIN DEGRADATION QUANT: CPT | Mod: ER | Performed by: STUDENT IN AN ORGANIZED HEALTH CARE EDUCATION/TRAINING PROGRAM

## 2024-10-12 PROCEDURE — 25000003 PHARM REV CODE 250: Mod: ER | Performed by: STUDENT IN AN ORGANIZED HEALTH CARE EDUCATION/TRAINING PROGRAM

## 2024-10-12 PROCEDURE — 25500020 PHARM REV CODE 255: Mod: ER | Performed by: STUDENT IN AN ORGANIZED HEALTH CARE EDUCATION/TRAINING PROGRAM

## 2024-10-12 RX ORDER — ASPIRIN 325 MG
325 TABLET ORAL
Status: COMPLETED | OUTPATIENT
Start: 2024-10-12 | End: 2024-10-12

## 2024-10-12 RX ORDER — HYDROXYZINE HYDROCHLORIDE 25 MG/1
25 TABLET, FILM COATED ORAL 3 TIMES DAILY PRN
Qty: 20 TABLET | Refills: 0 | Status: SHIPPED | OUTPATIENT
Start: 2024-10-12

## 2024-10-12 RX ADMIN — ASPIRIN 325 MG: 325 TABLET ORAL at 06:10

## 2024-10-12 RX ADMIN — IOHEXOL 100 ML: 350 INJECTION, SOLUTION INTRAVENOUS at 08:10

## 2024-10-13 NOTE — ED PROVIDER NOTES
"NAME:  Aretha Holt  CSN:     [id number]  MRN:    [id number]  ADMIT DATE: 10/12/2024        eMERGENCY dEPARTMENT eNCOUnter    CHIEF COMPLAINT    Chief Complaint   Patient presents with    Fatigue    Chest Pain     Pt reports feeling generalized "weakness" and pain to right chest wall. Onset of symptoms this am        HPI    Aretha Holt is a 79 y.o. female with a past medical history of  has a past medical history of Arthritis of both knees (2023), Chest pain of uncertain etiology (2023), Cystitis (2021), Delayed immunizations (2023), Diabetes mellitus, Gastroesophageal reflux disease (2023), Leg cramps (2023), Near syncope (2024), Pain of foot (2022), RUQ pain (2023), and Type 2 diabetes mellitus without complication (2021).     she presents to the ED due to persistent right chest wall discomfort throughout the day today.  States initially she thought it was because her bra was too tight but no longer believes this has the case.  Started around 7:00 a.m. this morning and has been constant.  Denies any other focal symptoms.  Does note intermittent swelling of her legs, left greater than right, she elevate some of the end of the day and they do seem to improve.  Notes she is under a significant amount of stress dealing with a significant increase in her house note and a lot of financial burden.      HPI       PAST MEDICAL HISTORY  Past Medical History:   Diagnosis Date    Arthritis of both knees 2023    Chest pain of uncertain etiology 2023    Cystitis 2021    Delayed immunizations 2023    Diabetes mellitus     Gastroesophageal reflux disease 2023    Leg cramps 2023    Near syncope 2024    Pain of foot 2022    RUQ pain 2023    Type 2 diabetes mellitus without complication 2021       SURGICAL HISTORY    Past Surgical History:   Procedure Laterality Date     SECTION      " CHOLECYSTECTOMY      CHOLECYSTECTOMY         FAMILY HISTORY    Family History   Problem Relation Name Age of Onset    Stroke Mother      No Known Problems Father      No Known Problems Sister x1     Diabetes Brother x2     No Known Problems Daughter x3     No Known Problems Son x2        SOCIAL HISTORY    Social History     Socioeconomic History    Marital status:    Tobacco Use    Smoking status: Former     Current packs/day: 0.00     Types: Cigarettes     Quit date: 6/10/2014     Years since quitting: 10.3    Smokeless tobacco: Never   Substance and Sexual Activity    Alcohol use: No    Drug use: No    Sexual activity: Not Currently     Social Drivers of Health     Financial Resource Strain: Low Risk  (6/11/2024)    Overall Financial Resource Strain (CARDIA)     Difficulty of Paying Living Expenses: Not hard at all   Food Insecurity: No Food Insecurity (6/11/2024)    Hunger Vital Sign     Worried About Running Out of Food in the Last Year: Never true     Ran Out of Food in the Last Year: Never true   Transportation Needs: No Transportation Needs (6/11/2024)    PRAPARE - Transportation     Lack of Transportation (Medical): No     Lack of Transportation (Non-Medical): No   Physical Activity: Insufficiently Active (6/11/2024)    Exercise Vital Sign     Days of Exercise per Week: 3 days     Minutes of Exercise per Session: 30 min   Stress: No Stress Concern Present (6/11/2024)    [employer] - Occupational Stress Questionnaire     Feeling of Stress : Only a little   Housing Stability: High Risk (6/11/2024)    Housing Stability Vital Sign     Unable to Pay for Housing in the Last Year: Yes     Homeless in the Last Year: No       MEDICATIONS  Current Outpatient Medications   Medication Instructions    clobetasoL (TEMOVATE) 0.05 % cream Topical (Top), 2 times daily    clobetasol 0.05% (TEMOVATE) 0.05 % Oint Topical (Top), 2 times daily    furosemide (LASIX) 20 mg, Oral, Daily PRN     "hydrOXYzine HCL (ATARAX) 25 mg, Oral, 3 times daily PRN    metFORMIN (GLUCOPHAGE) 500 mg, Oral, 2 times daily with meals    pantoprazole (PROTONIX) 40 mg, Oral, Daily       ALLERGIES    Review of patient's allergies indicates:   Allergen Reactions    Decadron [dexamethasone] Other (See Comments)     Syncope (causes spike in blood glucose with previous bout of syncope)    Celestone [betamethasone sodium phosphate]     Celestone [betamethasone] Other (See Comments)     Syncope (causes spike in blood glucose with previous bout of syncope)    Decadron-la          REVIEW OF SYSTEMS   Review of Systems       PHYSICAL EXAM    Reviewed Triage Note    VITAL SIGNS:   ED Triage Vitals [10/12/24 1816]   Encounter Vitals Group      BP (!) 145/67      Systolic BP Percentile       Diastolic BP Percentile       Pulse 79      Resp 16      Temp 97.8 °F (36.6 °C)      Temp Source Oral      SpO2 98 %      Weight 170 lb      Height 5' 4"      Head Circumference       Peak Flow       Pain Score       Pain Loc       Pain Education       Exclude from Growth Chart        Patient Vitals for the past 24 hrs:   BP Temp Temp src Pulse Resp SpO2 Height Weight   10/12/24 2024 132/61 -- -- 69 18 100 % -- --   10/12/24 1901 (!) 151/67 -- -- 67 (!) 24 100 % -- --   10/12/24 1831 129/61 -- -- -- -- -- -- --   10/12/24 1816 (!) 145/67 97.8 °F (36.6 °C) Oral 79 16 98 % 5' 4" (1.626 m) 77.1 kg (170 lb)       Physical Exam    Nursing note and vitals reviewed.  Constitutional: She appears well-developed and well-nourished.   HENT:   Head: Normocephalic and atraumatic.   Eyes: EOM are normal. Pupils are equal, round, and reactive to light.   Neck: Neck supple.   Normal range of motion.  Cardiovascular:  Normal rate and regular rhythm.           Pulmonary/Chest: Breath sounds normal. No respiratory distress.   Abdominal: Abdomen is soft. There is no abdominal tenderness.   Musculoskeletal:         General: Normal range of motion.      Cervical back: Normal " range of motion and neck supple.     Neurological: She is alert and oriented to person, place, and time.   Skin: Skin is warm and dry.   Psychiatric: Her mood appears anxious.          EKG     Interpreted by EM physician if performed:   See below            LABS  Pertinent labs reviewed. (See chart for details)   Labs Reviewed   CBC W/ AUTO DIFFERENTIAL - Abnormal       Result Value    WBC 9.58      RBC 3.94 (*)     Hemoglobin 12.2      Hematocrit 37.0      MCV 94      MCH 31.0      MCHC 33.0      RDW 13.4      Platelets 274      MPV 9.3      Immature Granulocytes 0.3      Gran # (ANC) 5.6      Immature Grans (Abs) 0.03      Lymph # 3.1      Mono # 0.7      Eos # 0.2      Baso # 0.03      nRBC 0      Gran % 58.1      Lymph % 31.9      Mono % 7.4      Eosinophil % 2.0      Basophil % 0.3      Differential Method Automated     COMPREHENSIVE METABOLIC PANEL - Abnormal    Sodium 141      Potassium 3.7      Chloride 106      CO2 28      Glucose 119 (*)     BUN 14      Creatinine 0.99      Calcium 8.8      Total Protein 7.1      Albumin 4.1      Total Bilirubin 0.2      Alkaline Phosphatase 110      AST 21      ALT 13      Anion Gap 7 (*)     eGFR 58.0 (*)    D DIMER, QUANTITATIVE - Abnormal    D-Dimer 0.88 (*)    TROPONIN I    Troponin I <0.012           RADIOLOGY          Imaging Results              CTA Chest Non-Coronary (PE Studies) (Final result)  Result time 10/12/24 20:40:03      Final result by Angelo Foote MD (10/12/24 20:40:03)                   Impression:      No pulmonary embolus or acute pulmonary opacity.    Small hiatal hernia.    All CT scans at this facility are performed  using dose modulation techniques as appropriate to performed exam including the following:  automated exposure control; adjustment of mA and/or kV according to the patients size (this includes techniques or standardized protocols for targeted exams where dose is matched to indication/reason for exam: i.e. extremities or head);   iterative reconstruction technique.      Electronically signed by: Angelo Foote  Date:    10/12/2024  Time:    20:40               Narrative:    EXAMINATION:  CTA CHEST NON CORONARY (PE STUDIES)    CLINICAL HISTORY:  Pulmonary embolism (PE) suspected, positive D-dimer;    TECHNIQUE:  Low dose axial images, sagittal and coronal reformations were obtained from the thoracic inlet to the lung bases following the IV administration of 100 mL of Omnipaque 350.  Contrast timing was optimized to evaluate the pulmonary arteries.  MIP images were performed.    COMPARISON:  Multiple    FINDINGS:  Base of Neck: No significant abnormality.    Thoracic soft tissues: Unremarkable.    Aorta: Left-sided aortic arch.  No aneurysm and no significant atherosclerosis    Heart: Normal size. No effusion.    Pulmonary vasculature: Pulmonary arteries are well opacified.  There is no pulmonary thromboembolism.    Wilma/Mediastinum: No pathologic ziyad enlargement.    Airways: Patent.    Lungs/Pleura: Clear lungs. No pleural effusion or thickening.    Esophagus: Small hiatal hernia.    Upper Abdomen: No abnormality of the partially imaged upper abdomen.    Bones: No acute fracture. No suspicious lytic or sclerotic lesions.                                        PROCEDURES    Procedures      ED COURSE & MEDICAL DECISION MAKING    Pertinent Labs & Imaging studies reviewed. (See chart for details and specific orders.)          Summary of review of records:   Last visit with Cardiology in June of 2024.  Asymptomatic at that visit.  Risk factors include prediabetes, family history of early CAD and previous smoker though she quit over 10 years ago.    Nuclear med perfusion study done on June 17, 2024 with stable findings.  No evidence of ischemia or infarct.  Normal LV systolic function with an EF of 83% and no evidence of LV dilation.    Annual visit with primary care in June of this year.  Hemoglobin A1c of 6.0, consistent with  prediabetes.    Medical Decision Making  Amount and/or Complexity of Data Reviewed  External Data Reviewed: labs, radiology, ECG and notes.  Labs: ordered. Decision-making details documented in ED Course.  ECG/medicine tests: ordered and independent interpretation performed. Decision-making details documented in ED Course.    Risk  OTC drugs.      Aretha Holt is a 79 y.o. female who presents with right chest wall discomfort that has been persistent since around 7:00 a.m. today.  She notes she was under significant amount of financial stress at this time.  After long conversation with her about this she states she actually feels better and the chest pain has let up after talking about it.  She does note some intermittent swelling to the bilateral lower extremities which is not new, left greater than right.  Typically resolves upon waking when she elevates her feet at night    Differential includes but is not limited to stress reaction, clinically low suspicion for ACS, PE, pneumothorax or underlying infectious etiology            Medications   aspirin tablet 325 mg (325 mg Oral Given 10/12/24 1850)   iohexoL (OMNIPAQUE 350) injection 100 mL (100 mLs Intravenous Given 10/12/24 2011)       ED Course as of 10/13/24 0540   Sat Oct 12, 2024   1849 EKG 12-lead  Normal sinus rhythm. No ST elevation or depression.  No T-wave inversions.  No evidence of ischemia. Rate 62 [HL]   1925 CBC auto differential(!)  No acute abnormalities  [HL]   1925 Comprehensive metabolic panel(!)  No acute abnormalities  [HL]   1935 Troponin I: <0.012 [HL]   1944 D-Dimer(!): 0.88  Given right-sided pleuritic chest pain, intermittent leg swelling left greater than right, will proceed with CTA imaging at this time. [HL]   2047 CTA Chest Non-Coronary (PE Studies)  No pulmonary embolus or acute pulmonary opacity.     Small hiatal hernia.   [HL]      ED Course User Index  [HL] Flores Rojas., DO     No acute emergent medical condition has  been identified. The patient appears to be low risk for an emergent medical condition is appropriate for discharge with outpatient f/u as detailed in discharge instructions for reevaluation and possible continued outpatient workup and management. I have discussed the workup with the patient, who has verbalized understanding of the plan and need for outpatient follow-up.  This evaluation does not preclude the development of an emergent condition so in addition, I have advised the patient that they can return to the ED at any time with worsening or change of their symptoms, or with any other medical complaint.           FINAL IMPRESSION    Final diagnoses:  [R07.9] Chest pain (Primary)  [F43.9] Stress at home       DISPOSITION  Patient discharge in stable condition        ED Prescriptions       Medication Sig Dispense Start Date End Date Auth. Provider    hydrOXYzine HCL (ATARAX) 25 MG tablet Take 1 tablet (25 mg total) by mouth 3 (three) times daily as needed for Anxiety. 20 tablet 10/12/2024 -- Flores Rojas DO          Follow-up Information       Follow up With Specialties Details Why Contact Info    Sabrina Dooley MD Family Medicine Schedule an appointment as soon as possible for a visit   [address] [address] [phone]      [facility] - Emergency Dept Emergency Medicine  As needed, If symptoms worsen [address] [facility]  Emergency Department  [facility] [address] [phone]              DISCLAIMER: This note was prepared with M*FanGo voice recognition transcription software. Garbled syntax, mangled pronouns, and other bizarre constructions may be attributed to that software system.             Flores Rojas DO  10/13/24 6917

## 2024-10-14 LAB
OHS QRS DURATION: 82 MS
OHS QTC CALCULATION: 424 MS

## 2024-11-20 ENCOUNTER — TELEPHONE (OUTPATIENT)
Dept: FAMILY MEDICINE | Facility: HOSPITAL | Age: 79
End: 2024-11-20
Payer: MEDICARE

## 2024-11-20 NOTE — TELEPHONE ENCOUNTER
Spoke to patient and she no longer have any discomforts, and related it to stress. Encouraged to seek medical care immediately if any chest pains occur again. Appointment was scheduled for December 17 2024 at 4:00pm  to f/u with Dr Dooley.

## 2024-11-22 ENCOUNTER — PATIENT MESSAGE (OUTPATIENT)
Dept: RESEARCH | Facility: HOSPITAL | Age: 79
End: 2024-11-22
Payer: MEDICARE

## 2024-12-02 RX ORDER — CLOBETASOL PROPIONATE 0.5 MG/G
CREAM TOPICAL 2 TIMES DAILY
Qty: 15 G | Refills: 0 | OUTPATIENT
Start: 2024-12-02 | End: 2024-12-16

## 2024-12-02 NOTE — TELEPHONE ENCOUNTER
----- Message from Isidro sent at 12/2/2024  9:15 AM CST -----  Contact: pt  Type:  RX Refill Request    Who Called: pt   Refill or New Rx:refill  RX Name and Strength:clobetasol 0.05% (TEMOVATE) 0.05 % Oint  Preferred Pharmacy with phone number:[facility]/pharmacy #[id number] - [address] AT CORNER OF [address] [address]  Phone: [phone] Fax: [phone]  Local or Mail Order:local  Ordering Provider:Dooley  Would the patient rather a call back or a response via [facility]? call  Best Call Back Number:[phone]  Additional Information:

## 2024-12-11 DIAGNOSIS — L30.9 ECZEMA, UNSPECIFIED TYPE: ICD-10-CM

## 2024-12-11 RX ORDER — CLOBETASOL PROPIONATE 0.5 MG/G
OINTMENT TOPICAL 2 TIMES DAILY
Qty: 60 G | Refills: 5 | Status: SHIPPED | OUTPATIENT
Start: 2024-12-11

## 2024-12-11 NOTE — TELEPHONE ENCOUNTER
----- Message from Sagar sent at 12/10/2024  3:58 PM CST -----  Type:  RX Refill Request    Who Called: pt   Refill or New Rx:new rx  RX Name and Strength:clobetasol 0.05% (TEMOVATE) 0.05 % Oint  Preferred Pharmacy with phone number:[facility]/pharmacy #[id number] - [address] AT [facility]  Local or Mail Order:local   Ordering Provider:león   Would the patient rather a call back or a response via [facility]? Call   Best Call Back Number: [phone]  Additional Information:

## 2024-12-11 NOTE — TELEPHONE ENCOUNTER
----- Message from Tosha sent at 12/11/2024  9:19 AM CST -----  Type:  RX Refill Request     Who Called: pt   Refill or New Rx:refill  RX Name and Strength:clobetasol 0.05% (TEMOVATE) 0.05 % Oint  Preferred Pharmacy with phone number:[facility]/pharmacy #[id number] - [address] AT CORNER OF [address] [address]  Phone: [phone] Fax: [phone]  Local or Mail Order: Local  Ordering Provider:Dooley  Would the patient rather a call back or a response via [facility]? call  Best Call Back Number: [phone]  Additional Information: pt has sent multiple requests. Pharmacy just needs approval from provider. Pt is experiencing severely itchy eczema flare ups for past few days

## 2024-12-17 ENCOUNTER — OFFICE VISIT (OUTPATIENT)
Dept: FAMILY MEDICINE | Facility: HOSPITAL | Age: 79
End: 2024-12-17
Payer: MEDICARE

## 2024-12-17 VITALS
HEART RATE: 88 BPM | SYSTOLIC BLOOD PRESSURE: 119 MMHG | WEIGHT: 177.5 LBS | DIASTOLIC BLOOD PRESSURE: 69 MMHG | BODY MASS INDEX: 30.3 KG/M2 | HEIGHT: 64 IN

## 2024-12-17 DIAGNOSIS — E11.9 TYPE 2 DIABETES MELLITUS WITHOUT COMPLICATION, WITHOUT LONG-TERM CURRENT USE OF INSULIN: Primary | ICD-10-CM

## 2024-12-17 DIAGNOSIS — F41.9 ANXIETY: ICD-10-CM

## 2024-12-17 PROCEDURE — 99214 OFFICE O/P EST MOD 30 MIN: CPT

## 2024-12-17 NOTE — PROGRESS NOTES
[facility]    Subjective:     Aretha Holt is a 79 y.o. year old female with PMHx of T2DM, anxiety and mild MR who presents to clinic for follow up.    Went to ER in October for chest pain and stress at home. She states this has been getting better and improved. States it is difficult for her to talk to other people about her stress but agreeable to therapy. Current stressors is trying to sell her home as she states it is too big for her. Her daughter also lives with her. She attends a [employer] group and feels support thorugh that.    DM: Last A1c 6.0% (6/3/24); patient does not complain of numbness/tingling in bilateral feet.  Patient denies polyuria, polydipsia, polyphagia. She stopped taking her Metformin to see if she can get off of it.    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    Arthritis of both knees 02/27/2023    Gastroesophageal reflux disease 02/27/2023    Postmenopausal 02/04/2022    Mitral valve insufficiency 02/04/2022    Weakness 06/13/2021    Anxiety 06/13/2021    Type 2 diabetes mellitus without complication, without long-term current use of insulin 06/13/2021    Left shoulder pain 06/12/2021    Bilateral edema of lower extremity 05/21/2017        Review of patient's allergies indicates:   Allergen Reactions    Decadron [dexamethasone] Other (See Comments)     Syncope (causes spike in blood glucose with previous bout of syncope)    Celestone [betamethasone sodium phosphate]     Celestone [betamethasone] Other (See Comments)     Syncope (causes spike in blood glucose with previous bout of syncope)    Decadron-la         Past Medical History:   Diagnosis Date  "   Arthritis of both knees 2023    Chest pain of uncertain etiology 2023    Cystitis 2021    Delayed immunizations 2023    Diabetes mellitus     Gastroesophageal reflux disease 2023    Leg cramps 2023    Near syncope 2024    Pain of foot 2022    RUQ pain 2023    Type 2 diabetes mellitus without complication 2021      Past Surgical History:   Procedure Laterality Date     SECTION      CHOLECYSTECTOMY      CHOLECYSTECTOMY        Family History   Problem Relation Name Age of Onset    Stroke Mother      No Known Problems Father      No Known Problems Sister x1     Diabetes Brother x2     No Known Problems Daughter x3     No Known Problems Son x2       Social History     Tobacco Use    Smoking status: Former     Current packs/day: 0.00     Types: Cigarettes     Quit date: 6/10/2014     Years since quitting: 10.5    Smokeless tobacco: Never   Substance Use Topics    Alcohol use: No        Objective:     Vitals:    24 1613   BP: 119/69   Patient Position: Sitting   Pulse: 88   Weight: 80.5 kg (177 lb 7.5 oz)   Height: 5' 4" (1.626 m)     Body mass index is 30.46 kg/m².    Gen: No apparent distress, well nourished and developed, appears stated age  CV: RRR, S1 and S2 present, no LE edema  Resp: CTAB, normal respiratory effort    Assessment/Plan:     Aretha Holt is a 79 y.o. year old female who presents to clinic for follow up.    1. Type 2 diabetes mellitus without complication, without long-term current use of insulin (Primary)  - Well controlled based on last visit of A1c <7.5  - She has since discontinued Metformin which I believe is appropriate given her age  - Repeat labs as below  - Hemoglobin A1C; Future  - TSH; Future  - Comprehensive Metabolic Panel; Future  - CBC Auto Differential; Future  - Lipid Panel; Future  - Microalbumin/creatinine urine ratio; Future    2. Anxiety  - Improving. Agreeable to therapy which I think is the main " treatment that will benefit her  - Ambulatory referral/consult to Psychology; Future    Follow-up: 6 months    Case discussed with staff: ALFRED Dooley MD  [facility], PGY-3  12/17/2024

## 2025-03-24 DIAGNOSIS — Z00.00 ENCOUNTER FOR MEDICARE ANNUAL WELLNESS EXAM: ICD-10-CM
